# Patient Record
Sex: FEMALE | Race: WHITE | Employment: OTHER | URBAN - METROPOLITAN AREA
[De-identification: names, ages, dates, MRNs, and addresses within clinical notes are randomized per-mention and may not be internally consistent; named-entity substitution may affect disease eponyms.]

---

## 2017-03-16 RX ORDER — ENALAPRIL MALEATE 10 MG/1
TABLET ORAL
Qty: 90 TABLET | Refills: 0 | Status: SHIPPED | OUTPATIENT
Start: 2017-03-16 | End: 2017-06-13

## 2017-03-16 RX ORDER — METOPROLOL SUCCINATE 50 MG/1
TABLET, EXTENDED RELEASE ORAL
Qty: 90 TABLET | Refills: 0 | Status: SHIPPED | OUTPATIENT
Start: 2017-03-16 | End: 2017-06-13

## 2017-04-18 ENCOUNTER — OFFICE VISIT (OUTPATIENT)
Dept: INTERNAL MEDICINE CLINIC | Facility: CLINIC | Age: 67
End: 2017-04-18

## 2017-04-18 VITALS
DIASTOLIC BLOOD PRESSURE: 66 MMHG | HEART RATE: 63 BPM | TEMPERATURE: 98 F | BODY MASS INDEX: 28.32 KG/M2 | SYSTOLIC BLOOD PRESSURE: 134 MMHG | RESPIRATION RATE: 18 BRPM | WEIGHT: 170 LBS | HEIGHT: 65 IN

## 2017-04-18 DIAGNOSIS — I10 ESSENTIAL HYPERTENSION WITH GOAL BLOOD PRESSURE LESS THAN 130/80: ICD-10-CM

## 2017-04-18 DIAGNOSIS — I48.0 PAROXYSMAL ATRIAL FIBRILLATION (HCC): ICD-10-CM

## 2017-04-18 DIAGNOSIS — Z12.31 VISIT FOR SCREENING MAMMOGRAM: ICD-10-CM

## 2017-04-18 DIAGNOSIS — J02.9 PHARYNGITIS, UNSPECIFIED ETIOLOGY: Primary | ICD-10-CM

## 2017-04-18 DIAGNOSIS — Z91.89 AT HIGH RISK FOR BREAST CANCER: ICD-10-CM

## 2017-04-18 PROCEDURE — 99214 OFFICE O/P EST MOD 30 MIN: CPT | Performed by: INTERNAL MEDICINE

## 2017-04-18 PROCEDURE — G0463 HOSPITAL OUTPT CLINIC VISIT: HCPCS | Performed by: INTERNAL MEDICINE

## 2017-04-18 RX ORDER — AMOXICILLIN 875 MG/1
875 TABLET, COATED ORAL 2 TIMES DAILY
Qty: 20 TABLET | Refills: 0 | Status: SHIPPED | OUTPATIENT
Start: 2017-04-18 | End: 2018-03-13

## 2017-04-27 RX ORDER — ATORVASTATIN CALCIUM 10 MG/1
TABLET, FILM COATED ORAL
Qty: 30 TABLET | Refills: 0 | Status: SHIPPED | OUTPATIENT
Start: 2017-04-27 | End: 2017-05-28

## 2017-04-30 NOTE — PROGRESS NOTES
HPI:    Patient ID: Particia Pitcher is a 77year old female. Sore Throat   Associated symptoms include congestion. Pertinent negatives include no abdominal pain, coughing, diarrhea, ear discharge, headaches, shortness of breath or vomiting.      Patien Tab TAKE ONE TABLET BY MOUTH ONCE DAILY Disp: 90 tablet Rfl: 0   METOPROLOL SUCCINATE ER 50 MG Oral Tablet 24 Hr TAKE ONE TABLET BY MOUTH ONCE DAILY Disp: 90 tablet Rfl: 0   Levocetirizine Dihydrochloride (XYZAL) 5 MG Oral Tab Take 1 tablet (5 mg total) by Constitutional: She appears well-nourished. No distress. HENT:   Head: Normocephalic and atraumatic. Right Ear: External ear normal.   Left Ear: External ear normal.   Nose: Rhinorrhea present. No mucosal edema.  Right sinus exhibits no maxillary sinu stable. The visualized skin and subcutaneous tissues are unremarkable.     Left breast: The distribution of the parenchyma remains stable. There are  no new masses or suspicious clustered calcifications. The axillary regions  are stable.  The visualized ski

## 2017-05-28 RX ORDER — ATORVASTATIN CALCIUM 10 MG/1
TABLET, FILM COATED ORAL
Qty: 30 TABLET | Refills: 11 | Status: SHIPPED | OUTPATIENT
Start: 2017-05-28 | End: 2018-05-13

## 2017-06-13 RX ORDER — METOPROLOL SUCCINATE 50 MG/1
TABLET, EXTENDED RELEASE ORAL
Qty: 90 TABLET | Refills: 0 | Status: SHIPPED | OUTPATIENT
Start: 2017-06-13 | End: 2017-09-12

## 2017-06-13 RX ORDER — ENALAPRIL MALEATE 10 MG/1
TABLET ORAL
Qty: 90 TABLET | Refills: 0 | Status: SHIPPED | OUTPATIENT
Start: 2017-06-13 | End: 2017-07-31

## 2017-07-06 ENCOUNTER — HOSPITAL ENCOUNTER (OUTPATIENT)
Dept: MAMMOGRAPHY | Age: 67
Discharge: HOME | End: 2017-07-06
Attending: INTERNAL MEDICINE
Payer: MEDICARE

## 2017-07-06 DIAGNOSIS — Z12.31 VISIT FOR SCREENING MAMMOGRAM: ICD-10-CM

## 2017-07-06 PROCEDURE — 77067 SCR MAMMO BI INCL CAD: CPT | Performed by: INTERNAL MEDICINE

## 2017-07-18 ENCOUNTER — HOSPITAL ENCOUNTER (OUTPATIENT)
Dept: MAMMOGRAPHY | Facility: HOSPITAL | Age: 67
Discharge: HOME | End: 2017-07-18
Attending: INTERNAL MEDICINE
Payer: MEDICARE

## 2017-07-18 ENCOUNTER — HOSPITAL ENCOUNTER (OUTPATIENT)
Dept: ULTRASOUND IMAGING | Facility: HOSPITAL | Age: 67
Discharge: HOME | End: 2017-07-18
Attending: INTERNAL MEDICINE
Payer: MEDICARE

## 2017-07-18 DIAGNOSIS — R92.8 ABNORMAL MAMMOGRAM: ICD-10-CM

## 2017-07-18 PROCEDURE — 76642 ULTRASOUND BREAST LIMITED: CPT | Performed by: INTERNAL MEDICINE

## 2017-07-18 PROCEDURE — 77061 BREAST TOMOSYNTHESIS UNI: CPT | Performed by: INTERNAL MEDICINE

## 2017-07-18 PROCEDURE — 77065 DX MAMMO INCL CAD UNI: CPT | Performed by: INTERNAL MEDICINE

## 2017-07-31 PROBLEM — I27.20 PULMONARY HTN (HCC): Status: ACTIVE | Noted: 2017-07-31

## 2017-08-07 ENCOUNTER — LAB ENCOUNTER (OUTPATIENT)
Dept: LAB | Age: 67
End: 2017-08-07
Attending: INTERNAL MEDICINE
Payer: MEDICARE

## 2017-08-07 DIAGNOSIS — I08.0 MITRAL VALVE INSUFFICIENCY AND AORTIC VALVE INSUFFICIENCY: ICD-10-CM

## 2017-08-07 DIAGNOSIS — I48.0 PAROXYSMAL ATRIAL FIBRILLATION (HCC): ICD-10-CM

## 2017-08-07 DIAGNOSIS — I27.20 PULMONARY HTN (HCC): ICD-10-CM

## 2017-08-07 DIAGNOSIS — I10 HYPERTENSION, BENIGN: ICD-10-CM

## 2017-08-07 LAB
ANION GAP SERPL CALC-SCNC: 6 MMOL/L (ref 0–18)
BUN SERPL-MCNC: 16 MG/DL (ref 8–20)
BUN/CREAT SERPL: 17.8 (ref 10–20)
CALCIUM SERPL-MCNC: 9.3 MG/DL (ref 8.5–10.5)
CHLORIDE SERPL-SCNC: 110 MMOL/L (ref 95–110)
CO2 SERPL-SCNC: 25 MMOL/L (ref 22–32)
CREAT SERPL-MCNC: 0.9 MG/DL (ref 0.5–1.5)
GLUCOSE SERPL-MCNC: 91 MG/DL (ref 70–99)
OSMOLALITY UR CALC.SUM OF ELEC: 293 MOSM/KG (ref 275–295)
POTASSIUM SERPL-SCNC: 4 MMOL/L (ref 3.3–5.1)
SODIUM SERPL-SCNC: 141 MMOL/L (ref 136–144)

## 2017-08-07 PROCEDURE — 36415 COLL VENOUS BLD VENIPUNCTURE: CPT

## 2017-08-07 PROCEDURE — 80048 BASIC METABOLIC PNL TOTAL CA: CPT

## 2017-09-12 RX ORDER — METOPROLOL SUCCINATE 50 MG/1
TABLET, EXTENDED RELEASE ORAL
Qty: 90 TABLET | Refills: 3 | Status: SHIPPED | OUTPATIENT
Start: 2017-09-12 | End: 2018-07-31

## 2018-03-05 ENCOUNTER — TELEPHONE (OUTPATIENT)
Dept: INTERNAL MEDICINE CLINIC | Facility: CLINIC | Age: 68
End: 2018-03-05

## 2018-03-05 NOTE — TELEPHONE ENCOUNTER
•  ATORVASTATIN 10 MG Oral Tab, TAKE ONE TABLET BY MOUTH ONCE DAILY IN THE EVENING . APPOINTMENT  NEEDED  FOR  ADDITIONAL  REFILLS, Disp: 30 tablet, Rfl: 11

## 2018-03-13 ENCOUNTER — OFFICE VISIT (OUTPATIENT)
Dept: INTERNAL MEDICINE CLINIC | Facility: CLINIC | Age: 68
End: 2018-03-13

## 2018-03-13 ENCOUNTER — APPOINTMENT (OUTPATIENT)
Dept: LAB | Age: 68
End: 2018-03-13
Attending: INTERNAL MEDICINE
Payer: MEDICARE

## 2018-03-13 VITALS
SYSTOLIC BLOOD PRESSURE: 135 MMHG | HEART RATE: 64 BPM | DIASTOLIC BLOOD PRESSURE: 60 MMHG | TEMPERATURE: 98 F | BODY MASS INDEX: 27.82 KG/M2 | WEIGHT: 167 LBS | HEIGHT: 65 IN

## 2018-03-13 DIAGNOSIS — I27.20 PULMONARY HTN (HCC): ICD-10-CM

## 2018-03-13 DIAGNOSIS — Z12.31 VISIT FOR SCREENING MAMMOGRAM: ICD-10-CM

## 2018-03-13 DIAGNOSIS — I10 ESSENTIAL HYPERTENSION WITH GOAL BLOOD PRESSURE LESS THAN 130/80: ICD-10-CM

## 2018-03-13 DIAGNOSIS — E78.5 HYPERLIPIDEMIA, UNSPECIFIED HYPERLIPIDEMIA TYPE: ICD-10-CM

## 2018-03-13 DIAGNOSIS — Z78.0 POSTMENOPAUSAL: ICD-10-CM

## 2018-03-13 DIAGNOSIS — E55.9 VITAMIN D DEFICIENCY: ICD-10-CM

## 2018-03-13 DIAGNOSIS — Z00.00 ENCOUNTER FOR ANNUAL HEALTH EXAMINATION: ICD-10-CM

## 2018-03-13 DIAGNOSIS — Z91.89 AT HIGH RISK FOR BREAST CANCER: ICD-10-CM

## 2018-03-13 DIAGNOSIS — Z00.00 MEDICARE ANNUAL WELLNESS VISIT, SUBSEQUENT: Primary | ICD-10-CM

## 2018-03-13 DIAGNOSIS — I48.0 PAROXYSMAL ATRIAL FIBRILLATION (HCC): ICD-10-CM

## 2018-03-13 LAB
ALBUMIN SERPL BCP-MCNC: 3.9 G/DL (ref 3.5–4.8)
ALBUMIN/GLOB SERPL: 1.1 {RATIO} (ref 1–2)
ALP SERPL-CCNC: 62 U/L (ref 32–100)
ALT SERPL-CCNC: 21 U/L (ref 14–54)
ANION GAP SERPL CALC-SCNC: 7 MMOL/L (ref 0–18)
AST SERPL-CCNC: 24 U/L (ref 15–41)
BILIRUB SERPL-MCNC: 0.9 MG/DL (ref 0.3–1.2)
BUN SERPL-MCNC: 16 MG/DL (ref 8–20)
BUN/CREAT SERPL: 19.3 (ref 10–20)
CALCIUM SERPL-MCNC: 9.2 MG/DL (ref 8.5–10.5)
CHLORIDE SERPL-SCNC: 106 MMOL/L (ref 95–110)
CHOLEST SERPL-MCNC: 144 MG/DL (ref 110–200)
CO2 SERPL-SCNC: 26 MMOL/L (ref 22–32)
CREAT SERPL-MCNC: 0.83 MG/DL (ref 0.5–1.5)
ERYTHROCYTE [DISTWIDTH] IN BLOOD BY AUTOMATED COUNT: 13.3 % (ref 11–15)
GLOBULIN PLAS-MCNC: 3.4 G/DL (ref 2.5–3.7)
GLUCOSE SERPL-MCNC: 96 MG/DL (ref 70–99)
HCT VFR BLD AUTO: 38.8 % (ref 35–48)
HDLC SERPL-MCNC: 47 MG/DL
HGB BLD-MCNC: 13.1 G/DL (ref 12–16)
LDLC SERPL CALC-MCNC: 83 MG/DL (ref 0–99)
MCH RBC QN AUTO: 29.3 PG (ref 27–32)
MCHC RBC AUTO-ENTMCNC: 33.7 G/DL (ref 32–37)
MCV RBC AUTO: 86.9 FL (ref 80–100)
NONHDLC SERPL-MCNC: 97 MG/DL
OSMOLALITY UR CALC.SUM OF ELEC: 289 MOSM/KG (ref 275–295)
PATIENT FASTING: YES
PLATELET # BLD AUTO: 170 K/UL (ref 140–400)
PMV BLD AUTO: 9.7 FL (ref 7.4–10.3)
POTASSIUM SERPL-SCNC: 3.5 MMOL/L (ref 3.3–5.1)
PROT SERPL-MCNC: 7.3 G/DL (ref 5.9–8.4)
RBC # BLD AUTO: 4.47 M/UL (ref 3.7–5.4)
RBC #/AREA URNS AUTO: 1 /HPF
SODIUM SERPL-SCNC: 139 MMOL/L (ref 136–144)
T4 FREE SERPL-MCNC: 0.93 NG/DL (ref 0.58–1.64)
TRIGL SERPL-MCNC: 72 MG/DL (ref 1–149)
TSH SERPL-ACNC: 2.26 UIU/ML (ref 0.45–5.33)
WBC # BLD AUTO: 4.9 K/UL (ref 4–11)
WBC #/AREA URNS AUTO: <1 /HPF

## 2018-03-13 PROCEDURE — 84439 ASSAY OF FREE THYROXINE: CPT

## 2018-03-13 PROCEDURE — 36415 COLL VENOUS BLD VENIPUNCTURE: CPT

## 2018-03-13 PROCEDURE — 85027 COMPLETE CBC AUTOMATED: CPT

## 2018-03-13 PROCEDURE — 80061 LIPID PANEL: CPT

## 2018-03-13 PROCEDURE — G0439 PPPS, SUBSEQ VISIT: HCPCS | Performed by: INTERNAL MEDICINE

## 2018-03-13 PROCEDURE — 80053 COMPREHEN METABOLIC PANEL: CPT

## 2018-03-13 PROCEDURE — 84443 ASSAY THYROID STIM HORMONE: CPT

## 2018-03-13 PROCEDURE — 81015 MICROSCOPIC EXAM OF URINE: CPT

## 2018-03-13 NOTE — PATIENT INSTRUCTIONS
Elina Campos's SCREENING SCHEDULE   Tests on this list are recommended by your physician but may not be covered, or covered at this frequency, by your insurer. Please check with your insurance carrier before scheduling to verify coverage.    Lynell Fabry Age 76     Colonoscopy Screen   Covered every 10 years- more often if abnormal Colonoscopy,10 Years due on 02/28/2024 Update Health Maintenance if applicable    Flex Sigmoidoscopy Screen  Covered every 5 years No results found for this or any previous visi performed in visit on 04/22/16  -PNEUMOCOCCAL VACC, 13 AAKASH IM    Please get once after your 65th birthday    Pneumococcal 23 (Pneumovax)  Covered Once after 65 No orders found for this or any previous visit.  Please get once after your 65th birthday    Michela Sutton

## 2018-03-13 NOTE — PROGRESS NOTES
HPI:   Miguel Mccracken is a 79year old female who presents for a Medicare Subsequent Annual Wellness visit (Pt already had Initial Annual Wellness).       Her last annual assessment has been over 1 year: Annual Physical due on 04/22/2017         Fall/Ris fibrillation ([facility] [address]     Hypertension, benign     Hyperlipidemia     Urticaria     Pulmonary HTN ([facility] [address]    Wt Readings from Last 3 Encounters:  03/13/18 : 167 lb (75.8 kg)  07/31/17 : 168 lb (76.2 kg)  04/18/17 : 170 lb (77.1 kg)     Last Cholesterol Labs: She  has a past surgical history that includes electrocardiogram, complete (05-); smay needle localization w/ specimen 1 site left (1998); samy needle localization w/ specimen 1 site right (1998); and hysterectomy.     Her family history includes Maura hearing over the telephone:  No    I have trouble understanding things on the TV:  No I have to strain to understand conversations:  No   I have to worry about missing the telephone ring or doorbell:  No I have trouble hearing conversations in a noisy back hernia. Musculoskeletal: She exhibits no tenderness. Neurological: She is alert. Gait normal.   Skin: Skin is warm and dry. No lesion and no rash noted. She is not diaphoretic.    Psychiatric: Her behavior is normal. Thought content normal.        Vacci Low chol diet      (Z12.31) Visit for screening mammogram  Plan: [facility] SHARONA 2D+3D SCREENING BILAT         (CPT=77067/90219)    Ordered  Self breat exam advsied    (Z78.0) Postmenopausal  Plan: XR DEXA BONE DENSITOMETRY (CPT=77080)       No fractures      (E5 flowsheet data found. Fecal Occult Blood Annually No results found for: FOB No flowsheet data found. Glaucoma Screening      Ophthalmology Visit Annually: Diabetics, FHx Glaucoma, AA>50, > 65 No flowsheet data found.     Bone Density Screenin Procedure External Lab or Procedure      Annual Monitoring of Persistent     Medications (ACE/ARB, digoxin diuretics, anticonvulsants.)    Potassium  Annually Potassium (mmol/L)   Date Value   08/07/2017 4.0     POTASSIUM (P) (mmol/L)   Date Value   04/26/

## 2018-05-15 RX ORDER — ATORVASTATIN CALCIUM 10 MG/1
TABLET, FILM COATED ORAL
Qty: 30 TABLET | Refills: 2 | Status: SHIPPED | OUTPATIENT
Start: 2018-05-15 | End: 2018-08-05

## 2018-05-15 NOTE — TELEPHONE ENCOUNTER
Cholesterol Medications  Protocol Criteria:  · Appointment scheduled in the past 12 months or in the next 3 months  · ALT & LDL on file in the past 12 months  · ALT result < 80  · LDL result <130   Recent Outpatient Visits            2 months ago Medicare

## 2018-07-31 PROBLEM — R01.1 MURMUR: Status: ACTIVE | Noted: 2018-07-31

## 2018-08-06 RX ORDER — ATORVASTATIN CALCIUM 10 MG/1
TABLET, FILM COATED ORAL
Qty: 30 TABLET | Refills: 11 | Status: SHIPPED | OUTPATIENT
Start: 2018-08-06 | End: 2019-07-11

## 2018-08-16 ENCOUNTER — HOSPITAL ENCOUNTER (OUTPATIENT)
Dept: BONE DENSITY | Facility: HOSPITAL | Age: 68
Discharge: HOME | End: 2018-08-16
Attending: INTERNAL MEDICINE
Payer: MEDICARE

## 2018-08-16 ENCOUNTER — HOSPITAL ENCOUNTER (OUTPATIENT)
Dept: MAMMOGRAPHY | Facility: HOSPITAL | Age: 68
Discharge: HOME | End: 2018-08-16
Attending: INTERNAL MEDICINE
Payer: MEDICARE

## 2018-08-16 DIAGNOSIS — Z78.0 POSTMENOPAUSAL: ICD-10-CM

## 2018-08-16 DIAGNOSIS — Z12.31 VISIT FOR SCREENING MAMMOGRAM: ICD-10-CM

## 2018-08-16 PROCEDURE — 77067 SCR MAMMO BI INCL CAD: CPT | Performed by: INTERNAL MEDICINE

## 2018-08-16 PROCEDURE — 77080 DXA BONE DENSITY AXIAL: CPT | Performed by: INTERNAL MEDICINE

## 2018-08-16 PROCEDURE — 77063 BREAST TOMOSYNTHESIS BI: CPT | Performed by: INTERNAL MEDICINE

## 2018-08-25 ENCOUNTER — TELEPHONE (OUTPATIENT)
Dept: OTHER | Age: 68
End: 2018-08-25

## 2018-08-25 NOTE — TELEPHONE ENCOUNTER
Result Notes     Notes recorded by Augusto López RN on 8/24/2018 at 2:21 PM CDT  [place] transfer to triage  ------    Notes recorded by Chandan Miranda on 8/24/2018 at 7:45 AM CDT  Routed to triage.   ------    Notes recorded by Girish Wilder MD on 8

## 2018-09-06 RX ORDER — METOPROLOL SUCCINATE 50 MG/1
TABLET, EXTENDED RELEASE ORAL
Qty: 90 TABLET | Refills: 0 | Status: SHIPPED | OUTPATIENT
Start: 2018-09-06 | End: 2018-12-02

## 2018-09-06 NOTE — TELEPHONE ENCOUNTER
Hypertensive Medications  Protocol Criteria:  · Appointment scheduled in the past 6 months or in the next 3 months  · BMP or CMP in the past 12 months  · Creatinine result < 2  Recent Outpatient Visits            1 month ago Paroxysmal atrial fibrillation

## 2018-12-03 RX ORDER — METOPROLOL SUCCINATE 50 MG/1
TABLET, EXTENDED RELEASE ORAL
Qty: 90 TABLET | Refills: 3 | Status: SHIPPED | OUTPATIENT
Start: 2018-12-03 | End: 2019-11-19

## 2019-05-22 ENCOUNTER — TELEPHONE (OUTPATIENT)
Dept: INTERNAL MEDICINE CLINIC | Facility: CLINIC | Age: 69
End: 2019-05-22

## 2019-05-22 ENCOUNTER — APPOINTMENT (OUTPATIENT)
Dept: LAB | Age: 69
End: 2019-05-22
Attending: INTERNAL MEDICINE
Payer: MEDICARE

## 2019-05-22 ENCOUNTER — OFFICE VISIT (OUTPATIENT)
Dept: INTERNAL MEDICINE CLINIC | Facility: CLINIC | Age: 69
End: 2019-05-22
Payer: MEDICARE

## 2019-05-22 ENCOUNTER — HOSPITAL ENCOUNTER (OUTPATIENT)
Dept: GENERAL RADIOLOGY | Age: 69
Discharge: HOME | End: 2019-05-22
Attending: INTERNAL MEDICINE | Admitting: INTERNAL MEDICINE
Payer: MEDICARE

## 2019-05-22 VITALS
HEART RATE: 58 BPM | TEMPERATURE: 98 F | DIASTOLIC BLOOD PRESSURE: 76 MMHG | WEIGHT: 160 LBS | BODY MASS INDEX: 26.66 KG/M2 | HEIGHT: 65 IN | SYSTOLIC BLOOD PRESSURE: 162 MMHG

## 2019-05-22 DIAGNOSIS — E78.5 HYPERLIPIDEMIA, UNSPECIFIED HYPERLIPIDEMIA TYPE: ICD-10-CM

## 2019-05-22 DIAGNOSIS — Z12.31 VISIT FOR SCREENING MAMMOGRAM: ICD-10-CM

## 2019-05-22 DIAGNOSIS — I10 HYPERTENSION, BENIGN: ICD-10-CM

## 2019-05-22 DIAGNOSIS — Z00.00 ENCOUNTER FOR MEDICARE ANNUAL WELLNESS EXAM: Primary | ICD-10-CM

## 2019-05-22 DIAGNOSIS — I48.0 PAROXYSMAL ATRIAL FIBRILLATION (HCC): ICD-10-CM

## 2019-05-22 DIAGNOSIS — I27.20 PULMONARY HTN (HCC): ICD-10-CM

## 2019-05-22 PROCEDURE — 84439 ASSAY OF FREE THYROXINE: CPT

## 2019-05-22 PROCEDURE — G0009 ADMIN PNEUMOCOCCAL VACCINE: HCPCS | Performed by: INTERNAL MEDICINE

## 2019-05-22 PROCEDURE — 85027 COMPLETE CBC AUTOMATED: CPT

## 2019-05-22 PROCEDURE — 80053 COMPREHEN METABOLIC PANEL: CPT

## 2019-05-22 PROCEDURE — 84443 ASSAY THYROID STIM HORMONE: CPT

## 2019-05-22 PROCEDURE — 81015 MICROSCOPIC EXAM OF URINE: CPT

## 2019-05-22 PROCEDURE — 71046 X-RAY EXAM CHEST 2 VIEWS: CPT | Performed by: INTERNAL MEDICINE

## 2019-05-22 PROCEDURE — 80061 LIPID PANEL: CPT

## 2019-05-22 PROCEDURE — 36415 COLL VENOUS BLD VENIPUNCTURE: CPT

## 2019-05-22 PROCEDURE — G0463 HOSPITAL OUTPT CLINIC VISIT: HCPCS | Performed by: INTERNAL MEDICINE

## 2019-05-22 PROCEDURE — 99213 OFFICE O/P EST LOW 20 MIN: CPT | Performed by: INTERNAL MEDICINE

## 2019-05-22 PROCEDURE — G0439 PPPS, SUBSEQ VISIT: HCPCS | Performed by: INTERNAL MEDICINE

## 2019-05-22 PROCEDURE — 90732 PPSV23 VACC 2 YRS+ SUBQ/IM: CPT | Performed by: INTERNAL MEDICINE

## 2019-05-22 NOTE — TELEPHONE ENCOUNTER
Call received from [facility]alie [facility] to verify MMP's order for CXR for pt. Pt states she was seen today by PCP and was not told CXR was ordered. Informed Khris Brady was ordered for HTN.  Pt came for annual Medicare Wellness check up and the CXR is part of the

## 2019-05-22 NOTE — PROGRESS NOTES
HPI:   Whit Alonso is a 76year old female who presents for a Medicare Subsequent Annual Wellness visit (Pt already had Initial Annual Wellness).       Her last annual assessment has been over 1 year: Annual Physical due on 03/13/2019       Here for m performed Face to Face with patient and Family/surrogate (if present), and forms available to patient in AVS       She does NOT have a Power of  for [employer] on file in [place].    Advance care planning including the explanation and discussion of ad (20 mg total) by mouth daily with food.    ENALAPRIL MALEATE 10 MG Oral Tab TAKE ONE TABLET BY MOUTH TWICE DAILY   METOPROLOL SUCCINATE ER 50 MG Oral Tablet 24 Hr TAKE 1 TABLET BY MOUTH ONCE DAILY   ATORVASTATIN 10 MG Oral Tab TAKE 1 TABLET BY MOUTH ONCE DA shortness of breath and wheezing. Cardiovascular: Negative for chest pain, palpitations and leg swelling. Gastrointestinal: Negative for nausea, vomiting, abdominal pain, constipation and abdominal distention.    Genitourinary: Negative for dysuria, fr misunderstand what others are saying and make inappropriate responses:  No I avoid social activities because I cannot hear well and fear I will reply improperly:  No   Family members and friends have told me they think I may have hearing loss:   No SHARONA 2D+3D SCREENING BILAT         (CPT=77067/86568)        . Breast exam.  Bilateral fibrocystic  No discrete palpable masses or tenderness  No nipple discharge  And no axillary adenopathy.   Self breast exam advised      Medications and most recent test re Colonoscopy due on 02/28/2024 Update [facility] if applicable    Flex Sigmoidoscopy Screen every 10 years No results found for this or any previous visit. No flowsheet data found.      Fecal Occult Blood Annually No results found for: FOB No flowshe Part B No vaccine history found This may be covered with your pharmacy  prescription benefits      SPECIFIC DISEASE MONITORING Internal Lab or Procedure External Lab or Procedure      Annual Monitoring of Persistent     Medications (ACE/ARB, digoxin diuret

## 2019-05-24 NOTE — PROGRESS NOTES
HPI:    Patient ID: Tacho Cullen is a 76year old female.     HPI    BP (!) 162/76 (BP Location: Left arm, Patient Position: Sitting, Cuff Size: adult)   Pulse 58   Temp 97.9 °F (36.6 °C) (Oral)   Ht 5' 5\" (1.651 m)   Wt 160 lb (72.6 kg)   BMI 26.63 k Murmur 7/31/2018   • Unspecified essential hypertension    • Vertigo       Past Surgical History:   Procedure Laterality Date   • ELECTROCARDIOGRAM, COMPLETE  05-    SCANNED TO MEDIA TAB: 05-   • HYSTERECTOMY      age 28   • MAM NEEDLE Cordell Fothergill vitals reviewed.            ASSESSMENT/PLAN:   Encounter for medicare annual wellness exam  (primary encounter diagnosis)  Hypertension, benign  Paroxysmal atrial fibrillation (hcc)  Pulmonary htn (hcc)  Hyperlipidemia, unspecified hyperlipidemia type  Visi

## 2019-06-21 ENCOUNTER — HOSPITAL ENCOUNTER (OUTPATIENT)
Dept: GENERAL RADIOLOGY | Age: 69
Discharge: HOME | End: 2019-06-21
Attending: INTERNAL MEDICINE
Payer: MEDICARE

## 2019-06-21 DIAGNOSIS — J90 PLEURAL EFFUSION: ICD-10-CM

## 2019-06-21 PROCEDURE — 71046 X-RAY EXAM CHEST 2 VIEWS: CPT | Performed by: INTERNAL MEDICINE

## 2019-07-11 NOTE — TELEPHONE ENCOUNTER
Current Outpatient Medications:   •  ATORVASTATIN 10 MG Oral Tab, TAKE 1 TABLET BY MOUTH ONCE DAILY IN THE EVENING, Disp: 30 tablet, Rfl: 11

## 2019-07-12 RX ORDER — ATORVASTATIN CALCIUM 10 MG/1
TABLET, FILM COATED ORAL
Qty: 90 TABLET | Refills: 1 | Status: SHIPPED | OUTPATIENT
Start: 2019-07-12 | End: 2020-01-04

## 2019-07-12 NOTE — TELEPHONE ENCOUNTER
Refill passed per [facility] protocol.   Cholesterol Medications  Protocol Criteria:  · Appointment scheduled in the past 12 months or in the next 3 months  · ALT & LDL on file in the past 12 months  · ALT result < 80  · LDL result <130   Recent Outpat

## 2019-08-19 ENCOUNTER — HOSPITAL ENCOUNTER (OUTPATIENT)
Dept: MAMMOGRAPHY | Facility: HOSPITAL | Age: 69
Discharge: HOME | End: 2019-08-19
Attending: INTERNAL MEDICINE
Payer: MEDICARE

## 2019-08-19 DIAGNOSIS — Z12.31 VISIT FOR SCREENING MAMMOGRAM: ICD-10-CM

## 2019-08-19 PROCEDURE — 77067 SCR MAMMO BI INCL CAD: CPT | Performed by: INTERNAL MEDICINE

## 2019-08-19 PROCEDURE — 77063 BREAST TOMOSYNTHESIS BI: CPT | Performed by: INTERNAL MEDICINE

## 2019-09-30 PROBLEM — I05.1 RHEUMATIC MITRAL REGURGITATION: Status: ACTIVE | Noted: 2019-09-30

## 2019-11-19 RX ORDER — METOPROLOL SUCCINATE 50 MG/1
50 TABLET, EXTENDED RELEASE ORAL
Qty: 90 TABLET | Refills: 0 | Status: SHIPPED | OUTPATIENT
Start: 2019-11-19 | End: 2020-02-12

## 2019-11-25 ENCOUNTER — OFFICE VISIT (OUTPATIENT)
Dept: INTERNAL MEDICINE CLINIC | Facility: CLINIC | Age: 69
End: 2019-11-25
Payer: MEDICARE

## 2019-11-25 VITALS
WEIGHT: 159 LBS | DIASTOLIC BLOOD PRESSURE: 77 MMHG | HEART RATE: 61 BPM | BODY MASS INDEX: 26.49 KG/M2 | SYSTOLIC BLOOD PRESSURE: 166 MMHG | HEIGHT: 65 IN

## 2019-11-25 DIAGNOSIS — I10 HYPERTENSION, BENIGN: Primary | ICD-10-CM

## 2019-11-25 DIAGNOSIS — M25.561 ACUTE PAIN OF RIGHT KNEE: ICD-10-CM

## 2019-11-25 PROCEDURE — 99213 OFFICE O/P EST LOW 20 MIN: CPT | Performed by: INTERNAL MEDICINE

## 2019-11-25 PROCEDURE — G0463 HOSPITAL OUTPT CLINIC VISIT: HCPCS | Performed by: INTERNAL MEDICINE

## 2019-11-25 NOTE — PROGRESS NOTES
Catalina Kinney is a 76year old female.   Patient presents with:  Pain: Pt states right knee pain past month, stiffness, soreness, tenderness, aching      HPI:   She comes in urgent visit  C/C right knee pain  C/o right knee pain x1 month  No falls traum wire 1 site left (cpt=19281)  1998   • Dasha localization wire 1 site right (cpt=19281)  1998      Social History:  Social History    Tobacco Use      Smoking status: Former Smoker        Years: 15.00      Smokeless tobacco: Former User        Quit date: 7/2 Karena Ritchie etc.    The patient indicates understanding of these issues and agrees to the plan. No follow-ups on file.

## 2019-11-26 ENCOUNTER — HOSPITAL ENCOUNTER (OUTPATIENT)
Dept: GENERAL RADIOLOGY | Age: 69
Discharge: HOME | End: 2019-11-26
Attending: INTERNAL MEDICINE
Payer: MEDICARE

## 2019-11-26 DIAGNOSIS — M25.561 ACUTE PAIN OF RIGHT KNEE: ICD-10-CM

## 2019-11-26 PROCEDURE — 73564 X-RAY EXAM KNEE 4 OR MORE: CPT | Performed by: INTERNAL MEDICINE

## 2020-01-04 RX ORDER — ATORVASTATIN CALCIUM 10 MG/1
TABLET, FILM COATED ORAL
Qty: 90 TABLET | Refills: 1 | Status: SHIPPED | OUTPATIENT
Start: 2020-01-04 | End: 2020-06-26

## 2020-01-06 ENCOUNTER — OFFICE VISIT (OUTPATIENT)
Dept: PHYSICAL THERAPY | Facility: HOSPITAL | Age: 70
End: 2020-01-06
Attending: INTERNAL MEDICINE
Payer: MEDICARE

## 2020-01-06 DIAGNOSIS — M25.561 ACUTE PAIN OF RIGHT KNEE: ICD-10-CM

## 2020-01-06 PROCEDURE — 97110 THERAPEUTIC EXERCISES: CPT

## 2020-01-06 PROCEDURE — 97162 PT EVAL MOD COMPLEX 30 MIN: CPT

## 2020-01-06 NOTE — PROGRESS NOTES
KNEE EVALUATION:   Referring Physician: Dr. Pattie Boyle  Diagnosis: Acute pain of right knee (M25.561)    Date of Service: 1/6/2020     PATIENT SUMMARY   Michelle Black is a 71year old y/o female who presents to therapy today with complaints of R sided Acute pain of right knee (M25.561)    who presents with impairments in muscle length, motor control, gait, strength.  Rito Coppola demonstrates signs and symptoms consistent with likely patellofemoral pain syndrome due to her hip weakness and poor vince Motor  Control Comments    Left Right    Concentric step-up Decreased Decreased Narrow FABIANO   Eccentric step-down Decreased Decreased Narrow FABIANO     Activity Motor Control Comments    Left Right    Double Leg Squat Decreased Decreased Anterior tibial progre 45 mins to return to daily exercise for the health benefits of exercise. Mel[address] will report 2/10 pain or less with ascending/descending stairs to improve household ADL performance.     Frequency / Duration: Patient will be seen for 2 x/week o

## 2020-01-08 ENCOUNTER — OFFICE VISIT (OUTPATIENT)
Dept: PHYSICAL THERAPY | Facility: HOSPITAL | Age: 70
End: 2020-01-08
Attending: INTERNAL MEDICINE
Payer: MEDICARE

## 2020-01-08 PROCEDURE — 97110 THERAPEUTIC EXERCISES: CPT

## 2020-01-08 PROCEDURE — 97140 MANUAL THERAPY 1/> REGIONS: CPT

## 2020-01-08 NOTE — PROGRESS NOTES
Dx:  Acute pain of right knee (M25.561)           Authorized # of Visits:  2/10         Next MD visit: Dr. Christian Palomino  None scheduled  Fall Risk: Standard      Precautions: N/A           Subjective:  Pt reports compliance with HEP  Objective:   See below    D

## 2020-01-13 ENCOUNTER — OFFICE VISIT (OUTPATIENT)
Dept: PHYSICAL THERAPY | Facility: HOSPITAL | Age: 70
End: 2020-01-13
Attending: INTERNAL MEDICINE
Payer: MEDICARE

## 2020-01-13 PROCEDURE — 97140 MANUAL THERAPY 1/> REGIONS: CPT

## 2020-01-13 PROCEDURE — 97110 THERAPEUTIC EXERCISES: CPT

## 2020-01-13 NOTE — PROGRESS NOTES
Dx:  Acute pain of right knee (M25.561)             Authorized # of Visits:  10 (MEDICARE PART A&B)          Next MD visit: Dr. Teresa Hunter  None scheduled  Fall Risk: Standard        Precautions: N/A           Subjective: \"I feel some pain and ache yesterday and improving glut max/med activation/motor control    Charges: Grace 2 Man 1   Total Timed Treatment: 45 min   Total Treatment Time: 45 min

## 2020-01-15 ENCOUNTER — OFFICE VISIT (OUTPATIENT)
Dept: PHYSICAL THERAPY | Facility: HOSPITAL | Age: 70
End: 2020-01-15
Attending: INTERNAL MEDICINE
Payer: MEDICARE

## 2020-01-15 PROCEDURE — 97110 THERAPEUTIC EXERCISES: CPT

## 2020-01-15 NOTE — PROGRESS NOTES
Dx:  Acute pain of right knee (M25.561)             Authorized # of Visits:  10 (MEDICARE PART A&B)          Next MD visit: Dr. Kayleigh Gupta  None scheduled  Fall Risk: Standard        Precautions: N/A           Subjective: \"After last session I feel no pain r

## 2020-01-20 ENCOUNTER — OFFICE VISIT (OUTPATIENT)
Dept: PHYSICAL THERAPY | Facility: HOSPITAL | Age: 70
End: 2020-01-20
Attending: INTERNAL MEDICINE
Payer: MEDICARE

## 2020-01-20 PROCEDURE — 97110 THERAPEUTIC EXERCISES: CPT

## 2020-01-20 NOTE — PROGRESS NOTES
Dx:  Acute pain of right knee (M25.561)             Authorized # of Visits:  10 (MEDICARE PART A&B)          Next MD visit: Dr. Sehrif Bronson  None scheduled  Fall Risk: Standard        Precautions: N/A           Subjective: \"I feel ok today, on/off I feel sore of exercise. Melum [address] will report 2/10 pain or less with ascending/descending stairs to improve household ADL performance. Plan: Continue with POC with focus on LE strengthening and global hip strengthening to improved functional mobility.

## 2020-01-22 ENCOUNTER — OFFICE VISIT (OUTPATIENT)
Dept: PHYSICAL THERAPY | Facility: HOSPITAL | Age: 70
End: 2020-01-22
Attending: INTERNAL MEDICINE
Payer: MEDICARE

## 2020-01-22 PROCEDURE — 97112 NEUROMUSCULAR REEDUCATION: CPT

## 2020-01-22 PROCEDURE — 97110 THERAPEUTIC EXERCISES: CPT

## 2020-01-22 NOTE — PROGRESS NOTES
Dx:  Acute pain of right knee (M25.561)             Authorized # of Visits:  10 (MEDICARE PART A&B)          Next MD visit: Dr. Mahsa Barnes  None scheduled  Fall Risk: Standard        Precautions: N/A           Subjective: Pt reported she is pleased with overa requires high volume of verbal/tactile cues to improve activation. Pt educated at length on importance of focusing on quality of exercise performance vs speed/quantity of activity performance.  Pt stated understanding of importance of quality vs quantity to

## 2020-01-27 ENCOUNTER — OFFICE VISIT (OUTPATIENT)
Dept: PHYSICAL THERAPY | Facility: HOSPITAL | Age: 70
End: 2020-01-27
Attending: INTERNAL MEDICINE
Payer: MEDICARE

## 2020-01-27 PROCEDURE — 97110 THERAPEUTIC EXERCISES: CPT

## 2020-01-27 NOTE — PROGRESS NOTES
Dx:  Acute pain of right knee (M25.561)             Authorized # of Visits:  10 (MEDICARE PART A&B)          Next MD visit: Dr. Azael Dumas  None scheduled  Fall Risk: Standard        Precautions: N/A           Subjective: \"I am ok, getting better than I star sec  Re-education of gait patterning with focus on shorter step and decreased narrow stance     HEP:  Review and explain if you go to gym than used leg press, hip abd and adductor machine and walk on TM 15 min with 2.5 mph      HEP:   HEP:        Assessmen

## 2020-01-29 ENCOUNTER — OFFICE VISIT (OUTPATIENT)
Dept: PHYSICAL THERAPY | Facility: HOSPITAL | Age: 70
End: 2020-01-29
Attending: INTERNAL MEDICINE
Payer: MEDICARE

## 2020-01-29 PROCEDURE — 97110 THERAPEUTIC EXERCISES: CPT

## 2020-01-29 NOTE — PROGRESS NOTES
Dx:  Acute pain of right knee (M25.561)             Authorized # of Visits:  10 (MEDICARE PART A&B)          Next MD visit: Dr. Azael Dumas  None scheduled  Fall Risk: Standard        Precautions: N/A           Subjective: \"I am ok, getting better than I star to demonstrate return to maximum functional performance. 2. Jorden Jin will report she is able to walk for 45 mins to return to daily exercise for the health benefits of exercise.-Partially MET  3.  Jorden Jin will report 2/10 pain or less w

## 2020-02-03 ENCOUNTER — OFFICE VISIT (OUTPATIENT)
Dept: PHYSICAL THERAPY | Facility: HOSPITAL | Age: 70
End: 2020-02-03
Attending: INTERNAL MEDICINE
Payer: MEDICARE

## 2020-02-03 PROCEDURE — 97110 THERAPEUTIC EXERCISES: CPT

## 2020-02-03 PROCEDURE — 97112 NEUROMUSCULAR REEDUCATION: CPT

## 2020-02-03 NOTE — PROGRESS NOTES
Dx:  Acute pain of right knee (M25.561)             Authorized # of Visits:  10 (MEDICARE PART A&B)          Next MD visit: Dr. Mahesh Milner  None scheduled  Fall Risk: Standard        Precautions: N/A           Subjective:   Pt reports her knee pain has been g LAQ with hip ER for vastus medialis activation 2x10 x5 sec    HEP:           Assessment: Rito Coppola continues to demonstrate poor glut max activation across all activities. Requires high levels of verbal cues to improve performance.  Pt will be re-as

## 2020-02-05 ENCOUNTER — APPOINTMENT (OUTPATIENT)
Dept: PHYSICAL THERAPY | Facility: HOSPITAL | Age: 70
End: 2020-02-05
Attending: INTERNAL MEDICINE
Payer: MEDICARE

## 2020-02-10 ENCOUNTER — OFFICE VISIT (OUTPATIENT)
Dept: PHYSICAL THERAPY | Facility: HOSPITAL | Age: 70
End: 2020-02-10
Attending: INTERNAL MEDICINE
Payer: MEDICARE

## 2020-02-10 PROCEDURE — 97110 THERAPEUTIC EXERCISES: CPT

## 2020-02-10 NOTE — PROGRESS NOTES
KNEE RE-ASSESSMENT:   Referring Physician: Dr. Carmen Gomez   Diagnosis: Acute pain of right knee (M25.561)    Date of Service: 2/10/2020      PATIENT SUMMARY   Jorden Jin is a 71year old y/o female who reports 80% overall improvement in her symptom • Chronic atrial fibrillation    • High blood pressure    • High cholesterol    • Hx of headache    • Irregular heart rate    • Murmur 7/31/2018   • Murmur 7/31/2018   • Unspecified essential hypertension    • Vertigo      Past Surgical History:   Procedur Increased R ER, narrow FABIANO (decreased 50%), forward flexed posture (resolved), increased lateral sway R trunk lean (improved 75%), decreased glut max activation (decreased 50%), trandelenburg noted (decreased 50), decreased push off (B), decreased tibial p [address] will demonstrate score of 63/100 on FOTO to demonstrate return to maximum functional performance. - Met  2. Adela Cardenas will report she is able to walk for 45 mins to return to daily exercise for the health benefits of exercise.

## 2020-02-12 RX ORDER — METOPROLOL SUCCINATE 50 MG/1
TABLET, EXTENDED RELEASE ORAL
Qty: 90 TABLET | Refills: 0 | Status: SHIPPED | OUTPATIENT
Start: 2020-02-12 | End: 2020-05-06

## 2020-02-12 NOTE — TELEPHONE ENCOUNTER
Hypertensive Medications  LOV 11/25/19  Refill passed per [facility] protocol.     Protocol Criteria:  · Appointment scheduled in the past 6 months or in the next 3 months  · BMP or CMP in the past 12 months  · Creatinine result < 2  Recent Outpatient

## 2020-05-06 RX ORDER — METOPROLOL SUCCINATE 50 MG/1
TABLET, EXTENDED RELEASE ORAL
Qty: 90 TABLET | Refills: 0 | Status: SHIPPED | OUTPATIENT
Start: 2020-05-06 | End: 2020-06-29

## 2020-05-06 NOTE — TELEPHONE ENCOUNTER
PLEASE INFORM PATIENT, OFFICE VISIT REQUIRED PRIOR TO ANY ADDITIONAL REFILLS    Last OV  A year ago  sched telephone of video visit

## 2020-05-12 ENCOUNTER — VIRTUAL PHONE E/M (OUTPATIENT)
Dept: INTERNAL MEDICINE CLINIC | Facility: CLINIC | Age: 70
End: 2020-05-12
Payer: MEDICARE

## 2020-05-12 DIAGNOSIS — Z12.31 VISIT FOR SCREENING MAMMOGRAM: ICD-10-CM

## 2020-05-12 DIAGNOSIS — I27.20 PULMONARY HTN (HCC): ICD-10-CM

## 2020-05-12 DIAGNOSIS — I10 HYPERTENSION, BENIGN: Primary | ICD-10-CM

## 2020-05-12 DIAGNOSIS — I48.0 PAROXYSMAL ATRIAL FIBRILLATION (HCC): ICD-10-CM

## 2020-05-12 DIAGNOSIS — E78.5 HYPERLIPIDEMIA, UNSPECIFIED HYPERLIPIDEMIA TYPE: ICD-10-CM

## 2020-05-12 PROCEDURE — 99443 PHONE E/M BY PHYS 21-30 MIN: CPT | Performed by: INTERNAL MEDICINE

## 2020-05-12 NOTE — PROGRESS NOTES
HPI:    Patient ID: Willie Walls is a 71year old female. HPI Virtual Telephone Check-In    Willie Walls verbally consents to a Virtual/Telephone Check-In visit on 05/12/20.     Patient understands and accepts financial responsibility for any d dysuria, frequency, hematuria and urgency. Musculoskeletal: Positive for arthralgias (knees hurt  occas pain  Xray osteoarthritis and PT). Negative for back pain, gait problem and joint swelling. Skin: Positive for rash (?bug bites  or allergy  ).  Knute Pro • LEE LOCALIZATION WIRE 1 SITE RIGHT (CPT=19281)  1998      Family History   Problem Relation Age of Onset   • Pulmonary Disease Mother         COPD; Cause of death   • Cancer Father         Liver cancer   • Diabetes Maternal Grandmother    • Breast Canc provider-patient relationship, due to the ongoing public health crisis/national emergency and because of restrictions of visitation. There are limitations of this visit as no physical exam could be performed.   Every conscious effort was taken to allow for

## 2020-05-29 ENCOUNTER — APPOINTMENT (OUTPATIENT)
Dept: LAB | Age: 70
End: 2020-05-29
Attending: INTERNAL MEDICINE
Payer: MEDICARE

## 2020-05-29 DIAGNOSIS — E78.5 HYPERLIPIDEMIA, UNSPECIFIED HYPERLIPIDEMIA TYPE: ICD-10-CM

## 2020-05-29 DIAGNOSIS — I10 HYPERTENSION, BENIGN: ICD-10-CM

## 2020-05-29 PROCEDURE — 84443 ASSAY THYROID STIM HORMONE: CPT

## 2020-05-29 PROCEDURE — 36415 COLL VENOUS BLD VENIPUNCTURE: CPT

## 2020-05-29 PROCEDURE — 80061 LIPID PANEL: CPT

## 2020-05-29 PROCEDURE — 80053 COMPREHEN METABOLIC PANEL: CPT

## 2020-05-29 PROCEDURE — 81015 MICROSCOPIC EXAM OF URINE: CPT

## 2020-05-29 PROCEDURE — 84439 ASSAY OF FREE THYROXINE: CPT

## 2020-05-29 PROCEDURE — 85027 COMPLETE CBC AUTOMATED: CPT

## 2020-06-08 ENCOUNTER — VIRTUAL PHONE E/M (OUTPATIENT)
Dept: INTERNAL MEDICINE CLINIC | Facility: CLINIC | Age: 70
End: 2020-06-08
Payer: MEDICARE

## 2020-06-08 VITALS — DIASTOLIC BLOOD PRESSURE: 65 MMHG | SYSTOLIC BLOOD PRESSURE: 133 MMHG | HEART RATE: 58 BPM

## 2020-06-08 DIAGNOSIS — I10 HYPERTENSION, BENIGN: Primary | ICD-10-CM

## 2020-06-08 DIAGNOSIS — D64.9 ANEMIA, UNSPECIFIED TYPE: ICD-10-CM

## 2020-06-08 DIAGNOSIS — I48.0 PAROXYSMAL ATRIAL FIBRILLATION (HCC): ICD-10-CM

## 2020-06-08 DIAGNOSIS — I27.20 PULMONARY HTN (HCC): ICD-10-CM

## 2020-06-08 DIAGNOSIS — I05.1 RHEUMATIC MITRAL REGURGITATION: ICD-10-CM

## 2020-06-08 PROCEDURE — 99443 PHONE E/M BY PHYS 21-30 MIN: CPT | Performed by: INTERNAL MEDICINE

## 2020-06-08 NOTE — PROGRESS NOTES
Virtual Telephone Check-In    Molina Lozoya verbally consents to a Virtual/Telephone Check-In visit on 06/08/20. Patient has been referred to the [facility] website at [email or web address] to review the yearly Consent to Treat document.     Patient under dysuria, frequency, hematuria and urgency. Musculoskeletal: Negative for back pain, gait problem and joint swelling. Skin: Negative for rash. Neurological: Negative for syncope, weakness, light-headedness and headaches.    Hematological: Negative for death   • Cancer Father         Liver cancer   • Diabetes Maternal Grandmother    • Breast Cancer Sister 39      Social History: Social History    Tobacco Use      Smoking status: Former Smoker        Years: 15.00      Smokeless tobacco: Former User 15   NON HDL CHOL      <130 mg/dL  79   SQUAM EPI CELLS UR      /HPF  Few   WBC Urine      0 - 5 /HPF  1   RBC URINE      0 - 2 /HPF  <1   Bacteria Urine      None Seen /HPF  Few (A)   TSH      0.358 - 3.740 mIU/mL  2.210   T4,Free (Direct)      0.8 - 1.7

## 2020-06-23 ENCOUNTER — LAB ENCOUNTER (OUTPATIENT)
Dept: LAB | Age: 70
End: 2020-06-23
Attending: INTERNAL MEDICINE
Payer: MEDICARE

## 2020-06-23 ENCOUNTER — HOSPITAL ENCOUNTER (OUTPATIENT)
Dept: GENERAL RADIOLOGY | Age: 70
Discharge: HOME | End: 2020-06-23
Attending: INTERNAL MEDICINE
Payer: MEDICARE

## 2020-06-23 DIAGNOSIS — D64.9 ANEMIA, UNSPECIFIED TYPE: ICD-10-CM

## 2020-06-23 DIAGNOSIS — I27.20 PULMONARY HTN (HCC): ICD-10-CM

## 2020-06-23 PROCEDURE — 83540 ASSAY OF IRON: CPT

## 2020-06-23 PROCEDURE — 71046 X-RAY EXAM CHEST 2 VIEWS: CPT | Performed by: INTERNAL MEDICINE

## 2020-06-23 PROCEDURE — 82607 VITAMIN B-12: CPT

## 2020-06-23 PROCEDURE — 85025 COMPLETE CBC W/AUTO DIFF WBC: CPT

## 2020-06-23 PROCEDURE — 82746 ASSAY OF FOLIC ACID SERUM: CPT

## 2020-06-23 PROCEDURE — 36415 COLL VENOUS BLD VENIPUNCTURE: CPT

## 2020-06-23 PROCEDURE — 84466 ASSAY OF TRANSFERRIN: CPT

## 2020-06-25 ENCOUNTER — TELEPHONE (OUTPATIENT)
Dept: INTERNAL MEDICINE CLINIC | Facility: CLINIC | Age: 70
End: 2020-06-25

## 2020-06-25 NOTE — TELEPHONE ENCOUNTER
90 day refill        Current Outpatient Medications:   •  ATORVASTATIN 10 MG Oral Tab, TAKE 1 TABLET BY MOUTH ONCE DAILY IN THE EVENING, Disp: 90 tablet, Rfl: 1

## 2020-07-29 ENCOUNTER — TELEPHONE (OUTPATIENT)
Dept: GASTROENTEROLOGY | Facility: CLINIC | Age: 70
End: 2020-07-29

## 2020-07-29 ENCOUNTER — OFFICE VISIT (OUTPATIENT)
Dept: GASTROENTEROLOGY | Facility: CLINIC | Age: 70
End: 2020-07-29
Payer: MEDICARE

## 2020-07-29 VITALS
SYSTOLIC BLOOD PRESSURE: 156 MMHG | DIASTOLIC BLOOD PRESSURE: 80 MMHG | WEIGHT: 151 LBS | BODY MASS INDEX: 25.16 KG/M2 | HEIGHT: 65 IN

## 2020-07-29 DIAGNOSIS — D50.8 OTHER IRON DEFICIENCY ANEMIA: Primary | ICD-10-CM

## 2020-07-29 DIAGNOSIS — Z79.01 CHRONIC ANTICOAGULATION: ICD-10-CM

## 2020-07-29 PROCEDURE — 99204 OFFICE O/P NEW MOD 45 MIN: CPT | Performed by: INTERNAL MEDICINE

## 2020-07-29 NOTE — TELEPHONE ENCOUNTER
Dr. Ivan Nash and Cardio RN's:    Patient is scheduled for EGD/colonoscopy on 8-. GI is deferring to prescribing physician to modify orders for Xarelto. Please advise on specifics including number of days patient should hold prior to procedure.     Silvano Avila

## 2020-07-29 NOTE — TELEPHONE ENCOUNTER
Scheduled for:  Colonoscopy [address] / EGD [address]  Provider Name:  Dr. Bronwyn Bedolla  Date:  8/11/2020  Location:  [facility]  Sedation:  MAC  Time:  10:00 am, (pt is aware that [facility] will call the day before to confirm arrival time)  Prep:  Miralax/Gatorade  Meds/Allergies R

## 2020-07-29 NOTE — PATIENT INSTRUCTIONS
1. Schedule EGD/colonoscopy with MAC sedation [Diagnosis: iron deficiency anemia]    2.  bowel prep from pharmacy (Hakia prep)    3.  Continue all medications for procedure except  -Anti-platelets and anti-coagulants: Xarelto per cardiology    ** I

## 2020-07-29 NOTE — TELEPHONE ENCOUNTER
GI RN's    Patient is scheduled for colonoscopy/EGD on 8/11/2020 and is taking Xarelto. Please contact Dr. Moshe Edmond office for orders to hold. Thank you.

## 2020-07-29 NOTE — TELEPHONE ENCOUNTER
Lawrence Degroot MD  You; Ellwood Gilford, RN 9 minutes ago (3:34 PM)      Ok to stop xarelto for three days before endoscopy

## 2020-07-29 NOTE — TELEPHONE ENCOUNTER
Not a patient of [facility],   [address] clinic.  See Dr. Hanh Nassar with [facility].

## 2020-07-29 NOTE — H&P
[facility] - Gastroenterology  Clinic History and Physical     Patient presents with:  Anemia: did have colonoscopy about 6 years ago; no prior EGD      HPI:   Molina Lozoya is a 71year old year-old female patient of Dr. Kylee Servin with a history (CPT=19281)  1998   • LEE LOCALIZATION WIRE 1 SITE RIGHT (CPT=19281)  1998      Family Hx:   Family History   Problem Relation Age of Onset   • Pulmonary Disease Mother         COPD; Cause of death   • Cancer Father         Liver cancer   • Diabetes Matern heat intolerance  MUSCULOSKELETAL:  negative for joint effusion/severe erythema  BEHAVIOR/PSYCH:  negative for psychotic behavior      PHYSICAL EXAM:   /80 (BP Location: Right arm, Patient Position: Sitting, Cuff Size: adult)   Ht 5' 5\" (1.651 m) 3.80 - 5.30 x10(6)uL 4.20 4.06   Hemoglobin      12.0 - 16.0 g/dL 11.7 (L) 11.3 (L)   Hematocrit      35.0 - 48.0 % 36.1 34.9 (L)   MCV      80.0 - 100.0 fL 86.0 86.0   MCH      26.0 - 34.0 pg 27.9 27.8   MCHC      31.0 - 37.0 g/dL 32.4 32.4   RDW-SD I have discussed the risks, benefits, and alternatives to colonoscopy and EGD with the patient [who demonstrated understanding], including but not limited to the risks of bleeding, infection, pain, death, as well as the risks of anesthesia and perforation

## 2020-08-08 ENCOUNTER — LAB REQUISITION (OUTPATIENT)
Dept: LAB | Facility: HOSPITAL | Age: 70
End: 2020-08-08
Payer: MEDICARE

## 2020-08-08 DIAGNOSIS — Z20.828 CONTACT WITH AND (SUSPECTED) EXPOSURE TO OTHER VIRAL COMMUNICABLE DISEASES: ICD-10-CM

## 2020-08-09 LAB — SARS-COV-2 RNA RESP QL NAA+PROBE: NOT DETECTED

## 2020-08-11 ENCOUNTER — LAB REQUISITION (OUTPATIENT)
Dept: LAB | Facility: HOSPITAL | Age: 70
End: 2020-08-11
Payer: MEDICARE

## 2020-08-11 ENCOUNTER — SURGERY CENTER DOCUMENTATION (OUTPATIENT)
Dept: SURGERY | Age: 70
End: 2020-08-11

## 2020-08-11 DIAGNOSIS — K25.9 GASTRIC ULCER, UNSPECIFIED AS ACUTE OR CHRONIC, WITHOUT HEMORRHAGE OR PERFORATION: ICD-10-CM

## 2020-08-11 PROCEDURE — 88312 SPECIAL STAINS GROUP 1: CPT | Performed by: INTERNAL MEDICINE

## 2020-08-11 PROCEDURE — 45378 DIAGNOSTIC COLONOSCOPY: CPT | Performed by: INTERNAL MEDICINE

## 2020-08-11 PROCEDURE — 43239 EGD BIOPSY SINGLE/MULTIPLE: CPT | Performed by: INTERNAL MEDICINE

## 2020-08-11 PROCEDURE — 88305 TISSUE EXAM BY PATHOLOGIST: CPT | Performed by: INTERNAL MEDICINE

## 2020-08-11 NOTE — PROCEDURES
ESOPHAGOGASTRODUODENOSCOPY (EGD) & COLONOSCOPY REPORT    Adam Omer    VICTORM ANUEL 12/15/1950 Age 71year old   PCP Wai Marcial MD Endoscopist Colleen Jaffe MD     Date of procedure: 20    Procedure: EGD w/biopsies & Colonoscopy    Pre-operative di well.     Complications: None    EGD findings:      1. Esophagus: The squamocolumnar junction was noted at 39 cm and appeared irregular with small erosions. The GE junction was noted at 39 cm from the incisors. Small hiatal hernia 1-2 cm, sliding.  The esop

## 2020-08-19 ENCOUNTER — TELEPHONE (OUTPATIENT)
Dept: GASTROENTEROLOGY | Facility: CLINIC | Age: 70
End: 2020-08-19

## 2020-08-19 RX ORDER — OMEPRAZOLE 20 MG/1
20 CAPSULE, DELAYED RELEASE ORAL
Qty: 28 CAPSULE | Refills: 0 | Status: SHIPPED | OUTPATIENT
Start: 2020-08-19 | End: 2020-09-02

## 2020-08-19 RX ORDER — BISMUTH SUBSALICYLATE 262 MG/1
2 TABLET, CHEWABLE ORAL 4 TIMES DAILY
Qty: 112 TABLET | Refills: 0 | Status: SHIPPED | OUTPATIENT
Start: 2020-08-19 | End: 2020-09-02

## 2020-08-19 RX ORDER — TETRACYCLINE HYDROCHLORIDE 500 MG/1
500 CAPSULE ORAL 4 TIMES DAILY
Qty: 56 CAPSULE | Refills: 0 | Status: SHIPPED | OUTPATIENT
Start: 2020-08-19 | End: 2020-09-02

## 2020-08-19 RX ORDER — METRONIDAZOLE 250 MG/1
250 TABLET ORAL 4 TIMES DAILY
Qty: 56 TABLET | Refills: 0 | Status: SHIPPED | OUTPATIENT
Start: 2020-08-19 | End: 2020-09-02

## 2020-08-19 NOTE — TELEPHONE ENCOUNTER
Received message from APN from cardiology patient wanted to discuss pathology  Called patient at 1:44pm 8/19/2020  Repeat appointment in 2 months with me or Vanessa Allen to discuss capsule and review labs

## 2020-08-20 ENCOUNTER — TELEPHONE (OUTPATIENT)
Dept: GASTROENTEROLOGY | Facility: CLINIC | Age: 70
End: 2020-08-20

## 2020-08-20 ENCOUNTER — HOSPITAL ENCOUNTER (OUTPATIENT)
Dept: MAMMOGRAPHY | Facility: HOSPITAL | Age: 70
Discharge: HOME | End: 2020-08-20
Attending: INTERNAL MEDICINE
Payer: MEDICARE

## 2020-08-20 DIAGNOSIS — Z12.31 VISIT FOR SCREENING MAMMOGRAM: ICD-10-CM

## 2020-08-20 PROCEDURE — 77063 BREAST TOMOSYNTHESIS BI: CPT | Performed by: INTERNAL MEDICINE

## 2020-08-20 PROCEDURE — 77067 SCR MAMMO BI INCL CAD: CPT | Performed by: INTERNAL MEDICINE

## 2020-08-20 NOTE — TELEPHONE ENCOUNTER
Entered into Epic:     Recall for _CLN___per __Lod[facility]___in _10 years_____  Last FVUM:3-  Next GDE:3-  [facility]

## 2020-08-20 NOTE — TELEPHONE ENCOUNTER
Pt was contacted and discussed MD message below as well as the following w/ patient regarding h. Pylori treatment:    The Helicobacter pylori (H.pylori) bacteria is a very common chronic infection in the stomach.  There is nothing necessarily that you did o have any questions or concerns, please call the office at [phone]. Reminders for GI RN:  1. If MD RX'd -Flagyl (metronidazole)- May cause unpleasant/metallic taste, it is not an indication to stop treatment.  DO NOT DRINK ALCOHOL WHILE ON TREATMENT

## 2020-08-20 NOTE — TELEPHONE ENCOUNTER
----- Message from Chloé Figueroa MD sent at 8/19/2020 12:47 PM CDT -----  GI staff: please place recall in for colonoscopy in 10 years   No etiology for anemia  H pylori-- treatment ordered  Recheck in 8 weeks for clearance

## 2020-08-20 NOTE — TELEPHONE ENCOUNTER
----- Message from Rosetta Antonio MD sent at 8/19/2020 12:47 PM CDT -----  GI staff: please place recall in for colonoscopy in 10 years   No etiology for anemia  H pylori-- treatment ordered  Recheck in 8 weeks for clearance

## 2020-08-20 NOTE — TELEPHONE ENCOUNTER
Entered into Epic:     Recall for _H pylori retest___per _Lodhi____in ___8 weeks___    Next due: 8-      H pylori treatment plan sent through [facility]

## 2020-09-11 ENCOUNTER — TELEPHONE (OUTPATIENT)
Dept: INTERNAL MEDICINE CLINIC | Facility: CLINIC | Age: 70
End: 2020-09-11

## 2020-09-11 ENCOUNTER — LAB ENCOUNTER (OUTPATIENT)
Dept: LAB | Age: 70
End: 2020-09-11
Attending: INTERNAL MEDICINE
Payer: MEDICARE

## 2020-09-11 DIAGNOSIS — I10 HYPERTENSION, BENIGN: ICD-10-CM

## 2020-09-11 LAB
ANION GAP SERPL CALC-SCNC: 3 MMOL/L (ref 0–18)
BUN BLD-MCNC: 17 MG/DL (ref 7–18)
BUN/CREAT SERPL: 18.1 (ref 10–20)
CALCIUM BLD-MCNC: 9.5 MG/DL (ref 8.5–10.1)
CHLORIDE SERPL-SCNC: 108 MMOL/L (ref 98–112)
CO2 SERPL-SCNC: 30 MMOL/L (ref 21–32)
CREAT BLD-MCNC: 0.94 MG/DL (ref 0.55–1.02)
GLUCOSE BLD-MCNC: 91 MG/DL (ref 70–99)
OSMOLALITY SERPL CALC.SUM OF ELEC: 293 MOSM/KG (ref 275–295)
PATIENT FASTING Y/N/NP: YES
POTASSIUM SERPL-SCNC: 5.1 MMOL/L (ref 3.5–5.1)
SODIUM SERPL-SCNC: 141 MMOL/L (ref 136–145)

## 2020-09-11 PROCEDURE — 80048 BASIC METABOLIC PNL TOTAL CA: CPT

## 2020-09-11 PROCEDURE — 36415 COLL VENOUS BLD VENIPUNCTURE: CPT

## 2020-10-09 ENCOUNTER — TELEPHONE (OUTPATIENT)
Dept: GASTROENTEROLOGY | Facility: CLINIC | Age: 70
End: 2020-10-09

## 2020-10-09 DIAGNOSIS — A04.8 H. PYLORI INFECTION: Primary | ICD-10-CM

## 2020-10-09 NOTE — TELEPHONE ENCOUNTER
Patient outreach message received. Patient is due for repeat H Pylori testing.       \"----- Message from Austin Jacinto MD sent at 8/19/2020 12:47 PM CDT -----  GI staff: please place recall in for colonoscopy in 10 years   No etiology for anemia  H pylori--

## 2020-10-09 NOTE — TELEPHONE ENCOUNTER
Patient contacted and message given that patient is due for recall H.Pylori test.  Number for Central Scheduling given. Patient voiced understanding.

## 2020-12-30 ENCOUNTER — TELEPHONE (OUTPATIENT)
Dept: GASTROENTEROLOGY | Facility: CLINIC | Age: 70
End: 2020-12-30

## 2020-12-30 PROBLEM — Z79.01 LONG TERM (CURRENT) USE OF ANTICOAGULANTS: Status: ACTIVE | Noted: 2020-12-30

## 2020-12-30 NOTE — TELEPHONE ENCOUNTER
1st reminder letter mailed out to patient regarding her overdue lab order;     [address], EIA

## 2021-01-07 ENCOUNTER — LAB ENCOUNTER (OUTPATIENT)
Dept: LAB | Age: 71
End: 2021-01-07
Attending: INTERNAL MEDICINE
Payer: MEDICARE

## 2021-01-09 ENCOUNTER — LAB ENCOUNTER (OUTPATIENT)
Dept: LAB | Facility: HOSPITAL | Age: 71
End: 2021-01-09
Attending: INTERNAL MEDICINE
Payer: MEDICARE

## 2021-01-09 DIAGNOSIS — A04.8 H. PYLORI INFECTION: ICD-10-CM

## 2021-01-09 PROCEDURE — 87338 HPYLORI STOOL AG IA: CPT

## 2021-01-10 LAB — H PYLORI AG STL QL IA: NEGATIVE

## 2021-04-15 ENCOUNTER — OFFICE VISIT (OUTPATIENT)
Dept: INTERNAL MEDICINE CLINIC | Facility: CLINIC | Age: 71
End: 2021-04-15
Payer: MEDICARE

## 2021-04-15 ENCOUNTER — LAB ENCOUNTER (OUTPATIENT)
Dept: LAB | Age: 71
End: 2021-04-15
Attending: INTERNAL MEDICINE
Payer: MEDICARE

## 2021-04-15 VITALS
HEIGHT: 65 IN | WEIGHT: 150 LBS | HEART RATE: 50 BPM | DIASTOLIC BLOOD PRESSURE: 62 MMHG | BODY MASS INDEX: 24.99 KG/M2 | SYSTOLIC BLOOD PRESSURE: 163 MMHG

## 2021-04-15 DIAGNOSIS — I35.1 NONRHEUMATIC AORTIC VALVE INSUFFICIENCY: ICD-10-CM

## 2021-04-15 DIAGNOSIS — Z78.0 POSTMENOPAUSAL: ICD-10-CM

## 2021-04-15 DIAGNOSIS — I48.0 PAROXYSMAL ATRIAL FIBRILLATION (HCC): ICD-10-CM

## 2021-04-15 DIAGNOSIS — I10 HYPERTENSION, BENIGN: ICD-10-CM

## 2021-04-15 DIAGNOSIS — I34.0 NONRHEUMATIC MITRAL VALVE REGURGITATION: ICD-10-CM

## 2021-04-15 DIAGNOSIS — Z79.01 LONG TERM (CURRENT) USE OF ANTICOAGULANTS: ICD-10-CM

## 2021-04-15 DIAGNOSIS — E78.5 HYPERLIPIDEMIA, UNSPECIFIED HYPERLIPIDEMIA TYPE: ICD-10-CM

## 2021-04-15 DIAGNOSIS — I27.20 PULMONARY HTN (HCC): ICD-10-CM

## 2021-04-15 DIAGNOSIS — Z00.00 ENCOUNTER FOR MEDICARE ANNUAL WELLNESS EXAM: Primary | ICD-10-CM

## 2021-04-15 DIAGNOSIS — Z12.31 VISIT FOR SCREENING MAMMOGRAM: ICD-10-CM

## 2021-04-15 DIAGNOSIS — D64.9 ANEMIA, UNSPECIFIED TYPE: ICD-10-CM

## 2021-04-15 PROBLEM — I05.1 RHEUMATIC MITRAL REGURGITATION: Status: RESOLVED | Noted: 2019-09-30 | Resolved: 2021-04-15

## 2021-04-15 PROBLEM — R01.1 MURMUR: Status: RESOLVED | Noted: 2018-07-31 | Resolved: 2021-04-15

## 2021-04-15 PROCEDURE — 81015 MICROSCOPIC EXAM OF URINE: CPT

## 2021-04-15 PROCEDURE — 85027 COMPLETE CBC AUTOMATED: CPT

## 2021-04-15 PROCEDURE — 80053 COMPREHEN METABOLIC PANEL: CPT

## 2021-04-15 PROCEDURE — 36415 COLL VENOUS BLD VENIPUNCTURE: CPT

## 2021-04-15 PROCEDURE — 84439 ASSAY OF FREE THYROXINE: CPT

## 2021-04-15 PROCEDURE — 84443 ASSAY THYROID STIM HORMONE: CPT

## 2021-04-15 PROCEDURE — 80061 LIPID PANEL: CPT

## 2021-04-15 PROCEDURE — G0439 PPPS, SUBSEQ VISIT: HCPCS | Performed by: INTERNAL MEDICINE

## 2021-04-15 NOTE — PATIENT INSTRUCTIONS
Sherley Campos's SCREENING SCHEDULE   Tests on this list are recommended by your physician but may not be covered, or covered at this frequency, by your insurer. Please check with your insurance carrier before scheduling to verify coverage.    Kaushik Patton abnormal Colonoscopy due on 08/11/2030 Update [facility] if applicable    Flex Sigmoidoscopy Screen  Covered every 5 years No results found for this or any previous visit. No flowsheet data found.      Fecal Occult Blood   Covered Annually No result 65th birthday    Pneumococcal 23 (Pneumovax)  Covered Once after 72 Orders placed or performed in visit on 05/22/19   • PNEUMOCOCCAL IMM (PNEUMOVAX)    Please get once after your 65th birthday    Hepatitis B for Moderate/High Risk       No orders found for

## 2021-04-15 NOTE — PROGRESS NOTES
HPI:   Shaan Rojas is a 79year old female who presents for a Medicare Subsequent Annual Wellness visit (Pt already had Initial Annual Wellness).       Her last annual assessment has been over 1 year: Annual Physical due on 05/22/2020         Fall/Ris Active Problem List:     Hypertension, benign     Hyperlipidemia     Pulmonary HTN ([facility] [address]     Long term (current) use of anticoagulants    Wt Readings from Last 3 Encounters:  04/15/21 : 150 lb (68 kg)  03/17/21 : 147 lb (66.7 kg)  10/14/20 : 147 lb (66.7 k hypertension, and Vertigo.     She  has a past surgical history that includes electrocardiogram, complete (05-); samy localization wire 1 site left (cpt=19281) (1998); samy localization wire 1 site right (cpt=19281) (1998); hysterectomy; and colonoscop encounter: 150 lb (68 kg).     Medicare Hearing Assessment  (Required for AWV/SWV)    Hearing Screening    Time taken: 4/15/2021 10:35 AM  Entry User: Lynn Kemp MA  Screening Method: Questionnaire  I have a problem hearing over the telephone: No I hav Eyes:      General: No scleral icterus. Right eye: No discharge. Left eye: No discharge. Conjunctiva/sclera: Conjunctivae normal.      Pupils: Pupils are equal, round, and reactive to light. Neck:      Thyroid: No thyromegaly.    Car dexa scan  And routine eye exam advised.       (I10) Hypertension, benign  Plan: BP (!) 163/62 (BP Location: Right arm, Patient Position: Sitting, Cuff Size: adult)   Pulse 50   Ht 5' 5\" (1.651 m)   Wt 150 lb (68 kg)   BMI 24.96 kg/m²   Uncontrolled monito state?: Fair  How do you maintain positive mental well-being?: Visiting Family      This section provided for quick review of chart, separate sheet to patient  [address] Internal Lab or Procedure External Lab or Proced once after your 65th birthday    Pneumococcal 23 (Pneumovax)  Covered Once after 65 05/22/2019 Please get once after your 65th birthday    Hepatitis B for Moderate/High Risk No vaccine history found Medium/high risk factors:   End-stage renal disease   Hem

## 2021-06-18 PROBLEM — R00.2 PALPITATIONS: Status: ACTIVE | Noted: 2021-06-18

## 2021-06-18 PROBLEM — I34.0 NONRHEUMATIC MITRAL VALVE REGURGITATION: Status: ACTIVE | Noted: 2021-06-18

## 2021-07-21 ENCOUNTER — NURSE ONLY (OUTPATIENT)
Dept: LAB | Facility: HOSPITAL | Age: 71
End: 2021-07-21
Attending: INTERNAL MEDICINE
Payer: MEDICARE

## 2021-07-21 DIAGNOSIS — Z01.818 PREOP TESTING: ICD-10-CM

## 2021-07-21 LAB
ANION GAP SERPL CALC-SCNC: 7 MMOL/L (ref 0–18)
BASOPHILS # BLD AUTO: 0.03 X10(3) UL (ref 0–0.2)
BASOPHILS NFR BLD AUTO: 0.6 %
BUN BLD-MCNC: 20 MG/DL (ref 7–18)
BUN/CREAT SERPL: 21.5 (ref 10–20)
CALCIUM BLD-MCNC: 9.1 MG/DL (ref 8.5–10.1)
CHLORIDE SERPL-SCNC: 109 MMOL/L (ref 98–112)
CO2 SERPL-SCNC: 26 MMOL/L (ref 21–32)
CREAT BLD-MCNC: 0.93 MG/DL
DEPRECATED RDW RBC AUTO: 42.9 FL (ref 35.1–46.3)
EOSINOPHIL # BLD AUTO: 0.09 X10(3) UL (ref 0–0.7)
EOSINOPHIL NFR BLD AUTO: 1.8 %
ERYTHROCYTE [DISTWIDTH] IN BLOOD BY AUTOMATED COUNT: 13.2 % (ref 11–15)
GLUCOSE BLD-MCNC: 86 MG/DL (ref 70–99)
HCT VFR BLD AUTO: 38.3 %
HGB BLD-MCNC: 12.1 G/DL
IMM GRANULOCYTES # BLD AUTO: 0.02 X10(3) UL (ref 0–1)
IMM GRANULOCYTES NFR BLD: 0.4 %
LYMPHOCYTES # BLD AUTO: 0.86 X10(3) UL (ref 1–4)
LYMPHOCYTES NFR BLD AUTO: 17.2 %
MCH RBC QN AUTO: 27.9 PG (ref 26–34)
MCHC RBC AUTO-ENTMCNC: 31.6 G/DL (ref 31–37)
MCV RBC AUTO: 88.5 FL
MONOCYTES # BLD AUTO: 0.41 X10(3) UL (ref 0.1–1)
MONOCYTES NFR BLD AUTO: 8.2 %
NEUTROPHILS # BLD AUTO: 3.6 X10 (3) UL (ref 1.5–7.7)
NEUTROPHILS # BLD AUTO: 3.6 X10(3) UL (ref 1.5–7.7)
NEUTROPHILS NFR BLD AUTO: 71.8 %
OSMOLALITY SERPL CALC.SUM OF ELEC: 296 MOSM/KG (ref 275–295)
PATIENT FASTING Y/N/NP: NO
PLATELET # BLD AUTO: 180 10(3)UL (ref 150–450)
POTASSIUM SERPL-SCNC: 4.3 MMOL/L (ref 3.5–5.1)
RBC # BLD AUTO: 4.33 X10(6)UL
SODIUM SERPL-SCNC: 142 MMOL/L (ref 136–145)
WBC # BLD AUTO: 5 X10(3) UL (ref 4–11)

## 2021-07-21 PROCEDURE — 93010 ELECTROCARDIOGRAM REPORT: CPT | Performed by: INTERNAL MEDICINE

## 2021-07-21 PROCEDURE — 93005 ELECTROCARDIOGRAM TRACING: CPT

## 2021-07-21 PROCEDURE — 85025 COMPLETE CBC W/AUTO DIFF WBC: CPT

## 2021-07-21 PROCEDURE — 36415 COLL VENOUS BLD VENIPUNCTURE: CPT

## 2021-07-21 PROCEDURE — 80048 BASIC METABOLIC PNL TOTAL CA: CPT

## 2021-07-22 RX ORDER — SODIUM CHLORIDE 9 MG/ML
INJECTION, SOLUTION INTRAVENOUS
Status: CANCELLED | OUTPATIENT
Start: 2021-07-23 | End: 2021-07-23

## 2021-07-26 ENCOUNTER — HOSPITAL ENCOUNTER (OUTPATIENT)
Dept: CV DIAGNOSTICS | Facility: HOSPITAL | Age: 71
Discharge: HOME | End: 2021-07-26
Attending: INTERNAL MEDICINE
Payer: MEDICARE

## 2021-07-26 ENCOUNTER — HOSPITAL ENCOUNTER (OUTPATIENT)
Dept: INTERVENTIONAL RADIOLOGY/VASCULAR | Facility: HOSPITAL | Age: 71
Discharge: HOME | End: 2021-07-26
Attending: INTERNAL MEDICINE | Admitting: INTERNAL MEDICINE
Payer: MEDICARE

## 2021-07-26 ENCOUNTER — HOSPITAL ENCOUNTER (OUTPATIENT)
Dept: INTERVENTIONAL RADIOLOGY/VASCULAR | Facility: HOSPITAL | Age: 71
Discharge: HOME | End: 2021-07-26
Attending: INTERNAL MEDICINE
Payer: MEDICARE

## 2021-07-26 VITALS
SYSTOLIC BLOOD PRESSURE: 147 MMHG | OXYGEN SATURATION: 93 % | TEMPERATURE: 98 F | RESPIRATION RATE: 18 BRPM | DIASTOLIC BLOOD PRESSURE: 55 MMHG | HEART RATE: 73 BPM

## 2021-07-26 VITALS
SYSTOLIC BLOOD PRESSURE: 154 MMHG | DIASTOLIC BLOOD PRESSURE: 64 MMHG | HEIGHT: 65 IN | RESPIRATION RATE: 18 BRPM | BODY MASS INDEX: 24.83 KG/M2 | OXYGEN SATURATION: 93 % | HEART RATE: 51 BPM | WEIGHT: 149 LBS

## 2021-07-26 DIAGNOSIS — R93.1 ABNORMAL ECHOCARDIOGRAM: ICD-10-CM

## 2021-07-26 DIAGNOSIS — I34.0 NONRHEUMATIC MITRAL VALVE REGURGITATION: ICD-10-CM

## 2021-07-26 DIAGNOSIS — Z01.818 PREOP TESTING: Primary | ICD-10-CM

## 2021-07-26 LAB
INR BLD: 1.44 (ref 0.9–1.2)
PROTHROMBIN TIME: 17.3 SECONDS (ref 11.8–14.5)

## 2021-07-26 PROCEDURE — 93325 DOPPLER ECHO COLOR FLOW MAPG: CPT | Performed by: INTERNAL MEDICINE

## 2021-07-26 PROCEDURE — 36415 COLL VENOUS BLD VENIPUNCTURE: CPT

## 2021-07-26 PROCEDURE — B2151ZZ FLUOROSCOPY OF LEFT HEART USING LOW OSMOLAR CONTRAST: ICD-10-PCS | Performed by: INTERNAL MEDICINE

## 2021-07-26 PROCEDURE — 93320 DOPPLER ECHO COMPLETE: CPT | Performed by: INTERNAL MEDICINE

## 2021-07-26 PROCEDURE — 93312 ECHO TRANSESOPHAGEAL: CPT

## 2021-07-26 PROCEDURE — B2111ZZ FLUOROSCOPY OF MULTIPLE CORONARY ARTERIES USING LOW OSMOLAR CONTRAST: ICD-10-PCS | Performed by: INTERNAL MEDICINE

## 2021-07-26 PROCEDURE — 85610 PROTHROMBIN TIME: CPT | Performed by: INTERNAL MEDICINE

## 2021-07-26 PROCEDURE — 93460 R&L HRT ART/VENTRICLE ANGIO: CPT

## 2021-07-26 PROCEDURE — B24BZZ4 ULTRASONOGRAPHY OF HEART WITH AORTA, TRANSESOPHAGEAL: ICD-10-PCS | Performed by: INTERNAL MEDICINE

## 2021-07-26 PROCEDURE — 99152 MOD SED SAME PHYS/QHP 5/>YRS: CPT

## 2021-07-26 PROCEDURE — 4A023N8 MEASUREMENT OF CARDIAC SAMPLING AND PRESSURE, BILATERAL, PERCUTANEOUS APPROACH: ICD-10-PCS | Performed by: INTERNAL MEDICINE

## 2021-07-26 RX ORDER — LIDOCAINE HYDROCHLORIDE 20 MG/ML
INJECTION, SOLUTION EPIDURAL; INFILTRATION; INTRACAUDAL; PERINEURAL
Status: COMPLETED
Start: 2021-07-26 | End: 2021-07-26

## 2021-07-26 RX ORDER — SODIUM CHLORIDE 9 MG/ML
INJECTION, SOLUTION INTRAVENOUS
Status: COMPLETED | OUTPATIENT
Start: 2021-07-26 | End: 2021-07-26

## 2021-07-26 RX ORDER — SODIUM CHLORIDE 0.9 % (FLUSH) 0.9 %
10 SYRINGE (ML) INJECTION AS NEEDED
Status: DISCONTINUED | OUTPATIENT
Start: 2021-07-26 | End: 2021-07-26

## 2021-07-26 RX ORDER — MIDAZOLAM HYDROCHLORIDE 1 MG/ML
INJECTION INTRAMUSCULAR; INTRAVENOUS
Status: DISCONTINUED
Start: 2021-07-26 | End: 2021-07-26 | Stop reason: WASHOUT

## 2021-07-26 RX ORDER — SODIUM CHLORIDE 9 MG/ML
INJECTION, SOLUTION INTRAVENOUS
Status: DISCONTINUED | OUTPATIENT
Start: 2021-07-26 | End: 2021-07-26

## 2021-07-26 RX ORDER — MIDAZOLAM HYDROCHLORIDE 1 MG/ML
INJECTION INTRAMUSCULAR; INTRAVENOUS
Status: DISCONTINUED
Start: 2021-07-26 | End: 2021-07-26

## 2021-07-26 RX ADMIN — SODIUM CHLORIDE: 9 INJECTION, SOLUTION INTRAVENOUS at 08:45:00

## 2021-07-26 NOTE — PROGRESS NOTES
Outpatient Surgery Brief Discharge Summary         Patient ID:  Whit Alonso  [id number]  79year old  12/15/1950    Discharge Diagnoses: Mitral regurgitation, pulmonary hypertension, atrial fibrillation    Procedures: DUONG; right and left heart cath w

## 2021-07-26 NOTE — IVS NOTE
Patient awake and alert x 4, VSS, BARROW, No p/n/v. Right groin dry, no hematoma, dressing dry intact, instructions given to patient and spouse, they stated understanding. Patient able to urinate, drink and ambulate before discharge.

## 2021-07-26 NOTE — PROCEDURES
[facility] Cardiac [facility] Procedure Note    Debbra Duverney Patient Status:  Outpatient in a Bed    12/15/1950 MRN [id number]   Location [facility] Attending Ashlee Morales MD   Hosp Day # 0 PCP Joana Arias MD maintained by RN and moderate conscious sedation of 25 mg versed and no fentanyl was given. Patient was assessed and monitoring of oxygen, heart rate and blood pressure by nurse and myself during the exam 22 minutes.       Jenn Saleh MD  07/26/21

## 2021-07-30 ENCOUNTER — APPOINTMENT (OUTPATIENT)
Dept: GENERAL RADIOLOGY | Facility: HOSPITAL | Age: 71
DRG: 292 | End: 2021-07-30
Payer: MEDICARE

## 2021-07-30 ENCOUNTER — HOSPITAL ENCOUNTER (OUTPATIENT)
Age: 71
Discharge: ANOTHER HOSPITAL | DRG: 292 | End: 2021-07-30
Payer: MEDICARE

## 2021-07-30 ENCOUNTER — HOSPITAL ENCOUNTER (INPATIENT)
Facility: HOSPITAL | Age: 71
LOS: 4 days | Discharge: HOME | DRG: 292 | End: 2021-08-03
Attending: EMERGENCY MEDICINE | Admitting: HOSPITALIST
Payer: MEDICARE

## 2021-07-30 VITALS
HEART RATE: 88 BPM | TEMPERATURE: 99 F | SYSTOLIC BLOOD PRESSURE: 147 MMHG | RESPIRATION RATE: 24 BRPM | OXYGEN SATURATION: 92 % | DIASTOLIC BLOOD PRESSURE: 65 MMHG

## 2021-07-30 DIAGNOSIS — R05.9 COUGH: ICD-10-CM

## 2021-07-30 DIAGNOSIS — I50.9 ACUTE CONGESTIVE HEART FAILURE, UNSPECIFIED HEART FAILURE TYPE (HCC): Primary | ICD-10-CM

## 2021-07-30 DIAGNOSIS — I10 HYPERTENSION, BENIGN: ICD-10-CM

## 2021-07-30 DIAGNOSIS — R06.00 DYSPNEA, UNSPECIFIED TYPE: ICD-10-CM

## 2021-07-30 DIAGNOSIS — R06.82 TACHYPNEA: Primary | ICD-10-CM

## 2021-07-30 DIAGNOSIS — I27.20 PULMONARY HTN (HCC): ICD-10-CM

## 2021-07-30 DIAGNOSIS — I48.20 ATRIAL FIBRILLATION, CHRONIC (HCC): ICD-10-CM

## 2021-07-30 DIAGNOSIS — I48.0 PAROXYSMAL ATRIAL FIBRILLATION (HCC): ICD-10-CM

## 2021-07-30 DIAGNOSIS — I08.0 MITRAL VALVE INSUFFICIENCY AND AORTIC VALVE INSUFFICIENCY: ICD-10-CM

## 2021-07-30 DIAGNOSIS — R09.02 HYPOXIA: ICD-10-CM

## 2021-07-30 LAB
ANION GAP SERPL CALC-SCNC: 9 MMOL/L (ref 0–18)
BASOPHILS # BLD AUTO: 0.01 X10(3) UL (ref 0–0.2)
BASOPHILS NFR BLD AUTO: 0.2 %
BUN BLD-MCNC: 17 MG/DL (ref 7–18)
BUN/CREAT SERPL: 16 (ref 10–20)
CALCIUM BLD-MCNC: 8.8 MG/DL (ref 8.5–10.1)
CHLORIDE SERPL-SCNC: 107 MMOL/L (ref 98–112)
CO2 SERPL-SCNC: 21 MMOL/L (ref 21–32)
CREAT BLD-MCNC: 1.06 MG/DL
DEPRECATED RDW RBC AUTO: 43.4 FL (ref 35.1–46.3)
EOSINOPHIL # BLD AUTO: 0 X10(3) UL (ref 0–0.7)
EOSINOPHIL NFR BLD AUTO: 0 %
ERYTHROCYTE [DISTWIDTH] IN BLOOD BY AUTOMATED COUNT: 13.7 % (ref 11–15)
GLUCOSE BLD-MCNC: 133 MG/DL (ref 70–99)
HCT VFR BLD AUTO: 35.7 %
HGB BLD-MCNC: 11.7 G/DL
IMM GRANULOCYTES # BLD AUTO: 0.02 X10(3) UL (ref 0–1)
IMM GRANULOCYTES NFR BLD: 0.4 %
LYMPHOCYTES # BLD AUTO: 0.35 X10(3) UL (ref 1–4)
LYMPHOCYTES NFR BLD AUTO: 6.2 %
MCH RBC QN AUTO: 28.5 PG (ref 26–34)
MCHC RBC AUTO-ENTMCNC: 32.8 G/DL (ref 31–37)
MCV RBC AUTO: 86.9 FL
MONOCYTES # BLD AUTO: 0.42 X10(3) UL (ref 0.1–1)
MONOCYTES NFR BLD AUTO: 7.4 %
NEUTROPHILS # BLD AUTO: 4.87 X10 (3) UL (ref 1.5–7.7)
NEUTROPHILS # BLD AUTO: 4.87 X10(3) UL (ref 1.5–7.7)
NEUTROPHILS NFR BLD AUTO: 85.8 %
NT-PROBNP SERPL-MCNC: ABNORMAL PG/ML (ref ?–125)
OSMOLALITY SERPL CALC.SUM OF ELEC: 287 MOSM/KG (ref 275–295)
PLATELET # BLD AUTO: 143 10(3)UL (ref 150–450)
POTASSIUM SERPL-SCNC: 3.9 MMOL/L (ref 3.5–5.1)
PROCALCITONIN SERPL-MCNC: 0.09 NG/ML (ref ?–0.16)
RBC # BLD AUTO: 4.11 X10(6)UL
SARS-COV-2 RNA RESP QL NAA+PROBE: NOT DETECTED
SODIUM SERPL-SCNC: 137 MMOL/L (ref 136–145)
TROPONIN I SERPL-MCNC: <0.045 NG/ML (ref ?–0.04)
WBC # BLD AUTO: 5.7 X10(3) UL (ref 4–11)

## 2021-07-30 PROCEDURE — 99222 1ST HOSP IP/OBS MODERATE 55: CPT | Performed by: HOSPITALIST

## 2021-07-30 PROCEDURE — 99215 OFFICE O/P EST HI 40 MIN: CPT | Performed by: NURSE PRACTITIONER

## 2021-07-30 PROCEDURE — 71045 X-RAY EXAM CHEST 1 VIEW: CPT

## 2021-07-30 RX ORDER — FUROSEMIDE 10 MG/ML
40 INJECTION INTRAMUSCULAR; INTRAVENOUS ONCE
Status: COMPLETED | OUTPATIENT
Start: 2021-07-30 | End: 2021-07-30

## 2021-07-30 RX ORDER — FUROSEMIDE 10 MG/ML
20 INJECTION INTRAMUSCULAR; INTRAVENOUS ONCE
Status: COMPLETED | OUTPATIENT
Start: 2021-07-30 | End: 2021-07-30

## 2021-07-31 LAB
ANION GAP SERPL CALC-SCNC: 8 MMOL/L (ref 0–18)
BASOPHILS # BLD AUTO: 0.02 X10(3) UL (ref 0–0.2)
BASOPHILS NFR BLD AUTO: 0.3 %
BUN BLD-MCNC: 17 MG/DL (ref 7–18)
BUN/CREAT SERPL: 15.5 (ref 10–20)
C DIFF TOX B STL QL: NEGATIVE
CALCIUM BLD-MCNC: 9.2 MG/DL (ref 8.5–10.1)
CHLORIDE SERPL-SCNC: 104 MMOL/L (ref 98–112)
CO2 SERPL-SCNC: 26 MMOL/L (ref 21–32)
CREAT BLD-MCNC: 1.1 MG/DL
DEPRECATED RDW RBC AUTO: 43.6 FL (ref 35.1–46.3)
EOSINOPHIL # BLD AUTO: 0.02 X10(3) UL (ref 0–0.7)
EOSINOPHIL NFR BLD AUTO: 0.3 %
ERYTHROCYTE [DISTWIDTH] IN BLOOD BY AUTOMATED COUNT: 13.5 % (ref 11–15)
GLUCOSE BLD-MCNC: 96 MG/DL (ref 70–99)
HCT VFR BLD AUTO: 39.6 %
HGB BLD-MCNC: 12.8 G/DL
IMM GRANULOCYTES # BLD AUTO: 0.03 X10(3) UL (ref 0–1)
IMM GRANULOCYTES NFR BLD: 0.5 %
INR BLD: 1.89 (ref 0.9–1.2)
LYMPHOCYTES # BLD AUTO: 0.58 X10(3) UL (ref 1–4)
LYMPHOCYTES NFR BLD AUTO: 9.9 %
MCH RBC QN AUTO: 28.2 PG (ref 26–34)
MCHC RBC AUTO-ENTMCNC: 32.3 G/DL (ref 31–37)
MCV RBC AUTO: 87.2 FL
MONOCYTES # BLD AUTO: 0.55 X10(3) UL (ref 0.1–1)
MONOCYTES NFR BLD AUTO: 9.4 %
NEUTROPHILS # BLD AUTO: 4.66 X10 (3) UL (ref 1.5–7.7)
NEUTROPHILS # BLD AUTO: 4.66 X10(3) UL (ref 1.5–7.7)
NEUTROPHILS NFR BLD AUTO: 79.6 %
OSMOLALITY SERPL CALC.SUM OF ELEC: 287 MOSM/KG (ref 275–295)
PLATELET # BLD AUTO: 157 10(3)UL (ref 150–450)
POTASSIUM SERPL-SCNC: 3.9 MMOL/L (ref 3.5–5.1)
PROCALCITONIN SERPL-MCNC: 0.13 NG/ML (ref ?–0.16)
PROTHROMBIN TIME: 21.4 SECONDS (ref 11.8–14.5)
RBC # BLD AUTO: 4.54 X10(6)UL
SODIUM SERPL-SCNC: 138 MMOL/L (ref 136–145)
WBC # BLD AUTO: 5.9 X10(3) UL (ref 4–11)

## 2021-07-31 PROCEDURE — 99233 SBSQ HOSP IP/OBS HIGH 50: CPT | Performed by: INTERNAL MEDICINE

## 2021-07-31 RX ORDER — PANTOPRAZOLE SODIUM 40 MG/1
40 TABLET, DELAYED RELEASE ORAL
Status: DISCONTINUED | OUTPATIENT
Start: 2021-07-31 | End: 2021-08-03

## 2021-07-31 RX ORDER — ONDANSETRON 2 MG/ML
4 INJECTION INTRAMUSCULAR; INTRAVENOUS EVERY 6 HOURS PRN
Status: DISCONTINUED | OUTPATIENT
Start: 2021-07-31 | End: 2021-08-03

## 2021-07-31 RX ORDER — WARFARIN SODIUM 5 MG/1
5 TABLET ORAL
Status: DISCONTINUED | OUTPATIENT
Start: 2021-07-31 | End: 2021-08-03

## 2021-07-31 RX ORDER — METOPROLOL SUCCINATE 50 MG/1
50 TABLET, EXTENDED RELEASE ORAL EVERY EVENING
Status: DISCONTINUED | OUTPATIENT
Start: 2021-07-31 | End: 2021-07-31

## 2021-07-31 RX ORDER — ENALAPRIL MALEATE 10 MG/1
10 TABLET ORAL 2 TIMES DAILY
Status: DISCONTINUED | OUTPATIENT
Start: 2021-07-31 | End: 2021-08-01

## 2021-07-31 RX ORDER — LABETALOL HYDROCHLORIDE 5 MG/ML
10 INJECTION, SOLUTION INTRAVENOUS EVERY 4 HOURS PRN
Status: DISCONTINUED | OUTPATIENT
Start: 2021-07-31 | End: 2021-08-03

## 2021-07-31 RX ORDER — ACETAMINOPHEN 325 MG/1
650 TABLET ORAL EVERY 6 HOURS PRN
Status: DISCONTINUED | OUTPATIENT
Start: 2021-07-31 | End: 2021-08-03

## 2021-07-31 RX ORDER — WARFARIN SODIUM 2.5 MG/1
2.5 TABLET ORAL
Status: DISCONTINUED | OUTPATIENT
Start: 2021-08-02 | End: 2021-08-03

## 2021-07-31 RX ORDER — HYDROCODONE BITARTRATE AND ACETAMINOPHEN 5; 325 MG/1; MG/1
1 TABLET ORAL EVERY 6 HOURS PRN
Status: DISCONTINUED | OUTPATIENT
Start: 2021-07-31 | End: 2021-08-03

## 2021-07-31 RX ORDER — FUROSEMIDE 10 MG/ML
40 INJECTION INTRAMUSCULAR; INTRAVENOUS
Status: DISCONTINUED | OUTPATIENT
Start: 2021-07-31 | End: 2021-08-01

## 2021-07-31 RX ORDER — METOPROLOL SUCCINATE 50 MG/1
50 TABLET, EXTENDED RELEASE ORAL
Status: DISCONTINUED | OUTPATIENT
Start: 2021-07-31 | End: 2021-08-01

## 2021-07-31 RX ORDER — ATORVASTATIN CALCIUM 10 MG/1
10 TABLET, FILM COATED ORAL EVERY EVENING
Status: DISCONTINUED | OUTPATIENT
Start: 2021-07-31 | End: 2021-08-03

## 2021-07-31 RX ORDER — ZOLPIDEM TARTRATE 5 MG/1
5 TABLET ORAL NIGHTLY PRN
Status: DISCONTINUED | OUTPATIENT
Start: 2021-07-31 | End: 2021-08-03

## 2021-07-31 RX ORDER — FUROSEMIDE 10 MG/ML
40 INJECTION INTRAMUSCULAR; INTRAVENOUS DAILY
Status: DISCONTINUED | OUTPATIENT
Start: 2021-07-31 | End: 2021-07-31

## 2021-07-31 RX ORDER — HYDRALAZINE HYDROCHLORIDE 20 MG/ML
10 INJECTION INTRAMUSCULAR; INTRAVENOUS EVERY 4 HOURS PRN
Status: DISCONTINUED | OUTPATIENT
Start: 2021-07-31 | End: 2021-08-03

## 2021-07-31 NOTE — ED QUICK NOTES
Orders for admission, patient is aware of plan and ready to go upstairs.  Any questions, please call ED RN nara  at extension [phone]   Type of COVID test sent:  COVID Suspicion level: Low     Titratable drug(s) infusing:none  Rate:    LOC at time of transport

## 2021-07-31 NOTE — ED INITIAL ASSESSMENT (HPI)
C/o SOB fever coughing for 3 days  Denies chest pain   Pt had angiogram Monday  Tylenol given @ 530 pm today

## 2021-07-31 NOTE — CONSULTS
[facility] Cardiology Consultation    Laurita Martínez Patient Status:  Inpatient    12/15/1950 MRN [id number]   Location [facility] 3W/SW Attending Edda Holbrook MD   Hosp Day # 1 PCP Kiana Hallman MD     Reason for Consultation:  CHF      His Once per day on Mon Wed Fri   • pantoprazole  40 mg Oral QAM AC   • metoprolol succinate  50 mg Oral Daily Beta Blocker   • furosemide  40 mg Intravenous BID (Diuretic)       Continuous Infusions:      Social History:   reports that she quit smoking about fever  2. Acute diastolic CHF  3. 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 4. moderate MR  5. moderate pulmonary HTN   6. Afib on coumadin with subtherapeutic INR  7. HTN           Recommend:  1. Cont iv diuresis  2.  Evy Harder

## 2021-07-31 NOTE — ED PROVIDER NOTES
Patient Seen in: [facility]      History   Patient presents with:  Fever  Difficulty Breathing    Stated Complaint: Cough, fever    HPI/Subjective:   Claudia Stanley is a 66-year-old female who presents to the immediate care planing of [facility] Yes      Comment: rare    Drug use: No             Review of Systems   Constitutional: Positive for fever. HENT: Negative. Eyes: Negative. Respiratory: Positive for cough and shortness of breath. Cardiovascular: Negative.   Negative for chest annie rales present. Comments: Lungs with Rales/rhonchi to left lung field with expiratory wheezing. Right lung field with mildly diminished breath sounds to bases otherwise clear.    Abdominal:      General: Bowel sounds are normal.   Musculoskeletal:

## 2021-07-31 NOTE — ED PROVIDER NOTES
Patient Seen in: [facility] Emergency Department      History   Patient presents with:  Difficulty Breathing  Fever    Stated Complaint: sob, fever    HPI/Subjective:   HPI    The patient is a 72-year-old female with a history of hypertension atri and nursing note reviewed. Constitutional:       General: She is not in acute distress. Appearance: She is well-developed. She is ill-appearing. HENT:      Head: Normocephalic and atraumatic.       Mouth/Throat:      Mouth: Mucous membranes are mois following components:    Pro-Beta Natriuretic Peptide 12,389 (*)     All other components within normal limits   CBC W/ DIFFERENTIAL - Abnormal; Notable for the following components:    HGB 11.7 (*)     .0 (*)     Lymphocyte Absolute 0.35 (*)     Al disposition: 7/30/2021  9:59 PM                                  Disposition and Plan     Clinical Impression:  Acute congestive heart failure, unspecified heart failure type ([facility] [address]  (primary encounter diagnosis)  [facility], [facility]     Disp

## 2021-07-31 NOTE — PROGRESS NOTES
[facility]    Progress Note    Avila Mccloud Patient Status:  Inpatient    12/15/1950 MRN [id number]   Location [facility] 3W/SW Attending Oh Aguilar MD   [facility] Day # 1 PCP Kristal Sanchez MD     CC: Cough, shortness o (Cholecalciferol)  1,000 Units Oral Daily   • metoprolol succinate  50 mg Oral QPM   • enalapril  10 mg Oral BID   • warfarin  5 mg Oral Once per day on Sun Tue Thu Sat   • [START ON 8/2/2021] warfarin  2.5 mg Oral Once per day on Mon Wed Fri   • furosemid EF 65-70% 7/26/21  - Diuresis with Lasix 40mg IV BID  -Recent echo noted   - statin, ACEi, and BB.   - Strict I&Os, Daily weights, Fluid restriction  -Follows with Dr. Jona Burnett, Cardiology on consult, appreciate recs.          [facility], [facility] ([facility]

## 2021-07-31 NOTE — ED INITIAL ASSESSMENT (HPI)
The patient arrived via EMS reporting shortness of breath and fever of 102 at home today. The patient reports that she had a angiogram on Monday.

## 2021-07-31 NOTE — PLAN OF CARE
Paulette Denis is from home with her . She is alert and orientated x4. She is on 2.5L nasal cannula and does not wear oxygen at home. She is independent with activity. She is continent. Admission completed and  at bedside during admission.  Orientated Progressing     Problem: SAFETY ADULT - FALL  Goal: Free from fall injury  Description: INTERVENTIONS:  - Assess pt frequently for physical needs  - Identify cognitive and physical deficits and behaviors that affect risk of falls.   - [place] fall precaut

## 2021-07-31 NOTE — PLAN OF CARE
Problem: Patient Centered Care  Goal: Patient preferences are identified and integrated in the patient's plan of care  Description: Interventions:  - What would you like us to know as we care for you?  I live at home with my   - Provide timely, com - ADULT  Goal: Absence of fever/infection during anticipated neutropenic period  Description: INTERVENTIONS  - Monitor WBC  - Administer growth factors as ordered  - Implement neutropenic guidelines  Outcome: Progressing     Problem: SAFETY ADULT - FALL  G behavior  - Position to facilitate oxygenation and minimize respiratory effort  - Oxygen supplementation based on oxygen saturation or ABGs  - Provide Smoking Cessation handout, if applicable  - Encourage broncho-pulmonary hygiene including cough, deep aravind

## 2021-07-31 NOTE — H&P
[address] Patient Status:  Inpatient    12/15/1950 MRN [id number]   Location [facility] 3W/SW Attending Ginny Chavez MD   Hosp Day # 1 PCP Ulices Luke MD     Date:  2021  Da death   • Cancer Father         Liver cancer   • Diabetes Maternal Grandmother    • Breast Cancer Sister 39      reports that she quit smoking about 40 years ago. She has a 15.00 pack-year smoking history. She quit smokeless tobacco use about 20 years ago. Negative. Immunologic:  Negative. Musculoskeletal:  Negative. Integumentary:  Negative. Neurologic:  Negative. Psychiatric:  Negative.   ROS reviewed as documented in chart    Physical Exam:  Temp:  [98.3 °F (36.8 °C)-99 °F (37.2 °C)] 98.3 °F (36.8 °C) superimposed pneumonia.     Dictated by (CST): Jesus Brandon MD on 7/30/2021 at 9:17 PM     Finalized by (CST): Esther Horta MD on 7/30/2021 at 9:19 PM             Assessment and Plan:    Acute on chronic diastolic heart failure  Patien

## 2021-07-31 NOTE — ED INITIAL ASSESSMENT (HPI)
EMS states that the Pt had a cardiac cath Monday. Now C/O shortness of breath, weak and fever.  No N/V or chest pain

## 2021-08-01 LAB
ANION GAP SERPL CALC-SCNC: 7 MMOL/L (ref 0–18)
BASOPHILS # BLD AUTO: 0.01 X10(3) UL (ref 0–0.2)
BASOPHILS NFR BLD AUTO: 0.2 %
BUN BLD-MCNC: 40 MG/DL (ref 7–18)
BUN/CREAT SERPL: 23.5 (ref 10–20)
CALCIUM BLD-MCNC: 8.8 MG/DL (ref 8.5–10.1)
CHLORIDE SERPL-SCNC: 103 MMOL/L (ref 98–112)
CO2 SERPL-SCNC: 27 MMOL/L (ref 21–32)
CREAT BLD-MCNC: 1.7 MG/DL
DEPRECATED RDW RBC AUTO: 43.4 FL (ref 35.1–46.3)
EOSINOPHIL # BLD AUTO: 0.09 X10(3) UL (ref 0–0.7)
EOSINOPHIL NFR BLD AUTO: 1.6 %
ERYTHROCYTE [DISTWIDTH] IN BLOOD BY AUTOMATED COUNT: 13.5 % (ref 11–15)
GLUCOSE BLD-MCNC: 111 MG/DL (ref 70–99)
HCT VFR BLD AUTO: 36.8 %
HGB BLD-MCNC: 11.9 G/DL
IMM GRANULOCYTES # BLD AUTO: 0.02 X10(3) UL (ref 0–1)
IMM GRANULOCYTES NFR BLD: 0.4 %
INR BLD: 1.96 (ref 0.9–1.2)
LYMPHOCYTES # BLD AUTO: 0.83 X10(3) UL (ref 1–4)
LYMPHOCYTES NFR BLD AUTO: 15.1 %
MCH RBC QN AUTO: 28.1 PG (ref 26–34)
MCHC RBC AUTO-ENTMCNC: 32.3 G/DL (ref 31–37)
MCV RBC AUTO: 87 FL
MONOCYTES # BLD AUTO: 0.68 X10(3) UL (ref 0.1–1)
MONOCYTES NFR BLD AUTO: 12.4 %
NEUTROPHILS # BLD AUTO: 3.87 X10 (3) UL (ref 1.5–7.7)
NEUTROPHILS # BLD AUTO: 3.87 X10(3) UL (ref 1.5–7.7)
NEUTROPHILS NFR BLD AUTO: 70.3 %
OSMOLALITY SERPL CALC.SUM OF ELEC: 294 MOSM/KG (ref 275–295)
PLATELET # BLD AUTO: 156 10(3)UL (ref 150–450)
POTASSIUM SERPL-SCNC: 3.5 MMOL/L (ref 3.5–5.1)
POTASSIUM SERPL-SCNC: 3.9 MMOL/L (ref 3.5–5.1)
PROTHROMBIN TIME: 22 SECONDS (ref 11.8–14.5)
RBC # BLD AUTO: 4.23 X10(6)UL
SODIUM SERPL-SCNC: 137 MMOL/L (ref 136–145)
WBC # BLD AUTO: 5.5 X10(3) UL (ref 4–11)

## 2021-08-01 PROCEDURE — 99233 SBSQ HOSP IP/OBS HIGH 50: CPT | Performed by: INTERNAL MEDICINE

## 2021-08-01 RX ORDER — POTASSIUM CHLORIDE 20 MEQ/1
40 TABLET, EXTENDED RELEASE ORAL EVERY 4 HOURS
Status: COMPLETED | OUTPATIENT
Start: 2021-08-01 | End: 2021-08-01

## 2021-08-01 RX ORDER — METOPROLOL SUCCINATE 25 MG/1
25 TABLET, EXTENDED RELEASE ORAL
Status: DISCONTINUED | OUTPATIENT
Start: 2021-08-02 | End: 2021-08-01

## 2021-08-01 RX ORDER — METOPROLOL SUCCINATE 50 MG/1
50 TABLET, EXTENDED RELEASE ORAL
Status: DISCONTINUED | OUTPATIENT
Start: 2021-08-02 | End: 2021-08-03

## 2021-08-01 RX ORDER — FUROSEMIDE 10 MG/ML
40 INJECTION INTRAMUSCULAR; INTRAVENOUS DAILY
Status: DISCONTINUED | OUTPATIENT
Start: 2021-08-02 | End: 2021-08-01

## 2021-08-01 NOTE — PROGRESS NOTES
[facility]    Progress Note    Laurita Martínez Patient Status:  Inpatient    12/15/1950 MRN [id number]   Location [facility] 3W/SW Attending Edda Holbrook MD   Hosp Day # 2 PCP Kiana Hallman MD     CC: Cough, shortness o Thu Sat   • [START ON 8/2/2021] warfarin  2.5 mg Oral Once per day on Mon Wed Fri   • pantoprazole  40 mg Oral QAM AC   • metoprolol succinate  50 mg Oral Daily Beta Blocker   • furosemide  40 mg Intravenous BID (Diuretic)       Current PRN Inpatient Meds: When compared with ECG of 07/21/2021 10:48:01 Frequent PACS now present Electronically signed on 07/31/2021 at 13:52 by Troy Cates MD      Lab Results   Component Value Date    DDIMER 2.38 (H) 05/06/2014    PCT 0.13 07/31/2021    PCT 0.09 07/30/2021

## 2021-08-01 NOTE — PLAN OF CARE
Patient alert and oriented x 4, on room air, ambulating, voiding freely, on coumadin, fall precautions in place, call light within reach.     Problem: Patient Centered Care  Goal: Patient preferences are identified and integrated in the patient's plan of ca reports new pain  - Anticipate increased pain with activity and pre-medicate as appropriate  Outcome: Progressing     Problem: RISK FOR INFECTION - ADULT  Goal: Absence of fever/infection during anticipated neutropenic period  Description: INTERVENTIONS  - ventilation and oxygenation  Description: INTERVENTIONS:  - Assess for changes in respiratory status  - Assess for changes in mentation and behavior  - Position to facilitate oxygenation and minimize respiratory effort  - Oxygen supplementation based on ox

## 2021-08-01 NOTE — PROGRESS NOTES
[facility] Cardiology Progress Note        Yaron Brittanyviraj Patient Status:  Inpatient    12/15/1950 MRN [id number]   Location [facility] 3W/SW Attending Chip Hyman, [address] [facility] [address] Day # 2 PCP MD Jesus Angel 138 137   K 3.9 3.9 3.5    104 103   CO2 21.0 26.0 27.0   BUN 17 17 40*   CREATSERUM 1.06* 1.10* 1.70*   CA 8.8 9.2 8.8   * 96 111*       No results for input(s): ALT, AST, ALB, AMYLASE, LIPASE, LDH in the last 168 hours.     Invalid input(s):

## 2021-08-01 NOTE — PLAN OF CARE
Lasix IV push continues, monitoring BP before doses. On 1L nasal cannula, no home oxygen, slight wheezing continues. Up by self. Monitoring.        Problem: Patient/Family Goals  Goal: Patient/Family Long Term Goal  Description: Patient's Long Term Goal: to devices as appropriate  - Consider OT/PT consult to assist with strengthening/mobility  - Encourage toileting schedule  Outcome: Progressing     Problem: RESPIRATORY - ADULT  Goal: Achieves optimal ventilation and oxygenation  Description: INTERVENTIONS:

## 2021-08-02 LAB
ANION GAP SERPL CALC-SCNC: 5 MMOL/L (ref 0–18)
BASOPHILS # BLD AUTO: 0.02 X10(3) UL (ref 0–0.2)
BASOPHILS NFR BLD AUTO: 0.4 %
BUN BLD-MCNC: 41 MG/DL (ref 7–18)
BUN/CREAT SERPL: 32.3 (ref 10–20)
CALCIUM BLD-MCNC: 9.3 MG/DL (ref 8.5–10.1)
CHLORIDE SERPL-SCNC: 107 MMOL/L (ref 98–112)
CO2 SERPL-SCNC: 26 MMOL/L (ref 21–32)
CREAT BLD-MCNC: 1.27 MG/DL
DEPRECATED RDW RBC AUTO: 42.2 FL (ref 35.1–46.3)
EOSINOPHIL # BLD AUTO: 0.19 X10(3) UL (ref 0–0.7)
EOSINOPHIL NFR BLD AUTO: 4.1 %
ERYTHROCYTE [DISTWIDTH] IN BLOOD BY AUTOMATED COUNT: 13.4 % (ref 11–15)
GLUCOSE BLD-MCNC: 103 MG/DL (ref 70–99)
HCT VFR BLD AUTO: 35.7 %
HGB BLD-MCNC: 11.6 G/DL
IMM GRANULOCYTES # BLD AUTO: 0.03 X10(3) UL (ref 0–1)
IMM GRANULOCYTES NFR BLD: 0.7 %
INR BLD: 2.24 (ref 0.9–1.2)
LYMPHOCYTES # BLD AUTO: 0.93 X10(3) UL (ref 1–4)
LYMPHOCYTES NFR BLD AUTO: 20.2 %
MCH RBC QN AUTO: 28.1 PG (ref 26–34)
MCHC RBC AUTO-ENTMCNC: 32.5 G/DL (ref 31–37)
MCV RBC AUTO: 86.4 FL
MONOCYTES # BLD AUTO: 0.55 X10(3) UL (ref 0.1–1)
MONOCYTES NFR BLD AUTO: 11.9 %
NEUTROPHILS # BLD AUTO: 2.89 X10 (3) UL (ref 1.5–7.7)
NEUTROPHILS # BLD AUTO: 2.89 X10(3) UL (ref 1.5–7.7)
NEUTROPHILS NFR BLD AUTO: 62.7 %
OSMOLALITY SERPL CALC.SUM OF ELEC: 296 MOSM/KG (ref 275–295)
PLATELET # BLD AUTO: 165 10(3)UL (ref 150–450)
POTASSIUM SERPL-SCNC: 5.2 MMOL/L (ref 3.5–5.1)
PROTHROMBIN TIME: 24.4 SECONDS (ref 11.8–14.5)
RBC # BLD AUTO: 4.13 X10(6)UL
SODIUM SERPL-SCNC: 138 MMOL/L (ref 136–145)
WBC # BLD AUTO: 4.6 X10(3) UL (ref 4–11)

## 2021-08-02 PROCEDURE — 99233 SBSQ HOSP IP/OBS HIGH 50: CPT | Performed by: INTERNAL MEDICINE

## 2021-08-02 RX ORDER — FUROSEMIDE 20 MG/1
20 TABLET ORAL DAILY
Status: DISCONTINUED | OUTPATIENT
Start: 2021-08-02 | End: 2021-08-03

## 2021-08-02 NOTE — PLAN OF CARE
Patient remains in NSR, not afib overnight tonight. All diuretics DC'd yesterday with raise in BUN/creatinine, will reassess today per cardiology. PT/OT consult ordered as patient told MD she felt her legs were \"weaker\".  No complaints overnight, up watch PLANNING  Goal: Discharge to home or other facility with appropriate resources  Description: INTERVENTIONS:  - Identify barriers to discharge w/pt and caregiver  - Include patient/family/discharge partner in discharge planning  - Arrange for needed dischar stability  - Monitor arterial and/or venous puncture sites for bleeding and/or hematoma  - Assess quality of pulses, skin color and temperature  - Assess for signs of decreased coronary artery perfusion - ex.  Angina  - Evaluate fluid balance, assess for ed

## 2021-08-02 NOTE — PHYSICAL THERAPY NOTE
PHYSICAL THERAPY EVALUATION - INPATIENT     Room Number: 345/345-A  Evaluation Date: 8/2/2021  Type of Evaluation: Initial   Physician Order: PT Eval and Treat    Presenting Problem: CHF  Reason for Therapy: Mobility Dysfunction and Discharge Planning session.     DISCHARGE RECOMMENDATIONS  PT Discharge Recommendations: 24 hour care/supervision;Home    PHYSICAL THERAPY MEDICAL/SOCIAL HISTORY   Problem List  Principal Problem:    Acute congestive heart failure, unspecified heart failure type ([address]  Active within functional limits BLE WNL    BALANCE  Static Sitting: Good  Dynamic Sitting: Good  Static Standing: Good  Dynamic Standing: Fair +    AM-PAC '6-Clicks' INPATIENT SHORT FORM - BASIC MOBILITY  How much difficulty does the patient currently have. ..  -

## 2021-08-02 NOTE — PROGRESS NOTES
ASSESSMENT/PLAN:     1. SOB/cough/low grade fever; better today with excellent diuresis  2. Acute diastolic CHF  3. moderate septal hypertrophy with dynamic subaortic outflow obstruction - HCM? 4. moderate MR  5. moderate pulmonary HTN   6.  Afib on coum 1.00        Years: 15.00        Pack years: 13        Quit date: 1981        Years since quittin.5      Smokeless tobacco: Former User        Quit date: 2001    Vaping Use      Vaping Use: Never used    Alcohol use: Yes      Comment: rare

## 2021-08-02 NOTE — PROGRESS NOTES
[facility]    Progress Note    Whit Alonso Patient Status:  Inpatient    12/15/1950 MRN [id number]   Location [facility] 3W/SW Attending Vladimir aBhena MD   Hosp Day # 3 PCP Ely Mix MD     CC: Cough, shortness o Units Oral Daily   • warfarin  5 mg Oral Once per day on Sun Tue Thu Sat   • warfarin  2.5 mg Oral Once per day on Mon Wed Fri   • pantoprazole  40 mg Oral [facility]       Current PRN Inpatient Meds:      ondansetron, acetaminophen, hydrALAzine HCl, HYDROcodo failure with preserved ejection fraction   -BNP greater than 12,000  - Last known EF 65-70% 7/26/21  -Creatinine downtrending  -Will restart low dose PO diuresis   - Restart ACEi when able  -Continue statin and BB.   - Strict I&Os, Daily weights, Fluid res

## 2021-08-02 NOTE — PLAN OF CARE
Joan Matthews, RA, up independently, no complaints of pain, started on PO lasix today, pt/ot plan to see today.        Problem: Patient Centered Care  Goal: Patient preferences are identified and integrated in the patient's plan of care  Description: Makenna Mathew increased pain with activity and pre-medicate as appropriate  Outcome: Progressing     Problem: RISK FOR INFECTION - ADULT  Goal: Absence of fever/infection during anticipated neutropenic period  Description: INTERVENTIONS  - Monitor WBC  - Administer grow oxygenation  Description: INTERVENTIONS:  - Assess for changes in respiratory status  - Assess for changes in mentation and behavior  - Position to facilitate oxygenation and minimize respiratory effort  - Oxygen supplementation based on oxygen saturation

## 2021-08-02 NOTE — OCCUPATIONAL THERAPY NOTE
OCCUPATIONAL THERAPY EVALUATION - INPATIENT     Room Number: 345/345-A  Evaluation Date: 8/2/2021  Type of Evaluation: Initial and Quick Eval  Presenting Problem: fever and cough    Physician Order: IP Consult to Occupational Therapy  Reason for Therapy: with no skilled Occupational Therapy needs  at this time. Patient will be discharged from Occupational Therapy services. Please re-order if a new functional limitation presents during this admission.     OCCUPATIONAL THERAPY MEDICAL/SOCIAL HISTORY     Prob Rest: 96    COGNITION  Overall Cognitive Status:  WFL - within functional limits  Arousal/Alertness:  appropriate responses to stimuli  Following Commands:  follows all commands and directions without difficulty      RANGE OF MOTION   Upper extremity ROM i

## 2021-08-03 VITALS
HEIGHT: 64.5 IN | WEIGHT: 143.19 LBS | DIASTOLIC BLOOD PRESSURE: 51 MMHG | BODY MASS INDEX: 24.15 KG/M2 | HEART RATE: 70 BPM | SYSTOLIC BLOOD PRESSURE: 115 MMHG | OXYGEN SATURATION: 93 % | RESPIRATION RATE: 16 BRPM | TEMPERATURE: 98 F

## 2021-08-03 LAB
ANION GAP SERPL CALC-SCNC: 11 MMOL/L (ref 0–18)
BASOPHILS # BLD AUTO: 0.03 X10(3) UL (ref 0–0.2)
BASOPHILS NFR BLD AUTO: 0.6 %
BUN BLD-MCNC: 32 MG/DL (ref 7–18)
BUN/CREAT SERPL: 31.1 (ref 10–20)
CALCIUM BLD-MCNC: 9 MG/DL (ref 8.5–10.1)
CHLORIDE SERPL-SCNC: 105 MMOL/L (ref 98–112)
CO2 SERPL-SCNC: 25 MMOL/L (ref 21–32)
CREAT BLD-MCNC: 1.03 MG/DL
DEPRECATED RDW RBC AUTO: 42.4 FL (ref 35.1–46.3)
EOSINOPHIL # BLD AUTO: 0.25 X10(3) UL (ref 0–0.7)
EOSINOPHIL NFR BLD AUTO: 5.3 %
ERYTHROCYTE [DISTWIDTH] IN BLOOD BY AUTOMATED COUNT: 13.4 % (ref 11–15)
GLUCOSE BLD-MCNC: 98 MG/DL (ref 70–99)
HCT VFR BLD AUTO: 34.7 %
HGB BLD-MCNC: 11.3 G/DL
IMM GRANULOCYTES # BLD AUTO: 0.04 X10(3) UL (ref 0–1)
IMM GRANULOCYTES NFR BLD: 0.9 %
INR BLD: 2.48 (ref 0.9–1.2)
LYMPHOCYTES # BLD AUTO: 0.99 X10(3) UL (ref 1–4)
LYMPHOCYTES NFR BLD AUTO: 21.1 %
MCH RBC QN AUTO: 28 PG (ref 26–34)
MCHC RBC AUTO-ENTMCNC: 32.6 G/DL (ref 31–37)
MCV RBC AUTO: 86.1 FL
MONOCYTES # BLD AUTO: 0.49 X10(3) UL (ref 0.1–1)
MONOCYTES NFR BLD AUTO: 10.4 %
NEUTROPHILS # BLD AUTO: 2.89 X10 (3) UL (ref 1.5–7.7)
NEUTROPHILS # BLD AUTO: 2.89 X10(3) UL (ref 1.5–7.7)
NEUTROPHILS NFR BLD AUTO: 61.7 %
OSMOLALITY SERPL CALC.SUM OF ELEC: 299 MOSM/KG (ref 275–295)
PLATELET # BLD AUTO: 167 10(3)UL (ref 150–450)
POTASSIUM SERPL-SCNC: 4.3 MMOL/L (ref 3.5–5.1)
PROTHROMBIN TIME: 26.4 SECONDS (ref 11.8–14.5)
RBC # BLD AUTO: 4.03 X10(6)UL
SODIUM SERPL-SCNC: 141 MMOL/L (ref 136–145)
WBC # BLD AUTO: 4.7 X10(3) UL (ref 4–11)

## 2021-08-03 PROCEDURE — 99239 HOSP IP/OBS DSCHRG MGMT >30: CPT | Performed by: INTERNAL MEDICINE

## 2021-08-03 RX ORDER — PANTOPRAZOLE SODIUM 40 MG/1
40 TABLET, DELAYED RELEASE ORAL
Qty: 14 TABLET | Refills: 0 | Status: SHIPPED | OUTPATIENT
Start: 2021-08-04 | End: 2021-08-18

## 2021-08-03 RX ORDER — ENALAPRIL MALEATE 10 MG/1
10 TABLET ORAL 2 TIMES DAILY
Qty: 60 TABLET | Refills: 0 | Status: SHIPPED | OUTPATIENT
Start: 2021-08-03 | End: 2021-08-17

## 2021-08-03 RX ORDER — FUROSEMIDE 20 MG/1
20 TABLET ORAL DAILY
Qty: 30 TABLET | Refills: 0 | Status: SHIPPED | OUTPATIENT
Start: 2021-08-04 | End: 2021-08-06

## 2021-08-03 NOTE — PROGRESS NOTES
ASSESSMENT/PLAN:     1. SOB/cough/low grade fever; better today with excellent diuresis  2. Acute diastolic CHF  3. moderate septal hypertrophy with dynamic subaortic outflow obstruction - HCM? 4. moderate MR  5. moderate pulmonary HTN   6.  Afib on coum Smoking status: Former Smoker        Packs/day: 1.00        Years: 15.00        Pack years: 13        Quit date: 1981        Years since quittin.5      Smokeless tobacco: Former User        Quit date: 2001    Vaping Use      Vaping Use: Storm Lujan

## 2021-08-03 NOTE — PLAN OF CARE
Toña is alert and oriented. Denies chest pain/SOB. Continue to diurese patient with PO Lasix. Plan for home upon discharge.      Problem: Patient Centered Care  Goal: Patient preferences are identified and integrated in the patient's plan of care  Desc pain  - Anticipate increased pain with activity and pre-medicate as appropriate  Outcome: Progressing     Problem: SAFETY ADULT - FALL  Goal: Free from fall injury  Description: INTERVENTIONS:  - Assess pt frequently for physical needs  - Identify cognitiv

## 2021-08-03 NOTE — DISCHARGE PLANNING
Patient was provided with discharge instructions, education, and follow up information; patient's  Sheryl Fleischer was also present for instructions with patient's consent. Prescriptions were already sent electronically to patient's pharmacy.  Patient verbalizes

## 2021-08-03 NOTE — PLAN OF CARE
Patient is a/ox4. RA. Self care. Fluid restriction. PO lasix. No complaints of pain. Plan is to go home today. Will continue to monitor.     Problem: Patient Centered Care  Goal: Patient preferences are identified and integrated in the patient's plan of car interventions unsuccessful or patient reports new pain  - Anticipate increased pain with activity and pre-medicate as appropriate  Outcome: Adequate for Discharge     Problem: RISK FOR INFECTION - ADULT  Goal: Absence of fever/infection during anticipated system  Outcome: Adequate for Discharge     Problem: RESPIRATORY - ADULT  Goal: Achieves optimal ventilation and oxygenation  Description: INTERVENTIONS:  - Assess for changes in respiratory status  - Assess for changes in mentation and behavior  - Positio

## 2021-08-03 NOTE — DISCHARGE SUMMARY
[facility]    Discharge Summary    Avila Mccloud Patient Status:  Inpatient    12/15/1950 MRN [id number]   Location [facility] 3W/SW Attending Oh Aguilar, [address] [facility] [address] Day # 4 PCP Kristal Sanchez MD     Date of Admission: chills.     Review of Systems:   Other 10 point ROS was asked and was negative    Physical Exam:     08/02/21 2015 08/03/21  0412 08/03/21  0614 08/03/21  0812   BP: 128/63  116/60 115/51   BP Location: Right arm  Right arm Right arm   Pulse: 61  62 70   R 1.03 (H) 08/03/2021    BUN 32 (H) 08/03/2021     08/03/2021    K 4.3 08/03/2021     08/03/2021    CO2 25.0 08/03/2021    GLU 98 08/03/2021    CA 9.0 08/03/2021    ALB 3.8 04/15/2021    ALKPHO 86 04/15/2021    BILT 0.5 04/15/2021    TP 8.0 04/15 0        CHANGE how you take these medications      Instructions Prescription details   metoprolol succinate 50 MG Tb24  Commonly known as:  Toprol XL  What changed:   · how much to take  · how to take this  · when to take this      TAKE 1 TABLET BY MOUTH E Consulting Physician  HOSPITALIST            Other Discharge Instructions:       ----------------------------------------------------  Via Maribel Araujo MD    8/3/2021  12:07 PM

## 2021-08-03 NOTE — CM/SW NOTE
BPCI-Advanced Medicare Program Note:  Plan of care reviewed for care coordination and discharge planning. Noted patient falls under  BPCI/Medicare program, with  for CHF. LATRELL tool was used to help determine next care setting.  Thus, [address] recommend

## 2021-08-03 NOTE — CARDIAC REHAB
CARDIAC REHAB HEART FAILURE EDUCATION    Handouts provided and reviewed: CHF Booklet. Activity: Chair for all meals: yes       Ambulation: yes       Tolerated Activity: well         Disease Process: Disease process reviewed with patient and .

## 2021-08-04 ENCOUNTER — PATIENT OUTREACH (OUTPATIENT)
Dept: CASE MANAGEMENT | Age: 71
End: 2021-08-04

## 2021-08-04 ENCOUNTER — TELEPHONE (OUTPATIENT)
Dept: INTERNAL MEDICINE UNIT | Facility: HOSPITAL | Age: 71
End: 2021-08-04

## 2021-08-04 DIAGNOSIS — Z02.9 ENCOUNTERS FOR ADMINISTRATIVE PURPOSE: ICD-10-CM

## 2021-08-04 PROCEDURE — 1111F DSCHRG MED/CURRENT MED MERGE: CPT

## 2021-08-04 NOTE — TELEPHONE ENCOUNTER
Patient called per Dr Keith Orta to advise she needs to have Coumadin Clinic follow up in 1-2 weeks.  Pt states she follows [facility] in [place] for outpatient anticoagulation management and will contact the office for a sooner follow up

## 2021-08-04 NOTE — PROGRESS NOTES
Patient called requesting assistance       Tiburcio Glover  [address] [place] [address]  [phone]  Apt scheduled for Friday August 6th @2:40      Maite Morocho MD  [address] [id number]

## 2021-08-04 NOTE — PROGRESS NOTES
Initial Post Discharge Follow Up   Discharge Date: 8/3/21  Contact Date: 8/4/2021    Consent Verification:  Assessment Completed With: Patient  HIPAA Verified?   Yes    Discharge Dx:   Acute congestive heart failure, unspecified heart failure type      G mouth every evening. 90 tablet 3   • Calcium Carbonate (CALCIUM 500 OR) Take 1 tablet by mouth daily as needed. • Cholecalciferol (VITAMIN D3) 1000 UNITS Oral Cap Take 1 tablet by mouth daily.      • Vitamin B-12 (VITAMIN B12) 1000 MCG Oral Tab Take 1 is monitoring PT/INR?  ([facility], HH, PCP, cardiology, etc) [facility]  NCM Reviewed next PT/INR appointment with pt:  Yes  Interventions:          Needs post D/C:   Now that you are home, are there any needs or concerns you need address talcum powder, lotions, or creams on your breasts or underarms.  They leave a coating that may be picked up by the x-rays, thereby distorting the mammogram.    Wear a two piece outfit the day of the exam. This allows you to be more comfortable during the ex [address] [place] [address]  [phone]          PCP TCM/HFU appointment: scheduled at D/C within 7-14 days  no     NCM Reviewed/scheduled/rescheduled PCP TCM/HFU appointment with pt:  Yes      Have you made all of your follow up appointments?  yes

## 2021-08-06 ENCOUNTER — OFFICE VISIT (OUTPATIENT)
Dept: INTERNAL MEDICINE CLINIC | Facility: CLINIC | Age: 71
End: 2021-08-06
Payer: MEDICARE

## 2021-08-06 VITALS
BODY MASS INDEX: 23.95 KG/M2 | OXYGEN SATURATION: 97 % | DIASTOLIC BLOOD PRESSURE: 65 MMHG | WEIGHT: 142 LBS | HEIGHT: 64.5 IN | TEMPERATURE: 98 F | HEART RATE: 70 BPM | SYSTOLIC BLOOD PRESSURE: 114 MMHG

## 2021-08-06 DIAGNOSIS — I10 HYPERTENSION, BENIGN: Primary | ICD-10-CM

## 2021-08-06 DIAGNOSIS — Z79.01 LONG TERM (CURRENT) USE OF ANTICOAGULANTS: ICD-10-CM

## 2021-08-06 DIAGNOSIS — I27.20 PULMONARY HTN (HCC): ICD-10-CM

## 2021-08-06 PROCEDURE — 99496 TRANSJ CARE MGMT HIGH F2F 7D: CPT | Performed by: INTERNAL MEDICINE

## 2021-08-06 RX ORDER — FUROSEMIDE 20 MG/1
20 TABLET ORAL DAILY
Qty: 30 TABLET | Refills: 0 | Status: SHIPPED | OUTPATIENT
Start: 2021-08-06 | End: 2021-08-24

## 2021-08-18 PROBLEM — R00.2 PALPITATIONS: Status: RESOLVED | Noted: 2021-06-18 | Resolved: 2021-08-18

## 2021-08-18 PROBLEM — I50.9 ACUTE CONGESTIVE HEART FAILURE (HCC): Status: RESOLVED | Noted: 2021-07-30 | Resolved: 2021-08-18

## 2021-08-18 NOTE — PROGRESS NOTES
HPI:    Esther Palencia is a 79year old female here today for hospital follow up.    She was discharged from Inpatient hospital, [facility]  to Home   Admission Date: 7/30/21   Discharge Date: 8/3/21  DISCHARGE DX: Principal Problem:    Acute murray for follow-up on treatments  ? specialists already aware of assumption/resumption of care  ? Education given to patient on self-management, independent living, and activities of daily living. ?  Referrals as listed below in orders Assisted in scheduling re (7/31/2018), Murmur (7/31/2018), Unspecified essential hypertension, and Vertigo.     She  has a past surgical history that includes electrocardiogram, complete (05-); [facility] localization wire 1 site left (cpt=19281) (1998); [facility] localization wire 1 site effort is normal.      Breath sounds: Normal breath sounds. Abdominal:      General: Bowel sounds are normal.      Palpations: Abdomen is soft. Skin:     General: Skin is warm and dry. Neurological:      Mental Status: She is alert.        ASSESSMENT/ severe    Overall Risk:   severe    Patient seen within 7 days from date of discharge.      Jennifer Guerra MD, 8/18/2021

## 2021-08-19 ENCOUNTER — LAB ENCOUNTER (OUTPATIENT)
Dept: LAB | Age: 71
End: 2021-08-19
Attending: INTERNAL MEDICINE
Payer: MEDICARE

## 2021-08-19 DIAGNOSIS — I10 HYPERTENSION, BENIGN: ICD-10-CM

## 2021-08-19 LAB
ANION GAP SERPL CALC-SCNC: 7 MMOL/L (ref 0–18)
BUN BLD-MCNC: 37 MG/DL (ref 7–18)
BUN/CREAT SERPL: 27.8 (ref 10–20)
CALCIUM BLD-MCNC: 9.3 MG/DL (ref 8.5–10.1)
CHLORIDE SERPL-SCNC: 109 MMOL/L (ref 98–112)
CO2 SERPL-SCNC: 25 MMOL/L (ref 21–32)
CREAT BLD-MCNC: 1.33 MG/DL
GLUCOSE BLD-MCNC: 96 MG/DL (ref 70–99)
OSMOLALITY SERPL CALC.SUM OF ELEC: 301 MOSM/KG (ref 275–295)
PATIENT FASTING Y/N/NP: YES
POTASSIUM SERPL-SCNC: 5.2 MMOL/L (ref 3.5–5.1)
SODIUM SERPL-SCNC: 141 MMOL/L (ref 136–145)

## 2021-08-19 PROCEDURE — 80048 BASIC METABOLIC PNL TOTAL CA: CPT

## 2021-08-19 PROCEDURE — 36415 COLL VENOUS BLD VENIPUNCTURE: CPT

## 2021-08-23 ENCOUNTER — HOSPITAL ENCOUNTER (OUTPATIENT)
Dept: MAMMOGRAPHY | Facility: HOSPITAL | Age: 71
Discharge: HOME | End: 2021-08-23
Attending: INTERNAL MEDICINE
Payer: MEDICARE

## 2021-08-23 ENCOUNTER — HOSPITAL ENCOUNTER (OUTPATIENT)
Dept: BONE DENSITY | Facility: HOSPITAL | Age: 71
Discharge: HOME | End: 2021-08-23
Attending: INTERNAL MEDICINE
Payer: MEDICARE

## 2021-08-23 DIAGNOSIS — Z12.31 VISIT FOR SCREENING MAMMOGRAM: ICD-10-CM

## 2021-08-23 DIAGNOSIS — Z78.0 POSTMENOPAUSAL: ICD-10-CM

## 2021-08-23 PROCEDURE — 77080 DXA BONE DENSITY AXIAL: CPT | Performed by: INTERNAL MEDICINE

## 2021-08-23 PROCEDURE — 77067 SCR MAMMO BI INCL CAD: CPT | Performed by: INTERNAL MEDICINE

## 2021-08-23 PROCEDURE — 77063 BREAST TOMOSYNTHESIS BI: CPT | Performed by: INTERNAL MEDICINE

## 2021-08-24 PROBLEM — I08.0 MITRAL VALVE INSUFFICIENCY AND AORTIC VALVE INSUFFICIENCY: Status: ACTIVE | Noted: 2021-08-24

## 2021-08-24 PROBLEM — I48.0 PAROXYSMAL ATRIAL FIBRILLATION (HCC): Status: ACTIVE | Noted: 2021-08-24

## 2021-08-24 PROBLEM — I50.33 ACUTE ON CHRONIC HEART FAILURE WITH PRESERVED EJECTION FRACTION (HCC): Status: ACTIVE | Noted: 2021-08-24

## 2021-09-03 ENCOUNTER — LAB ENCOUNTER (OUTPATIENT)
Dept: LAB | Age: 71
End: 2021-09-03
Attending: INTERNAL MEDICINE
Payer: MEDICARE

## 2021-09-03 DIAGNOSIS — I10 HYPERTENSION, BENIGN: Primary | ICD-10-CM

## 2021-09-03 DIAGNOSIS — I10 HYPERTENSION, BENIGN: ICD-10-CM

## 2021-09-03 LAB
ANION GAP SERPL CALC-SCNC: 5 MMOL/L (ref 0–18)
BUN BLD-MCNC: 29 MG/DL (ref 7–18)
BUN/CREAT SERPL: 23 (ref 10–20)
CALCIUM BLD-MCNC: 9.8 MG/DL (ref 8.5–10.1)
CHLORIDE SERPL-SCNC: 109 MMOL/L (ref 98–112)
CO2 SERPL-SCNC: 26 MMOL/L (ref 21–32)
CREAT BLD-MCNC: 1.26 MG/DL
GLUCOSE BLD-MCNC: 90 MG/DL (ref 70–99)
OSMOLALITY SERPL CALC.SUM OF ELEC: 295 MOSM/KG (ref 275–295)
PATIENT FASTING Y/N/NP: YES
POTASSIUM SERPL-SCNC: 5 MMOL/L (ref 3.5–5.1)
SODIUM SERPL-SCNC: 140 MMOL/L (ref 136–145)

## 2021-09-03 PROCEDURE — 80048 BASIC METABOLIC PNL TOTAL CA: CPT

## 2021-09-03 PROCEDURE — 36415 COLL VENOUS BLD VENIPUNCTURE: CPT

## 2021-10-14 ENCOUNTER — LAB ENCOUNTER (OUTPATIENT)
Dept: LAB | Age: 71
End: 2021-10-14
Attending: NURSE PRACTITIONER
Payer: MEDICARE

## 2021-10-14 DIAGNOSIS — I27.20 PULMONARY HTN (HCC): ICD-10-CM

## 2021-10-14 DIAGNOSIS — I50.33 ACUTE ON CHRONIC HEART FAILURE WITH PRESERVED EJECTION FRACTION (HCC): ICD-10-CM

## 2021-10-14 PROCEDURE — 36415 COLL VENOUS BLD VENIPUNCTURE: CPT

## 2021-10-14 PROCEDURE — 83880 ASSAY OF NATRIURETIC PEPTIDE: CPT

## 2021-10-14 PROCEDURE — 80048 BASIC METABOLIC PNL TOTAL CA: CPT

## 2021-10-14 NOTE — PROGRESS NOTES
Future Appointments  10/19/2021 11:30 AM   DOMINIK Conn         [facility]            [facility]

## 2022-04-19 ENCOUNTER — HOSPITAL ENCOUNTER (OUTPATIENT)
Dept: GENERAL RADIOLOGY | Age: 72
Discharge: HOME | End: 2022-04-19
Attending: INTERNAL MEDICINE
Payer: MEDICARE

## 2022-04-19 ENCOUNTER — OFFICE VISIT (OUTPATIENT)
Dept: INTERNAL MEDICINE CLINIC | Facility: CLINIC | Age: 72
End: 2022-04-19
Payer: MEDICARE

## 2022-04-19 ENCOUNTER — LAB ENCOUNTER (OUTPATIENT)
Dept: LAB | Age: 72
End: 2022-04-19
Attending: INTERNAL MEDICINE
Payer: MEDICARE

## 2022-04-19 VITALS
HEIGHT: 64 IN | SYSTOLIC BLOOD PRESSURE: 126 MMHG | RESPIRATION RATE: 16 BRPM | BODY MASS INDEX: 24.59 KG/M2 | WEIGHT: 144 LBS | HEART RATE: 66 BPM | DIASTOLIC BLOOD PRESSURE: 72 MMHG

## 2022-04-19 DIAGNOSIS — I10 HYPERTENSION, BENIGN: ICD-10-CM

## 2022-04-19 DIAGNOSIS — M25.552 HIP PAIN, LEFT: ICD-10-CM

## 2022-04-19 DIAGNOSIS — I27.20 PULMONARY HTN (HCC): ICD-10-CM

## 2022-04-19 DIAGNOSIS — Z00.00 ENCOUNTER FOR ANNUAL HEALTH EXAMINATION: ICD-10-CM

## 2022-04-19 DIAGNOSIS — I50.9 CHRONIC CONGESTIVE HEART FAILURE, UNSPECIFIED HEART FAILURE TYPE (HCC): ICD-10-CM

## 2022-04-19 DIAGNOSIS — I34.0 NONRHEUMATIC MITRAL VALVE REGURGITATION: ICD-10-CM

## 2022-04-19 DIAGNOSIS — I48.20 ATRIAL FIBRILLATION, CHRONIC (HCC): ICD-10-CM

## 2022-04-19 DIAGNOSIS — R09.89 BILATERAL CAROTID BRUITS: ICD-10-CM

## 2022-04-19 DIAGNOSIS — E78.5 HYPERLIPIDEMIA, UNSPECIFIED HYPERLIPIDEMIA TYPE: ICD-10-CM

## 2022-04-19 DIAGNOSIS — I35.1 NONRHEUMATIC AORTIC VALVE INSUFFICIENCY: ICD-10-CM

## 2022-04-19 DIAGNOSIS — Z12.31 VISIT FOR SCREENING MAMMOGRAM: ICD-10-CM

## 2022-04-19 DIAGNOSIS — Z00.00 MEDICARE ANNUAL WELLNESS VISIT, SUBSEQUENT: Primary | ICD-10-CM

## 2022-04-19 DIAGNOSIS — Z79.01 LONG TERM (CURRENT) USE OF ANTICOAGULANTS: ICD-10-CM

## 2022-04-19 PROBLEM — I08.0 MITRAL VALVE INSUFFICIENCY AND AORTIC VALVE INSUFFICIENCY: Status: RESOLVED | Noted: 2021-08-24 | Resolved: 2022-04-19

## 2022-04-19 PROBLEM — I48.0 PAROXYSMAL ATRIAL FIBRILLATION (HCC): Status: RESOLVED | Noted: 2021-08-24 | Resolved: 2022-04-19

## 2022-04-19 LAB
ALBUMIN SERPL-MCNC: 3.9 G/DL (ref 3.4–5)
ALBUMIN/GLOB SERPL: 1 {RATIO} (ref 1–2)
ALP LIVER SERPL-CCNC: 94 U/L
ALT SERPL-CCNC: 22 U/L
ANION GAP SERPL CALC-SCNC: 9 MMOL/L (ref 0–18)
AST SERPL-CCNC: 19 U/L (ref 15–37)
BILIRUB SERPL-MCNC: 0.7 MG/DL (ref 0.1–2)
BILIRUB UR QL: NEGATIVE
BUN BLD-MCNC: 28 MG/DL (ref 7–18)
BUN/CREAT SERPL: 23.9 (ref 10–20)
CALCIUM BLD-MCNC: 9.5 MG/DL (ref 8.5–10.1)
CHLORIDE SERPL-SCNC: 108 MMOL/L (ref 98–112)
CHOLEST SERPL-MCNC: 173 MG/DL (ref ?–200)
CLARITY UR: CLEAR
CO2 SERPL-SCNC: 26 MMOL/L (ref 21–32)
COLOR UR: YELLOW
CREAT BLD-MCNC: 1.17 MG/DL
DEPRECATED RDW RBC AUTO: 43.5 FL (ref 35.1–46.3)
ERYTHROCYTE [DISTWIDTH] IN BLOOD BY AUTOMATED COUNT: 13 % (ref 11–15)
FASTING PATIENT LIPID ANSWER: YES
FASTING STATUS PATIENT QL REPORTED: YES
GLOBULIN PLAS-MCNC: 3.9 G/DL (ref 2.8–4.4)
GLUCOSE BLD-MCNC: 99 MG/DL (ref 70–99)
GLUCOSE UR-MCNC: NEGATIVE MG/DL
HCT VFR BLD AUTO: 41.1 %
HDLC SERPL-MCNC: 52 MG/DL (ref 40–59)
HGB BLD-MCNC: 12.9 G/DL
HGB UR QL STRIP.AUTO: NEGATIVE
KETONES UR-MCNC: NEGATIVE MG/DL
LDLC SERPL CALC-MCNC: 102 MG/DL (ref ?–100)
LEUKOCYTE ESTERASE UR QL STRIP.AUTO: NEGATIVE
MCH RBC QN AUTO: 28.5 PG (ref 26–34)
MCHC RBC AUTO-ENTMCNC: 31.4 G/DL (ref 31–37)
MCV RBC AUTO: 90.9 FL
NITRITE UR QL STRIP.AUTO: NEGATIVE
NONHDLC SERPL-MCNC: 121 MG/DL (ref ?–130)
OSMOLALITY SERPL CALC.SUM OF ELEC: 302 MOSM/KG (ref 275–295)
PH UR: 5 [PH] (ref 5–8)
PLATELET # BLD AUTO: 176 10(3)UL (ref 150–450)
POTASSIUM SERPL-SCNC: 4.4 MMOL/L (ref 3.5–5.1)
PROT SERPL-MCNC: 7.8 G/DL (ref 6.4–8.2)
PROT UR-MCNC: NEGATIVE MG/DL
RBC # BLD AUTO: 4.52 X10(6)UL
SODIUM SERPL-SCNC: 143 MMOL/L (ref 136–145)
SP GR UR STRIP: 1.02 (ref 1–1.03)
T4 FREE SERPL-MCNC: 1.1 NG/DL (ref 0.8–1.7)
TRIGL SERPL-MCNC: 105 MG/DL (ref 30–149)
TSI SER-ACNC: 2.03 MIU/ML (ref 0.36–3.74)
UROBILINOGEN UR STRIP-ACNC: <2
VIT C UR-MCNC: NEGATIVE MG/DL
VLDLC SERPL CALC-MCNC: 18 MG/DL (ref 0–30)
WBC # BLD AUTO: 5.5 X10(3) UL (ref 4–11)

## 2022-04-19 PROCEDURE — 99213 OFFICE O/P EST LOW 20 MIN: CPT | Performed by: INTERNAL MEDICINE

## 2022-04-19 PROCEDURE — 84439 ASSAY OF FREE THYROXINE: CPT

## 2022-04-19 PROCEDURE — 85027 COMPLETE CBC AUTOMATED: CPT

## 2022-04-19 PROCEDURE — 80061 LIPID PANEL: CPT

## 2022-04-19 PROCEDURE — 84443 ASSAY THYROID STIM HORMONE: CPT

## 2022-04-19 PROCEDURE — G0439 PPPS, SUBSEQ VISIT: HCPCS | Performed by: INTERNAL MEDICINE

## 2022-04-19 PROCEDURE — 81003 URINALYSIS AUTO W/O SCOPE: CPT

## 2022-04-19 PROCEDURE — 73502 X-RAY EXAM HIP UNI 2-3 VIEWS: CPT | Performed by: INTERNAL MEDICINE

## 2022-04-19 PROCEDURE — 71046 X-RAY EXAM CHEST 2 VIEWS: CPT | Performed by: INTERNAL MEDICINE

## 2022-04-19 PROCEDURE — 36415 COLL VENOUS BLD VENIPUNCTURE: CPT

## 2022-04-19 PROCEDURE — 80053 COMPREHEN METABOLIC PANEL: CPT

## 2022-05-17 ENCOUNTER — HOSPITAL ENCOUNTER (OUTPATIENT)
Dept: ULTRASOUND IMAGING | Age: 72
Discharge: HOME | End: 2022-05-17
Attending: INTERNAL MEDICINE
Payer: MEDICARE

## 2022-05-17 DIAGNOSIS — R09.89 BILATERAL CAROTID BRUITS: ICD-10-CM

## 2022-05-17 DIAGNOSIS — I48.20 ATRIAL FIBRILLATION, CHRONIC (HCC): ICD-10-CM

## 2022-05-17 PROCEDURE — 93880 EXTRACRANIAL BILAT STUDY: CPT | Performed by: INTERNAL MEDICINE

## 2022-08-26 ENCOUNTER — HOSPITAL ENCOUNTER (OUTPATIENT)
Dept: MAMMOGRAPHY | Facility: HOSPITAL | Age: 72
Discharge: HOME | End: 2022-08-26
Attending: INTERNAL MEDICINE
Payer: MEDICARE

## 2022-08-26 DIAGNOSIS — Z12.31 VISIT FOR SCREENING MAMMOGRAM: ICD-10-CM

## 2022-08-26 PROCEDURE — 77063 BREAST TOMOSYNTHESIS BI: CPT | Performed by: INTERNAL MEDICINE

## 2022-08-26 PROCEDURE — 77067 SCR MAMMO BI INCL CAD: CPT | Performed by: INTERNAL MEDICINE

## 2023-02-23 LAB — AMB EXT COVID-19 RESULT: DETECTED

## 2023-02-24 ENCOUNTER — NURSE TRIAGE (OUTPATIENT)
Dept: INTERNAL MEDICINE CLINIC | Facility: CLINIC | Age: 73
End: 2023-02-24

## 2023-04-21 ENCOUNTER — OFFICE VISIT (OUTPATIENT)
Dept: INTERNAL MEDICINE CLINIC | Facility: CLINIC | Age: 73
End: 2023-04-21

## 2023-04-21 ENCOUNTER — LAB ENCOUNTER (OUTPATIENT)
Dept: LAB | Age: 73
End: 2023-04-21
Attending: INTERNAL MEDICINE
Payer: MEDICARE

## 2023-04-21 VITALS
HEART RATE: 57 BPM | DIASTOLIC BLOOD PRESSURE: 66 MMHG | HEIGHT: 64 IN | OXYGEN SATURATION: 99 % | WEIGHT: 138 LBS | SYSTOLIC BLOOD PRESSURE: 132 MMHG | BODY MASS INDEX: 23.56 KG/M2

## 2023-04-21 DIAGNOSIS — Z00.00 MEDICARE ANNUAL WELLNESS VISIT, SUBSEQUENT: Primary | ICD-10-CM

## 2023-04-21 DIAGNOSIS — I10 HYPERTENSION, BENIGN: ICD-10-CM

## 2023-04-21 DIAGNOSIS — Z12.31 VISIT FOR SCREENING MAMMOGRAM: ICD-10-CM

## 2023-04-21 DIAGNOSIS — I35.1 NONRHEUMATIC AORTIC VALVE INSUFFICIENCY: ICD-10-CM

## 2023-04-21 DIAGNOSIS — Z00.00 ENCOUNTER FOR ANNUAL HEALTH EXAMINATION: ICD-10-CM

## 2023-04-21 DIAGNOSIS — Z78.0 POSTMENOPAUSAL: ICD-10-CM

## 2023-04-21 DIAGNOSIS — E78.5 HYPERLIPIDEMIA, UNSPECIFIED HYPERLIPIDEMIA TYPE: ICD-10-CM

## 2023-04-21 DIAGNOSIS — I50.33 ACUTE ON CHRONIC HEART FAILURE WITH PRESERVED EJECTION FRACTION (HCC): ICD-10-CM

## 2023-04-21 DIAGNOSIS — I27.20 PULMONARY HTN (HCC): ICD-10-CM

## 2023-04-21 DIAGNOSIS — I48.0 PAROXYSMAL ATRIAL FIBRILLATION (HCC): ICD-10-CM

## 2023-04-21 DIAGNOSIS — I34.0 NONRHEUMATIC MITRAL VALVE REGURGITATION: ICD-10-CM

## 2023-04-21 PROBLEM — Z79.01 LONG TERM (CURRENT) USE OF ANTICOAGULANTS: Status: RESOLVED | Noted: 2020-12-30 | Resolved: 2023-04-21

## 2023-04-21 LAB
ALBUMIN SERPL-MCNC: 4.2 G/DL (ref 3.4–5)
ALBUMIN/GLOB SERPL: 1.1 {RATIO} (ref 1–2)
ALP LIVER SERPL-CCNC: 71 U/L
ALT SERPL-CCNC: 25 U/L
ANION GAP SERPL CALC-SCNC: 13 MMOL/L (ref 0–18)
AST SERPL-CCNC: 20 U/L (ref 15–37)
BASOPHILS # BLD AUTO: 0.03 X10(3) UL (ref 0–0.2)
BASOPHILS NFR BLD AUTO: 0.6 %
BILIRUB SERPL-MCNC: 0.9 MG/DL (ref 0.1–2)
BILIRUB UR QL: NEGATIVE
BUN BLD-MCNC: 34 MG/DL (ref 7–18)
BUN/CREAT SERPL: 25 (ref 10–20)
CALCIUM BLD-MCNC: 9.7 MG/DL (ref 8.5–10.1)
CHLORIDE SERPL-SCNC: 107 MMOL/L (ref 98–112)
CLARITY UR: CLEAR
CO2 SERPL-SCNC: 24 MMOL/L (ref 21–32)
COLOR UR: COLORLESS
CREAT BLD-MCNC: 1.36 MG/DL
DEPRECATED RDW RBC AUTO: 45 FL (ref 35.1–46.3)
EOSINOPHIL # BLD AUTO: 0.13 X10(3) UL (ref 0–0.7)
EOSINOPHIL NFR BLD AUTO: 2.6 %
ERYTHROCYTE [DISTWIDTH] IN BLOOD BY AUTOMATED COUNT: 13.8 % (ref 11–15)
FASTING STATUS PATIENT QL REPORTED: YES
GFR SERPLBLD BASED ON 1.73 SQ M-ARVRAT: 41 ML/MIN/1.73M2 (ref 60–?)
GLOBULIN PLAS-MCNC: 3.7 G/DL (ref 2.8–4.4)
GLUCOSE BLD-MCNC: 97 MG/DL (ref 70–99)
GLUCOSE UR-MCNC: NORMAL MG/DL
HCT VFR BLD AUTO: 38.4 %
HGB BLD-MCNC: 12.2 G/DL
HGB UR QL STRIP.AUTO: NEGATIVE
IMM GRANULOCYTES # BLD AUTO: 0.01 X10(3) UL (ref 0–1)
IMM GRANULOCYTES NFR BLD: 0.2 %
KETONES UR-MCNC: NEGATIVE MG/DL
LEUKOCYTE ESTERASE UR QL STRIP.AUTO: NEGATIVE
LYMPHOCYTES # BLD AUTO: 0.8 X10(3) UL (ref 1–4)
LYMPHOCYTES NFR BLD AUTO: 15.9 %
MCH RBC QN AUTO: 28.6 PG (ref 26–34)
MCHC RBC AUTO-ENTMCNC: 31.8 G/DL (ref 31–37)
MCV RBC AUTO: 90.1 FL
MONOCYTES # BLD AUTO: 0.46 X10(3) UL (ref 0.1–1)
MONOCYTES NFR BLD AUTO: 9.1 %
NEUTROPHILS # BLD AUTO: 3.6 X10 (3) UL (ref 1.5–7.7)
NEUTROPHILS # BLD AUTO: 3.6 X10(3) UL (ref 1.5–7.7)
NEUTROPHILS NFR BLD AUTO: 71.6 %
NITRITE UR QL STRIP.AUTO: NEGATIVE
OSMOLALITY SERPL CALC.SUM OF ELEC: 306 MOSM/KG (ref 275–295)
PH UR: 5 [PH] (ref 5–8)
PLATELET # BLD AUTO: 161 10(3)UL (ref 150–450)
POTASSIUM SERPL-SCNC: 4.5 MMOL/L (ref 3.5–5.1)
PROT SERPL-MCNC: 7.9 G/DL (ref 6.4–8.2)
PROT UR-MCNC: NEGATIVE MG/DL
RBC # BLD AUTO: 4.26 X10(6)UL
SODIUM SERPL-SCNC: 144 MMOL/L (ref 136–145)
SP GR UR STRIP: 1.01 (ref 1–1.03)
T4 FREE SERPL-MCNC: 1.1 NG/DL (ref 0.8–1.7)
TSI SER-ACNC: 2.78 MIU/ML (ref 0.36–3.74)
UROBILINOGEN UR STRIP-ACNC: NORMAL
WBC # BLD AUTO: 5 X10(3) UL (ref 4–11)

## 2023-04-21 PROCEDURE — 36415 COLL VENOUS BLD VENIPUNCTURE: CPT

## 2023-04-21 PROCEDURE — 99213 OFFICE O/P EST LOW 20 MIN: CPT | Performed by: INTERNAL MEDICINE

## 2023-04-21 PROCEDURE — 85025 COMPLETE CBC W/AUTO DIFF WBC: CPT

## 2023-04-21 PROCEDURE — G0439 PPPS, SUBSEQ VISIT: HCPCS | Performed by: INTERNAL MEDICINE

## 2023-04-21 PROCEDURE — 84443 ASSAY THYROID STIM HORMONE: CPT

## 2023-04-21 PROCEDURE — 80053 COMPREHEN METABOLIC PANEL: CPT

## 2023-04-21 PROCEDURE — 84439 ASSAY OF FREE THYROXINE: CPT

## 2023-05-01 PROBLEM — I50.33 ACUTE ON CHRONIC HEART FAILURE WITH PRESERVED EJECTION FRACTION (HCC): Status: RESOLVED | Noted: 2021-08-24 | Resolved: 2023-05-01

## 2023-05-01 PROBLEM — I50.9 ACUTE CONGESTIVE HEART FAILURE, UNSPECIFIED HEART FAILURE TYPE (HCC): Status: RESOLVED | Noted: 2021-07-30 | Resolved: 2023-05-01

## 2023-05-09 ENCOUNTER — LAB ENCOUNTER (OUTPATIENT)
Dept: LAB | Age: 73
End: 2023-05-09
Attending: NURSE PRACTITIONER
Payer: MEDICARE

## 2023-05-09 DIAGNOSIS — N18.30 STAGE 3 CHRONIC KIDNEY DISEASE, UNSPECIFIED WHETHER STAGE 3A OR 3B CKD (HCC): ICD-10-CM

## 2023-05-09 LAB
ANION GAP SERPL CALC-SCNC: 6 MMOL/L (ref 0–18)
BUN BLD-MCNC: 42 MG/DL (ref 7–18)
BUN/CREAT SERPL: 34.1 (ref 10–20)
CALCIUM BLD-MCNC: 8.9 MG/DL (ref 8.5–10.1)
CHLORIDE SERPL-SCNC: 110 MMOL/L (ref 98–112)
CO2 SERPL-SCNC: 25 MMOL/L (ref 21–32)
CREAT BLD-MCNC: 1.23 MG/DL
FASTING STATUS PATIENT QL REPORTED: YES
GFR SERPLBLD BASED ON 1.73 SQ M-ARVRAT: 47 ML/MIN/1.73M2 (ref 60–?)
GLUCOSE BLD-MCNC: 89 MG/DL (ref 70–99)
OSMOLALITY SERPL CALC.SUM OF ELEC: 302 MOSM/KG (ref 275–295)
POTASSIUM SERPL-SCNC: 4.1 MMOL/L (ref 3.5–5.1)
SODIUM SERPL-SCNC: 141 MMOL/L (ref 136–145)

## 2023-05-09 PROCEDURE — 36415 COLL VENOUS BLD VENIPUNCTURE: CPT

## 2023-05-09 PROCEDURE — 80048 BASIC METABOLIC PNL TOTAL CA: CPT

## 2023-08-23 ENCOUNTER — TELEPHONE (OUTPATIENT)
Dept: CARDIOLOGY | Age: 73
End: 2023-08-23

## 2023-08-28 ENCOUNTER — HOSPITAL ENCOUNTER (OUTPATIENT)
Dept: MAMMOGRAPHY | Facility: HOSPITAL | Age: 73
Discharge: HOME | End: 2023-08-28
Attending: INTERNAL MEDICINE
Payer: MEDICARE

## 2023-08-28 ENCOUNTER — HOSPITAL ENCOUNTER (OUTPATIENT)
Dept: BONE DENSITY | Facility: HOSPITAL | Age: 73
Discharge: HOME | End: 2023-08-28
Attending: INTERNAL MEDICINE
Payer: MEDICARE

## 2023-08-28 DIAGNOSIS — Z12.31 VISIT FOR SCREENING MAMMOGRAM: ICD-10-CM

## 2023-08-28 DIAGNOSIS — Z78.0 POSTMENOPAUSAL: ICD-10-CM

## 2023-08-28 PROCEDURE — 77063 BREAST TOMOSYNTHESIS BI: CPT | Performed by: INTERNAL MEDICINE

## 2023-08-28 PROCEDURE — 77067 SCR MAMMO BI INCL CAD: CPT | Performed by: INTERNAL MEDICINE

## 2023-08-28 PROCEDURE — 77080 DXA BONE DENSITY AXIAL: CPT | Performed by: INTERNAL MEDICINE

## 2023-09-21 PROBLEM — E78.5 DYSLIPIDEMIA: Status: ACTIVE | Noted: 2023-09-21

## 2023-09-21 PROBLEM — I50.30 (HFPEF) HEART FAILURE WITH PRESERVED EJECTION FRACTION  (CMD): Status: ACTIVE | Noted: 2023-09-21

## 2023-09-21 PROBLEM — I10 HTN (HYPERTENSION): Status: ACTIVE | Noted: 2023-09-21

## 2023-09-21 PROBLEM — I35.0 NONRHEUMATIC AORTIC (VALVE) STENOSIS: Status: ACTIVE | Noted: 2023-09-21

## 2023-09-21 PROBLEM — I48.91 A-FIB  (CMD): Status: ACTIVE | Noted: 2023-09-21

## 2023-09-21 PROBLEM — I34.0 NONRHEUMATIC MITRAL VALVE REGURGITATION: Status: ACTIVE | Noted: 2023-09-21

## 2023-09-21 RX ORDER — LANOLIN ALCOHOL/MO/W.PET/CERES
1000 CREAM (GRAM) TOPICAL DAILY
COMMUNITY
End: 2023-11-27

## 2023-09-21 RX ORDER — WARFARIN SODIUM 5 MG/1
5 TABLET ORAL DAILY
COMMUNITY
Start: 2023-08-03

## 2023-09-21 RX ORDER — ENALAPRIL MALEATE 20 MG/1
1 TABLET ORAL 2 TIMES DAILY
COMMUNITY
Start: 2023-06-22

## 2023-09-21 RX ORDER — ATORVASTATIN CALCIUM 10 MG/1
10 TABLET, FILM COATED ORAL DAILY
COMMUNITY
Start: 2023-08-23

## 2023-09-21 RX ORDER — METOPROLOL SUCCINATE 50 MG/1
50 TABLET, EXTENDED RELEASE ORAL DAILY
COMMUNITY
Start: 2023-06-29

## 2023-09-21 RX ORDER — FUROSEMIDE 20 MG/1
20 TABLET ORAL DAILY
COMMUNITY
Start: 2023-06-29

## 2023-09-25 ENCOUNTER — OFFICE VISIT (OUTPATIENT)
Dept: CARDIOLOGY | Age: 73
End: 2023-09-25

## 2023-09-25 ENCOUNTER — EXTERNAL LAB (OUTPATIENT)
Dept: CARDIOLOGY | Age: 73
End: 2023-09-25

## 2023-09-25 VITALS
RESPIRATION RATE: 18 BRPM | SYSTOLIC BLOOD PRESSURE: 147 MMHG | HEART RATE: 69 BPM | DIASTOLIC BLOOD PRESSURE: 72 MMHG | WEIGHT: 137.68 LBS | HEIGHT: 63 IN | BODY MASS INDEX: 24.39 KG/M2

## 2023-09-25 DIAGNOSIS — I42.1 HOCM (HYPERTROPHIC OBSTRUCTIVE CARDIOMYOPATHY) (CMD): ICD-10-CM

## 2023-09-25 DIAGNOSIS — I50.32 CHRONIC HEART FAILURE WITH PRESERVED EJECTION FRACTION (CMD): Primary | ICD-10-CM

## 2023-09-25 LAB — DIAGNOSTIC TESTING SUMMARY: NEGATIVE

## 2023-09-25 PROCEDURE — 99214 OFFICE O/P EST MOD 30 MIN: CPT | Performed by: INTERNAL MEDICINE

## 2023-09-25 SDOH — HEALTH STABILITY: PHYSICAL HEALTH: ON AVERAGE, HOW MANY DAYS PER WEEK DO YOU ENGAGE IN MODERATE TO STRENUOUS EXERCISE (LIKE A BRISK WALK)?: 0 DAYS

## 2023-09-25 SDOH — HEALTH STABILITY: PHYSICAL HEALTH: ON AVERAGE, HOW MANY MINUTES DO YOU ENGAGE IN EXERCISE AT THIS LEVEL?: 0 MIN

## 2023-09-25 ASSESSMENT — ENCOUNTER SYMPTOMS
WEIGHT LOSS: 0
LIGHT-HEADEDNESS: 0
BLOATING: 0
HEADACHES: 0
SHORTNESS OF BREATH: 0
ANOREXIA: 0
DEPRESSION: 0
LOSS OF BALANCE: 1
DIZZINESS: 0
ABDOMINAL PAIN: 0
WEIGHT GAIN: 0
INSOMNIA: 0

## 2023-09-25 ASSESSMENT — PATIENT HEALTH QUESTIONNAIRE - PHQ9
SUM OF ALL RESPONSES TO PHQ9 QUESTIONS 1 AND 2: 0
CLINICAL INTERPRETATION OF PHQ2 SCORE: NO FURTHER SCREENING NEEDED
1. LITTLE INTEREST OR PLEASURE IN DOING THINGS: NOT AT ALL
SUM OF ALL RESPONSES TO PHQ9 QUESTIONS 1 AND 2: 0
2. FEELING DOWN, DEPRESSED OR HOPELESS: NOT AT ALL

## 2023-09-26 ASSESSMENT — NEW YORK HEART ASSOCIATION (NYHA) CLASSIFICATION: NYHA FUNCTIONAL CLASS: III

## 2023-09-27 ENCOUNTER — MED REC SCAN ONLY (OUTPATIENT)
Dept: INTERNAL MEDICINE CLINIC | Facility: CLINIC | Age: 73
End: 2023-09-27

## 2023-09-29 ENCOUNTER — ANCILLARY PROCEDURE (OUTPATIENT)
Dept: CARDIOLOGY | Age: 73
End: 2023-09-29
Attending: INTERNAL MEDICINE

## 2023-09-29 DIAGNOSIS — I42.1 HOCM (HYPERTROPHIC OBSTRUCTIVE CARDIOMYOPATHY) (CMD): ICD-10-CM

## 2023-09-29 DIAGNOSIS — I50.32 CHRONIC HEART FAILURE WITH PRESERVED EJECTION FRACTION (CMD): ICD-10-CM

## 2023-09-29 LAB
AORTIC VALVE AREA (AVA): 2.09
ASCENDING AORTA (AAD): 3
AV MEAN GRADIENT (AVMG): 15
AV MEAN VELOCITY (AVMV): 1.77
AV PEAK GRADIENT (AVPG): 26
AV PEAK VELOCITY (AVPV): 2.54
AV STENOSIS SEVERITY TEXT: NORMAL
AVI LVOT PEAK GRADIENT (LVOTMG): 1.2
E WAVE DECELARATION TIME (MDT): 73.33
INTERVENTRICULAR SEPTUM IN END DIASTOLE (IVSD): 2.4
LEFT INTERNAL DIMENSION IN SYSTOLE (LVSD): 1.8
LEFT VENTRICULAR INTERNAL DIMENSION IN DIASTOLE (LVDD): 2.7
LEFT VENTRICULAR POSTERIOR WALL IN END DIASTOLE (LVPW): 4.2
LV EF: NORMAL %
MV E TISSUE VEL MED (MESV): 3.15
MV E WAVE VEL/E TISSUE VEL MED(MSR): 2.85
MV PEAK A VELOCITY (MVPAV): 414
MV PEAK E VELOCITY (MVPEV): 1.25
RV END SYSTOLIC LONGITUDINAL STRAIN FREE WALL (RVGS): 1.8
TRICUSPID VALVE ANNULAR PEAK VELOCITY (TVAPV): 30
TRICUSPID VALVE PEAK REGURGITATION VELOCITY (TRPV): 2.7
TV ESTIMATED RIGHT ARTERIAL PRESSURE (RAP): 8.49

## 2023-09-29 PROCEDURE — 76376 3D RENDER W/INTRP POSTPROCES: CPT | Performed by: INTERNAL MEDICINE

## 2023-09-29 PROCEDURE — 93306 TTE W/DOPPLER COMPLETE: CPT | Performed by: INTERNAL MEDICINE

## 2023-09-29 PROCEDURE — 93356 MYOCRD STRAIN IMG SPCKL TRCK: CPT | Performed by: INTERNAL MEDICINE

## 2023-10-02 ENCOUNTER — TELEPHONE (OUTPATIENT)
Dept: CARDIOLOGY | Age: 73
End: 2023-10-02

## 2023-10-06 ENCOUNTER — TELEPHONE (OUTPATIENT)
Dept: CARDIOLOGY | Age: 73
End: 2023-10-06

## 2023-10-11 ENCOUNTER — TELEPHONE (OUTPATIENT)
Dept: CARDIOLOGY | Age: 73
End: 2023-10-11

## 2023-10-16 ENCOUNTER — TELEPHONE (OUTPATIENT)
Dept: CARDIOLOGY | Age: 73
End: 2023-10-16

## 2023-10-16 ENCOUNTER — OFF PREMISE (OUTPATIENT)
Dept: CARDIOLOGY | Age: 73
End: 2023-10-16

## 2023-10-16 PROBLEM — I42.2 HYPERTROPHIC CARDIOMYOPATHY  (CMD): Status: ACTIVE | Noted: 2023-10-16

## 2023-10-30 ENCOUNTER — TELEPHONE (OUTPATIENT)
Dept: CARDIOLOGY | Age: 73
End: 2023-10-30

## 2023-10-30 DIAGNOSIS — I42.2 HYPERTROPHIC CARDIOMYOPATHY (CMD): Primary | ICD-10-CM

## 2023-11-14 ENCOUNTER — HOSPITAL ENCOUNTER (OUTPATIENT)
Dept: MRI IMAGING | Age: 73
Discharge: HOME | End: 2023-11-14
Attending: INTERNAL MEDICINE

## 2023-11-14 ENCOUNTER — LAB SERVICES (OUTPATIENT)
Dept: LAB | Age: 73
End: 2023-11-14

## 2023-11-14 DIAGNOSIS — I42.1 HOCM (HYPERTROPHIC OBSTRUCTIVE CARDIOMYOPATHY) (CMD): ICD-10-CM

## 2023-11-14 DIAGNOSIS — I50.32 CHRONIC HEART FAILURE WITH PRESERVED EJECTION FRACTION (CMD): ICD-10-CM

## 2023-11-14 LAB — HCT VFR BLD CALC: 39 % (ref 36–46.5)

## 2023-11-14 PROCEDURE — 85014 HEMATOCRIT: CPT | Performed by: INTERNAL MEDICINE

## 2023-11-14 PROCEDURE — 36415 COLL VENOUS BLD VENIPUNCTURE: CPT | Performed by: INTERNAL MEDICINE

## 2023-11-14 PROCEDURE — G1004 CDSM NDSC: HCPCS | Performed by: INTERNAL MEDICINE

## 2023-11-14 PROCEDURE — 75561 CARDIAC MRI FOR MORPH W/DYE: CPT | Performed by: INTERNAL MEDICINE

## 2023-11-14 PROCEDURE — G1004 CDSM NDSC: HCPCS

## 2023-11-14 PROCEDURE — 75561 CARDIAC MRI FOR MORPH W/DYE: CPT

## 2023-11-14 PROCEDURE — 10002805 HB CONTRAST AGENT: Performed by: INTERNAL MEDICINE

## 2023-11-14 PROCEDURE — A9585 GADOBUTROL INJECTION: HCPCS | Performed by: INTERNAL MEDICINE

## 2023-11-14 RX ORDER — GADOBUTROL 604.72 MG/ML
13 INJECTION INTRAVENOUS ONCE
Status: COMPLETED | OUTPATIENT
Start: 2023-11-14 | End: 2023-11-14

## 2023-11-14 RX ADMIN — GADOBUTROL 13 ML: 604.72 INJECTION INTRAVENOUS at 12:10

## 2023-11-22 ENCOUNTER — APPOINTMENT (OUTPATIENT)
Dept: CARDIOLOGY | Age: 73
End: 2023-11-22
Attending: INTERNAL MEDICINE

## 2023-11-22 DIAGNOSIS — I42.2 HYPERTROPHIC CARDIOMYOPATHY (CMD): ICD-10-CM

## 2023-11-22 LAB
AV STENOSIS SEVERITY TEXT: NORMAL
AVI LVOT PEAK GRADIENT (LVOTMG): 1.2
LEFT INTERNAL DIMENSION IN SYSTOLE (LVSD): 1.4
LEFT VENTRICULAR INTERNAL DIMENSION IN DIASTOLE (LVDD): 2.5
LEFT VENTRICULAR POSTERIOR WALL IN END DIASTOLE (LVPW): 3.7
LV EF: NORMAL %
RV END SYSTOLIC LONGITUDINAL STRAIN FREE WALL (RVGS): 2

## 2023-11-22 PROCEDURE — 93325 DOPPLER ECHO COLOR FLOW MAPG: CPT | Performed by: INTERNAL MEDICINE

## 2023-11-22 PROCEDURE — 93308 TTE F-UP OR LMTD: CPT | Performed by: INTERNAL MEDICINE

## 2023-11-22 PROCEDURE — 93321 DOPPLER ECHO F-UP/LMTD STD: CPT | Performed by: INTERNAL MEDICINE

## 2023-11-24 ENCOUNTER — TELEPHONE (OUTPATIENT)
Dept: CARDIOLOGY | Age: 73
End: 2023-11-24

## 2023-11-26 ENCOUNTER — HOSPITAL ENCOUNTER (OUTPATIENT)
Age: 73
Discharge: HOME | End: 2023-11-26
Payer: MEDICARE

## 2023-11-26 ENCOUNTER — APPOINTMENT (OUTPATIENT)
Dept: GENERAL RADIOLOGY | Age: 73
End: 2023-11-26
Attending: NURSE PRACTITIONER
Payer: MEDICARE

## 2023-11-26 VITALS
OXYGEN SATURATION: 93 % | HEART RATE: 74 BPM | BODY MASS INDEX: 23.05 KG/M2 | TEMPERATURE: 98 F | DIASTOLIC BLOOD PRESSURE: 63 MMHG | SYSTOLIC BLOOD PRESSURE: 142 MMHG | WEIGHT: 135 LBS | RESPIRATION RATE: 18 BRPM | HEIGHT: 64 IN

## 2023-11-26 DIAGNOSIS — B97.89 VIRAL RESPIRATORY ILLNESS: Primary | ICD-10-CM

## 2023-11-26 DIAGNOSIS — Z20.822 ENCOUNTER FOR LABORATORY TESTING FOR COVID-19 VIRUS: ICD-10-CM

## 2023-11-26 DIAGNOSIS — J98.8 VIRAL RESPIRATORY ILLNESS: Primary | ICD-10-CM

## 2023-11-26 LAB — SARS-COV-2 RNA RESP QL NAA+PROBE: NOT DETECTED

## 2023-11-26 PROCEDURE — 71046 X-RAY EXAM CHEST 2 VIEWS: CPT | Performed by: NURSE PRACTITIONER

## 2023-11-26 PROCEDURE — 99213 OFFICE O/P EST LOW 20 MIN: CPT | Performed by: NURSE PRACTITIONER

## 2023-11-26 PROCEDURE — U0002 COVID-19 LAB TEST NON-CDC: HCPCS | Performed by: NURSE PRACTITIONER

## 2023-11-26 RX ORDER — BENZONATATE 100 MG/1
100 CAPSULE ORAL 3 TIMES DAILY PRN
Qty: 20 CAPSULE | Refills: 0 | Status: SHIPPED | OUTPATIENT
Start: 2023-11-26 | End: 2023-12-03

## 2023-11-26 NOTE — DISCHARGE INSTRUCTIONS
Make sure to stay well hydrated with clear fluids. You can use Tylenol every 6 hours to control fever or discomfort. Warm salt water gargles, throat lozenges, and warmed beverages can be additionally helpful for a sore throat. Using a humidifier in the bedroom at night, and sleeping propped up on pillows/somewhat of an incline can also be helpful. Cover your cough and wash your hands frequently to prevent the spread of infection. Follow up with your primary care provider in the next 2-3 days. Seek additional care in the ER for fever that is not controlled with Tylenol and Motrin, difficulty breathing or shortness of breath, chest pain, or any new/worsening symptoms.

## 2023-11-27 ENCOUNTER — TELEPHONE (OUTPATIENT)
Dept: CARDIOLOGY | Age: 73
End: 2023-11-27

## 2023-11-27 DIAGNOSIS — I42.2 HYPERTROPHIC CARDIOMYOPATHY (CMD): Primary | ICD-10-CM

## 2023-11-27 RX ORDER — MAVACAMTEN 5 MG/1
5 CAPSULE, GELATIN COATED ORAL DAILY
Qty: 30 CAPSULE | Refills: 11 | Status: SHIPPED | OUTPATIENT
Start: 2023-11-27

## 2023-12-01 ENCOUNTER — APPOINTMENT (OUTPATIENT)
Dept: CARDIOLOGY | Age: 73
End: 2023-12-01

## 2023-12-01 VITALS
BODY MASS INDEX: 23.91 KG/M2 | SYSTOLIC BLOOD PRESSURE: 154 MMHG | DIASTOLIC BLOOD PRESSURE: 67 MMHG | HEART RATE: 66 BPM | HEIGHT: 63 IN | RESPIRATION RATE: 18 BRPM | WEIGHT: 134.92 LBS

## 2023-12-01 DIAGNOSIS — I42.2 HYPERTROPHIC CARDIOMYOPATHY (CMD): Primary | ICD-10-CM

## 2023-12-01 PROCEDURE — 99215 OFFICE O/P EST HI 40 MIN: CPT | Performed by: INTERNAL MEDICINE

## 2023-12-01 SDOH — HEALTH STABILITY: PHYSICAL HEALTH: ON AVERAGE, HOW MANY MINUTES DO YOU ENGAGE IN EXERCISE AT THIS LEVEL?: 0 MIN

## 2023-12-01 SDOH — HEALTH STABILITY: PHYSICAL HEALTH: ON AVERAGE, HOW MANY DAYS PER WEEK DO YOU ENGAGE IN MODERATE TO STRENUOUS EXERCISE (LIKE A BRISK WALK)?: 0 DAYS

## 2023-12-01 ASSESSMENT — ENCOUNTER SYMPTOMS
HEADACHES: 0
LOSS OF BALANCE: 1
WEIGHT LOSS: 1
INSOMNIA: 0
WEIGHT GAIN: 0
LIGHT-HEADEDNESS: 0
DIZZINESS: 0
ANOREXIA: 0
BLOATING: 0
ABDOMINAL PAIN: 0
DEPRESSION: 0
SHORTNESS OF BREATH: 0

## 2023-12-01 ASSESSMENT — PATIENT HEALTH QUESTIONNAIRE - PHQ9
CLINICAL INTERPRETATION OF PHQ2 SCORE: NO FURTHER SCREENING NEEDED
SUM OF ALL RESPONSES TO PHQ9 QUESTIONS 1 AND 2: 0
2. FEELING DOWN, DEPRESSED OR HOPELESS: NOT AT ALL
1. LITTLE INTEREST OR PLEASURE IN DOING THINGS: NOT AT ALL
SUM OF ALL RESPONSES TO PHQ9 QUESTIONS 1 AND 2: 0

## 2023-12-12 ENCOUNTER — MED REC SCAN ONLY (OUTPATIENT)
Dept: INTERNAL MEDICINE CLINIC | Facility: CLINIC | Age: 73
End: 2023-12-12

## 2023-12-20 ENCOUNTER — APPOINTMENT (OUTPATIENT)
Dept: CARDIOLOGY | Age: 73
End: 2023-12-20
Attending: INTERNAL MEDICINE

## 2023-12-20 ENCOUNTER — TELEPHONE (OUTPATIENT)
Dept: CARDIOLOGY | Age: 73
End: 2023-12-20

## 2023-12-20 DIAGNOSIS — I42.2 HYPERTROPHIC CARDIOMYOPATHY (CMD): ICD-10-CM

## 2023-12-20 LAB
AV STENOSIS SEVERITY TEXT: NORMAL
AVI LVOT PEAK GRADIENT (LVOTMG): 1.3
LEFT INTERNAL DIMENSION IN SYSTOLE (LVSD): 1.3
LEFT VENTRICULAR INTERNAL DIMENSION IN DIASTOLE (LVDD): 2.7
LEFT VENTRICULAR POSTERIOR WALL IN END DIASTOLE (LVPW): 3.9
LV EF: NORMAL %
RV END SYSTOLIC LONGITUDINAL STRAIN FREE WALL (RVGS): 2

## 2023-12-20 PROCEDURE — 93306 TTE W/DOPPLER COMPLETE: CPT | Performed by: INTERNAL MEDICINE

## 2023-12-20 RX ORDER — MAVACAMTEN 5 MG/1
5 CAPSULE, GELATIN COATED ORAL DAILY
Qty: 30 CAPSULE | Refills: 11 | Status: SHIPPED | OUTPATIENT
Start: 2023-12-20 | End: 2023-12-20 | Stop reason: SDUPTHER

## 2023-12-20 RX ORDER — MAVACAMTEN 5 MG/1
5 CAPSULE, GELATIN COATED ORAL DAILY
Qty: 30 CAPSULE | Refills: 11 | Status: SHIPPED | OUTPATIENT
Start: 2023-12-20

## 2024-01-17 ENCOUNTER — APPOINTMENT (OUTPATIENT)
Dept: CARDIOLOGY | Age: 74
End: 2024-01-17
Attending: INTERNAL MEDICINE

## 2024-01-17 DIAGNOSIS — I42.2 HYPERTROPHIC CARDIOMYOPATHY (CMD): ICD-10-CM

## 2024-01-17 LAB
AV STENOSIS SEVERITY TEXT: NORMAL
AVI LVOT PEAK GRADIENT (LVOTMG): 1.3
LEFT INTERNAL DIMENSION IN SYSTOLE (LVSD): 1.3
LEFT VENTRICULAR INTERNAL DIMENSION IN DIASTOLE (LVDD): 2.7
LEFT VENTRICULAR POSTERIOR WALL IN END DIASTOLE (LVPW): 4
LV EF: NORMAL %
RV END SYSTOLIC LONGITUDINAL STRAIN FREE WALL (RVGS): 2

## 2024-01-17 PROCEDURE — 93308 TTE F-UP OR LMTD: CPT | Performed by: INTERNAL MEDICINE

## 2024-01-17 PROCEDURE — 93325 DOPPLER ECHO COLOR FLOW MAPG: CPT | Performed by: INTERNAL MEDICINE

## 2024-01-17 PROCEDURE — 93321 DOPPLER ECHO F-UP/LMTD STD: CPT | Performed by: INTERNAL MEDICINE

## 2024-01-19 ENCOUNTER — TELEPHONE (OUTPATIENT)
Dept: CARDIOLOGY | Age: 74
End: 2024-01-19

## 2024-01-19 DIAGNOSIS — I42.2 HYPERTROPHIC CARDIOMYOPATHY (CMD): Primary | ICD-10-CM

## 2024-01-19 RX ORDER — MAVACAMTEN 5 MG/1
5 CAPSULE, GELATIN COATED ORAL DAILY
Qty: 30 CAPSULE | Refills: 11 | Status: SHIPPED | OUTPATIENT
Start: 2024-01-19

## 2024-02-01 ENCOUNTER — HOSPITAL ENCOUNTER (EMERGENCY)
Facility: HOSPITAL | Age: 74
Discharge: HOME | End: 2024-02-01
Attending: EMERGENCY MEDICINE
Payer: MEDICARE

## 2024-02-01 ENCOUNTER — APPOINTMENT (OUTPATIENT)
Dept: GENERAL RADIOLOGY | Facility: HOSPITAL | Age: 74
End: 2024-02-01
Payer: MEDICARE

## 2024-02-01 VITALS
HEIGHT: 64 IN | TEMPERATURE: 99 F | OXYGEN SATURATION: 96 % | BODY MASS INDEX: 23.05 KG/M2 | WEIGHT: 135 LBS | SYSTOLIC BLOOD PRESSURE: 169 MMHG | HEART RATE: 60 BPM | RESPIRATION RATE: 16 BRPM | DIASTOLIC BLOOD PRESSURE: 63 MMHG

## 2024-02-01 DIAGNOSIS — S82.832A OTHER CLOSED FRACTURE OF DISTAL END OF LEFT FIBULA, INITIAL ENCOUNTER: Primary | ICD-10-CM

## 2024-02-01 PROCEDURE — 73610 X-RAY EXAM OF ANKLE: CPT

## 2024-02-01 PROCEDURE — 99283 EMERGENCY DEPT VISIT LOW MDM: CPT

## 2024-02-01 PROCEDURE — 99284 EMERGENCY DEPT VISIT MOD MDM: CPT

## 2024-02-01 RX ORDER — TRAMADOL HYDROCHLORIDE 50 MG/1
50 TABLET ORAL EVERY 6 HOURS PRN
Qty: 10 TABLET | Refills: 0 | Status: SHIPPED | OUTPATIENT
Start: 2024-02-01 | End: 2024-02-08

## 2024-02-01 RX ORDER — ACETAMINOPHEN 500 MG
500 TABLET ORAL ONCE
Status: COMPLETED | OUTPATIENT
Start: 2024-02-01 | End: 2024-02-01

## 2024-02-01 NOTE — ED PROVIDER NOTES
Patient Seen in: [facility] Emergency Department    History     Chief Complaint   Patient presents with    Fall       HPI    History is provided by patient/independent historian: Patient  73-year-old female with history of atrial fibrillation, hypertension, hyperlipidemia, here after a trip and fall on her deck earlier this morning.  Patient states that she slipped and landed on her left ankle.  No head injury or loss consciousness or neck pain.  No other injuries.  She is complaining of left ankle pain and swelling.    History reviewed.   Past Medical History:   Diagnosis Date    Acute congestive heart failure, unspecified heart failure type (HCC) 2021    Acute on chronic heart failure with preserved ejection fraction (HCC) 2021    Arrhythmia     Atrial fibrillation (HCC)     Chronic atrial fibrillation (HCC)     High blood pressure     High cholesterol     Hx of headache     Irregular heart rate     Mitral valve regurgitation     Murmur 2018    Murmur 2018    Unspecified essential hypertension     Vertigo          History reviewed.   Past Surgical History:   Procedure Laterality Date    COLONOSCOPY      CYST REMOVAL      bilateral breasts    ELECTROCARDIOGRAM, COMPLETE  2014    SCANNED TO MEDIA TAB: 2014    HYSTERECTOMY      age 35    LEE LOCALIZATION WIRE 1 SITE LEFT (CPT=19281)      LEE LOCALIZATION WIRE 1 SITE RIGHT (CPT=19281)           Home Medications reviewed :  (Not in a hospital admission)        History reviewed.   Social History     Socioeconomic History    Marital status:    Tobacco Use    Smoking status: Former     Packs/day: 1.00     Years: 15.00     Additional pack years: 0.00     Total pack years: 15.00     Types: Cigarettes     Quit date: 1981     Years since quittin.0    Smokeless tobacco: Former     Quit date: 2001   Vaping Use    Vaping Use: Never used   Substance and Sexual Activity    Alcohol use: Not Currently      Comment: rare    Drug use: No   Other Topics Concern    Caffeine Concern No         ROS  Review of Systems   Respiratory:  Negative for shortness of breath.    Cardiovascular:  Negative for chest pain.   Musculoskeletal:  Positive for arthralgias and joint swelling.   All other systems reviewed and are negative.     All other pertinent organ systems are reviewed and are negative.      Physical Exam     ED Triage Vitals [02/01/24 0823]   BP (!) 178/81   Pulse 74   Resp 16   Temp 98.6 °F (37 °C)   Temp src Temporal   SpO2 97 %   O2 Device None (Room air)     Vital signs reviewed.      Physical Exam  Vitals and nursing note reviewed.   Cardiovascular:      Pulses: Normal pulses.   Pulmonary:      Effort: No respiratory distress.   Abdominal:      General: There is no distension.   Musculoskeletal:      Comments: L lateral malleolus swelling, tenderness, bruising. No midfoot tenderness, no fibular head tenderness   Neurological:      Mental Status: She is alert.         ED Course       Labs:   Labs Reviewed - No data to display      My EKG Interpretation:   As reviewed and Interpreted by me      Imaging Results Available and Reviewed while in ED:   XR ANKLE (MIN 3 VIEWS), LEFT (CPT=73610)    Result Date: 2/1/2024  CONCLUSION:  1. Mildly displaced oblique fracture of the distal fibula.  Intact tibiotalar joint.  Moderate lateral and anterior ankle soft tissue swelling.  Correlate clinically.    Dictated by (CST): Noe Velasquez MD on 2/01/2024 at 8:52 AM     Finalized by (CST): Noe Velasquez MD on 2/01/2024 at 8:53 AM         My review and independent interpretation of XR images: distal fibula fracture. Radiology report corroborates this in addition to other details as reported by them.      Decision rules/scores evaluated: none      Diagnostic labs/tests considered but not ordered: CBC, BMP, type and screen    ED Medications Administered:   Medications   acetaminophen (Tylenol Extra Strength) tab 500 mg (500 mg Oral  Given 2/1/24 0827)                [facility]       Medical Decision Making      Differential Diagnosis: After obtaining the patient's history, performing the physical exam and reviewing the diagnostics, multiple initial diagnoses were considered based on the presenting problem including fracture, sprain, contusion, dislocation    External document review: I personally reviewed available external medical records for any recent pertinent discharge summaries, testing, and procedures - the findings are as follows: 1/25/24 visit with Dr. Greco for hypertrophic cardiomyopathy    Complicating Factors: The patient already  has a past medical history of Acute congestive heart failure, unspecified heart failure type (HCC) (7/30/2021), Acute on chronic heart failure with preserved ejection fraction (HCC) (8/24/2021), Arrhythmia, Atrial fibrillation (HCC), Chronic atrial fibrillation (HCC), High blood pressure, High cholesterol, headache, Irregular heart rate, Mitral valve regurgitation, Murmur (7/31/2018), Murmur (7/31/2018), Unspecified essential hypertension, and Vertigo. to contribute to the complexity of this ED evaluation.    Procedures performed:   Pain Control  The patient was offered pain medication in the Emergency Department.  Analgesia was given.    PROCEDURE:  Splint application  A short leg splint was applied to the patient by a tech and adjusted by me.  The patient was neurovascularly intact as checked by me after application.      Discussed management with physician/appropriate source: none    Considered admission/deescalation of care for: none    Social determinants of health affecting patient care: none    Prescription medications considered: tramadol, discussed continuing current medication regimen    The patient requires continuous monitoring for: ankle pain/swelling    Shared decision making: discussed possible admission    The patient was informed of their elevated blood pressure reading in the Emergency  Department.  They were informed of the dangers of undiagnosed and untreated hypertension.  Education regarding lifestyle modifications and the need for appropriate follow-up with their PCP to have their blood pressure re-checked within 1 week was provided.         Disposition and Plan     Clinical Impression:  1. Other closed fracture of distal end of left fibula, initial encounter        Disposition:  Discharge    Follow-up:  John Pinon MD  [address] [address]  [phone]    Follow up        Medications Prescribed:  Current Discharge Medication List        START taking these medications    Details   traMADol 50 MG Oral Tab Take 1 tablet (50 mg total) by mouth every 6 (six) hours as needed.  Qty: 10 tablet, Refills: 0

## 2024-02-01 NOTE — ED QUICK NOTES
Pt A&OX4, pt denies dizziness/lightheadedness, cp, sob, n/v. Discharge paperwork, prescription, and follow-up discussed with pt, pt verbally understands them. Pt discharged via wheelchair in no acute distress. CMS Intact distal to splint application.

## 2024-02-01 NOTE — ED INITIAL ASSESSMENT (HPI)
Pt to ED A&O x 4 s/p mechanical fall this AM.  Pt c/o L ankle pain.  (+) swelling to L lateral ankle.  CMS intact.  Pt did not hit head, no LOC.  Pt does take Coumadin.

## 2024-02-07 ENCOUNTER — OFFICE VISIT (OUTPATIENT)
Dept: ORTHOPEDICS CLINIC | Facility: CLINIC | Age: 74
End: 2024-02-07
Payer: MEDICARE

## 2024-02-07 DIAGNOSIS — S82.832A CLOSED FRACTURE OF DISTAL END OF LEFT FIBULA, UNSPECIFIED FRACTURE MORPHOLOGY, INITIAL ENCOUNTER: Primary | ICD-10-CM

## 2024-02-07 PROCEDURE — 99204 OFFICE O/P NEW MOD 45 MIN: CPT | Performed by: ORTHOPAEDIC SURGERY

## 2024-02-07 PROCEDURE — 27786 TREATMENT OF ANKLE FRACTURE: CPT | Performed by: ORTHOPAEDIC SURGERY

## 2024-02-07 NOTE — PROGRESS NOTES
NURSING INTAKE COMMENTS:   Chief Complaint   Patient presents with    Ankle Injury     ER Follow up. Left ankle injury. Patient fell on her deck at home on 2/1. Patient rates her pain 5/10 at this time. X-Rays were done on 2/1       HPI: This 73 year old female presents today with complaints of left ankle pain.  She injured her left ankle 6 days ago.  She slid on her deck at home and forcefully inverted the ankle.  She was seen in the urgent care and diagnosed with a fracture.  She has been in a splint.  She has remained nonweightbearing.  No prior problems with the ankle.    Past Medical History:   Diagnosis Date    Acute congestive heart failure, unspecified heart failure type (HCC) 7/30/2021    Acute on chronic heart failure with preserved ejection fraction (HCC) 8/24/2021    Arrhythmia     Atrial fibrillation (HCC)     Chronic atrial fibrillation (HCC)     High blood pressure     High cholesterol     Hx of headache     Irregular heart rate     Mitral valve regurgitation     Murmur 7/31/2018    Murmur 7/31/2018    Unspecified essential hypertension     Vertigo      Past Surgical History:   Procedure Laterality Date    COLONOSCOPY      CYST REMOVAL      bilateral breasts    ELECTROCARDIOGRAM, COMPLETE  05-    SCANNED TO MEDIA TAB: 05-    HYSTERECTOMY      age 35    LEE LOCALIZATION WIRE 1 SITE LEFT (CPT=19281)  1998    LEE LOCALIZATION WIRE 1 SITE RIGHT (CPT=19281)  1998     Current Outpatient Medications   Medication Sig Dispense Refill    furosemide 20 MG Oral Tab Take 1 tablet (20 mg total) by mouth daily. Take additional 20mg as needed for leg swelling/wt gain 110 tablet 3    enalapril 20 MG Oral Tab Take 1 tablet (20 mg total) by mouth 2 (two) times daily. 180 tablet 1    warfarin 5 MG Oral Tab TAKE ONE TABLET 4-DAYS WEEKLY AND TAKE ONE-HALF TABLET 3-DAYS WEEKLY OR PER ANTICOAGULATION CLINIC DIRECTION 270 tablet 1    ATORVASTATIN 10 MG Oral Tab TAKE 1 TABLET BY MOUTH EVERY DAY IN THE EVENING 90  tablet 2    METOPROLOL SUCCINATE 50 MG Oral Tablet 24 Hr TAKE 1 TABLET BY MOUTH EVERY DAY (Patient taking differently: every evening.) 90 tablet 3    Calcium Carbonate (CALCIUM 500 OR) Take 1 tablet by mouth daily as needed.        Multiple Vitamin (MULTI-DAY) Oral Tab Take by mouth nightly. take 1 tablet by ORAL route  every day with food       traMADol 50 MG Oral Tab Take 1 tablet (50 mg total) by mouth every 6 (six) hours as needed. 10 tablet 0     No Known Allergies  Family History   Problem Relation Age of Onset    Pulmonary Disease Mother         COPD; Cause of death    Cancer Father         Liver cancer    Diabetes Maternal Grandmother     Breast Cancer Sister 36       Social History     Occupational History    Not on file   Tobacco Use    Smoking status: Former     Packs/day: 1.00     Years: 15.00     Additional pack years: 0.00     Total pack years: 15.00     Types: Cigarettes     Quit date: 1981     Years since quittin.0    Smokeless tobacco: Former     Quit date: 2001   Vaping Use    Vaping Use: Never used   Substance and Sexual Activity    Alcohol use: Not Currently     Comment: rare    Drug use: No    Sexual activity: Not on file        Review of Systems:  GENERAL: denies fevers, chills, night sweats, fatigue, unintentional weight loss/gain  SKIN: denies skin lesions, open sores, rash  HEENT:denies recent vision change, new nasal congestion,hearing loss, tinnitus, sore throat, headaches  RESPIRATORY: denies new shortness of breath, cough, asthma, wheezing  CARDIOVASCULAR: denies chest pain, leg cramps with exertion, palpitations, leg swelling  GI: denies abdominal pain, nausea, vomiting, diarrhea, constipation, hematochezia, worsening heartburn or stomach ulcers  : denies dysuria, hematuria, incontinence, increased frequency, urgency, difficulty urinating  MUSCULOSKELETAL: denies musculoskeletal complaints other than in HPI  NEURO: denies numbness, tingling, weakness, balance issues,  dizziness, memory loss  PSYCHIATRIC: denies Hx of depression, anxiety, other psychiatric disorders  HEMATOLOGIC: denies blood clots, anemia, blood clotting disorders, blood transfusion  ENDOCRINE: denies autoimmune disease, thyroid issues, or diabetes  ALLERGY: denies asthma, seasonal allergies    Physical Examination:    There were no vitals taken for this visit.  Constitutional: appears well hydrated, alert and responsive, no acute distress noted  Extremities: Left ankle moderate circumferential swelling.  Ecchymosis over the medial and lateral ankle.  Minimal tenderness over the deltoid ligament.  Tender over the distal fibula.  Proximal fibula nontender.  Neurological: Light touch and pinprick sensation intact throughout the lower extremities.  Ankle dorsiflexion plantarflexion EHL knee extension and hip flexion strength are 5 out of 5 bilaterally.  No clonus.    Imaging:   XR ANKLE (MIN 3 VIEWS), LEFT (CPT=73610)    Result Date: 2/1/2024  PROCEDURE: XR ANKLE (MIN 3 VIEWS), LEFT (CPT=73610)  COMPARISON: None.  INDICATIONS: Post fall today. Pain and swelling in lateral side of left ankle  TECHNIQUE: 3 views were obtained.   FINDINGS:  BONES: Mildly displaced oblique fracture of the distal fibula.  Intact tibiotalar joint and medial malleolus.  Intact posterior malleolus. SOFT TISSUES: Moderate lateral and anterior ankle soft tissue swelling. EFFUSION: None visible. OTHER: Negative.         CONCLUSION:  1. Mildly displaced oblique fracture of the distal fibula.  Intact tibiotalar joint.  Moderate lateral and anterior ankle soft tissue swelling.  Correlate clinically.    Dictated by (CST): Noe Velasquez MD on 2/01/2024 at 8:52 AM     Finalized by (CST): Noe Velasquez MD on 2/01/2024 at 8:53 AM             Labs:  Lab Results   Component Value Date    WBC 5.0 04/21/2023    HGB 12.2 04/21/2023    .0 04/21/2023      Lab Results   Component Value Date    GLU 89 05/09/2023    BUN 42 (H) 05/09/2023    CREATSERUM  1.23 (H) 05/09/2023    GFRNAA 47 (L) 04/19/2022    GFRAA 54 (L) 04/19/2022        Assessment and Plan:  Diagnoses and all orders for this visit:    Closed fracture of distal end of left fibula, unspecified fracture morphology, initial encounter        Assessment: Left distal fibula fracture, minimally displaced    Plan: This appears to be a stable isolated lateral injury.  Recommend nonoperative treatment.  Placed her in a short leg cast.  Recommend follow-up x-rays in 2 to 3 weeks out of the cast.  Would consider transitioning to a walker boot at that time.    Follow Up: Return in about 2 weeks (around 2/21/2024).    RANJANA MATTHEWS MD

## 2024-02-22 ENCOUNTER — HOSPITAL ENCOUNTER (OUTPATIENT)
Dept: GENERAL RADIOLOGY | Facility: HOSPITAL | Age: 74
Discharge: HOME | End: 2024-02-22
Attending: ORTHOPAEDIC SURGERY
Payer: MEDICARE

## 2024-02-22 ENCOUNTER — OFFICE VISIT (OUTPATIENT)
Dept: ORTHOPEDICS CLINIC | Facility: CLINIC | Age: 74
End: 2024-02-22
Payer: MEDICARE

## 2024-02-22 DIAGNOSIS — S82.832A CLOSED FRACTURE OF DISTAL END OF LEFT FIBULA, UNSPECIFIED FRACTURE MORPHOLOGY, INITIAL ENCOUNTER: Primary | ICD-10-CM

## 2024-02-22 DIAGNOSIS — S82.832D CLOSED FRACTURE OF DISTAL END OF LEFT FIBULA WITH ROUTINE HEALING, UNSPECIFIED FRACTURE MORPHOLOGY, SUBSEQUENT ENCOUNTER: ICD-10-CM

## 2024-02-22 DIAGNOSIS — S82.832A CLOSED FRACTURE OF DISTAL END OF LEFT FIBULA, UNSPECIFIED FRACTURE MORPHOLOGY, INITIAL ENCOUNTER: ICD-10-CM

## 2024-02-22 PROCEDURE — 99024 POSTOP FOLLOW-UP VISIT: CPT

## 2024-02-22 PROCEDURE — 73610 X-RAY EXAM OF ANKLE: CPT | Performed by: ORTHOPAEDIC SURGERY

## 2024-02-22 NOTE — PROGRESS NOTES
NURSING INTAKE COMMENTS:   Chief Complaint   Patient presents with    Fracture     L ankle fx f/u - Pt states pain and tingling on top of foot. Still some swelling.       HPI: This 73 year old female presents today with complaints of left ankle pain. She is about three weeks post injury from left distal fibula fracture. States she is doing okay today. She did not have any issues with the ankle while in the cast. She has been taking Tylenol, icing, and elevating the foot. She has remained nonweightbearing at this time.     Past Medical History:   Diagnosis Date    Acute congestive heart failure, unspecified heart failure type (HCC) 7/30/2021    Acute on chronic heart failure with preserved ejection fraction (HCC) 8/24/2021    Arrhythmia     Atrial fibrillation (HCC)     Chronic atrial fibrillation (HCC)     High blood pressure     High cholesterol     Hx of headache     Irregular heart rate     Mitral valve regurgitation     Murmur 7/31/2018    Murmur 7/31/2018    Unspecified essential hypertension     Vertigo      Past Surgical History:   Procedure Laterality Date    COLONOSCOPY      CYST REMOVAL      bilateral breasts    ELECTROCARDIOGRAM, COMPLETE  05-    SCANNED TO MEDIA TAB: 05-    HYSTERECTOMY      age 35    LEE LOCALIZATION WIRE 1 SITE LEFT (CPT=19281)  1998    LEE LOCALIZATION WIRE 1 SITE RIGHT (CPT=19281)  1998     Current Outpatient Medications   Medication Sig Dispense Refill    furosemide 20 MG Oral Tab Take 1 tablet (20 mg total) by mouth daily. Take additional 20mg as needed for leg swelling/wt gain 110 tablet 3    enalapril 20 MG Oral Tab Take 1 tablet (20 mg total) by mouth 2 (two) times daily. 180 tablet 1    warfarin 5 MG Oral Tab TAKE ONE TABLET 4-DAYS WEEKLY AND TAKE ONE-HALF TABLET 3-DAYS WEEKLY OR PER ANTICOAGULATION CLINIC DIRECTION 270 tablet 1    ATORVASTATIN 10 MG Oral Tab TAKE 1 TABLET BY MOUTH EVERY DAY IN THE EVENING 90 tablet 2    METOPROLOL SUCCINATE 50 MG Oral Tablet 24  Hr TAKE 1 TABLET BY MOUTH EVERY DAY (Patient taking differently: every evening.) 90 tablet 3    Calcium Carbonate (CALCIUM 500 OR) Take 1 tablet by mouth daily as needed.        Multiple Vitamin (MULTI-DAY) Oral Tab Take by mouth nightly. take 1 tablet by ORAL route  every day with food        No Known Allergies  Family History   Problem Relation Age of Onset    Pulmonary Disease Mother         COPD; Cause of death    Cancer Father         Liver cancer    Diabetes Maternal Grandmother     Breast Cancer Sister 36       Social History     Occupational History    Not on file   Tobacco Use    Smoking status: Former     Packs/day: 1.00     Years: 15.00     Additional pack years: 0.00     Total pack years: 15.00     Types: Cigarettes     Quit date: 1981     Years since quittin.0    Smokeless tobacco: Former     Quit date: 2001   Vaping Use    Vaping Use: Never used   Substance and Sexual Activity    Alcohol use: Not Currently     Comment: rare    Drug use: No    Sexual activity: Not on file        Review of Systems:  GENERAL: denies fevers, chills, night sweats, fatigue, unintentional weight loss/gain  SKIN: denies skin lesions, open sores, rash  HEENT:denies recent vision change, new nasal congestion,hearing loss, tinnitus, sore throat, headaches  RESPIRATORY: denies new shortness of breath, cough, asthma, wheezing  CARDIOVASCULAR: denies chest pain, leg cramps with exertion, palpitations, leg swelling  GI: denies abdominal pain, nausea, vomiting, diarrhea, constipation, hematochezia, worsening heartburn or stomach ulcers  : denies dysuria, hematuria, incontinence, increased frequency, urgency, difficulty urinating  MUSCULOSKELETAL: denies musculoskeletal complaints other than in HPI  NEURO: denies numbness, tingling, weakness, balance issues, dizziness, memory loss  PSYCHIATRIC: denies Hx of depression, anxiety, other psychiatric disorders  HEMATOLOGIC: denies blood clots, anemia, blood clotting  disorders, blood transfusion  ENDOCRINE: denies autoimmune disease, thyroid issues, or diabetes  ALLERGY: denies asthma, seasonal allergies    Physical Examination:    There were no vitals taken for this visit.  Constitutional: appears well hydrated, alert and responsive, no acute distress noted  Extremities: Left ankle has mild circumferential swelling. No calf tenderness or erythema. Distal radius tender to palpation. Medial malleolus slightly tender to palpation. She has limited range of motion with plantar flexion and dorsiflexion due to swelling.   Neurological: Unchanged     Imaging:   XR ANKLE (MIN 3 VIEWS), LEFT (CPT=73610)    Result Date: 2/1/2024  PROCEDURE: XR ANKLE (MIN 3 VIEWS), LEFT (CPT=73610)  COMPARISON: None.  INDICATIONS: Post fall today. Pain and swelling in lateral side of left ankle  TECHNIQUE: 3 views were obtained.   FINDINGS:  BONES: Mildly displaced oblique fracture of the distal fibula.  Intact tibiotalar joint and medial malleolus.  Intact posterior malleolus. SOFT TISSUES: Moderate lateral and anterior ankle soft tissue swelling. EFFUSION: None visible. OTHER: Negative.         CONCLUSION:  1. Mildly displaced oblique fracture of the distal fibula.  Intact tibiotalar joint.  Moderate lateral and anterior ankle soft tissue swelling.  Correlate clinically.    Dictated by (CST): Noe Velasquez MD on 2/01/2024 at 8:52 AM     Finalized by (CST): Noe Velasquez MD on 2/01/2024 at 8:53 AM             Labs:  Lab Results   Component Value Date    WBC 5.0 04/21/2023    HGB 12.2 04/21/2023    .0 04/21/2023      Lab Results   Component Value Date    GLU 89 05/09/2023    BUN 42 (H) 05/09/2023    CREATSERUM 1.23 (H) 05/09/2023    GFRNAA 47 (L) 04/19/2022    GFRAA 54 (L) 04/19/2022        Assessment and Plan:  Diagnoses and all orders for this visit:    Closed fracture of distal end of left fibula, unspecified fracture morphology, initial encounter  -     XR ANKLE (MIN 3 VIEWS), LEFT  (CPT=73610); Future  -     DME - External    Closed fracture of distal end of left fibula with routine healing, unspecified fracture morphology, subsequent encounter        Assessment: Left distal fibula fracture, minimally displaced     Plan: Short leg cast removed today and patient placed in splint. DME order for CAM walker boot provided today. Patient may weight bear as tolerated once she has walker boot. Advised icing, elevating, and oral anti-inflammatories as needed for pain. Accompanied by . Follow up in two weeks with new x-rays of the left ankle.     Follow Up: Return in about 2 weeks (around 3/7/2024).    Melony Roe PA-C

## 2024-02-29 ENCOUNTER — TELEPHONE (OUTPATIENT)
Dept: ORTHOPEDICS CLINIC | Facility: CLINIC | Age: 74
End: 2024-02-29

## 2024-02-29 NOTE — TELEPHONE ENCOUNTER
Per [facility] needs standard written order for pt, states they have faxed over 3 times for signature to [phone], will be faxing to [phone] today, please fax back signed. Thank you.

## 2024-03-04 ENCOUNTER — APPOINTMENT (OUTPATIENT)
Dept: CARDIOLOGY | Age: 74
End: 2024-03-04

## 2024-03-07 ENCOUNTER — HOSPITAL ENCOUNTER (OUTPATIENT)
Dept: GENERAL RADIOLOGY | Facility: HOSPITAL | Age: 74
Discharge: HOME | End: 2024-03-07
Payer: MEDICARE

## 2024-03-07 ENCOUNTER — EXTERNAL LAB (OUTPATIENT)
Dept: HEALTH INFORMATION MANAGEMENT | Facility: OTHER | Age: 74
End: 2024-03-07

## 2024-03-07 ENCOUNTER — OFFICE VISIT (OUTPATIENT)
Dept: ORTHOPEDICS CLINIC | Facility: CLINIC | Age: 74
End: 2024-03-07
Payer: MEDICARE

## 2024-03-07 DIAGNOSIS — S82.832A CLOSED FRACTURE OF DISTAL END OF LEFT FIBULA, UNSPECIFIED FRACTURE MORPHOLOGY, INITIAL ENCOUNTER: Primary | ICD-10-CM

## 2024-03-07 DIAGNOSIS — S82.832D CLOSED FRACTURE OF DISTAL END OF LEFT FIBULA WITH ROUTINE HEALING, UNSPECIFIED FRACTURE MORPHOLOGY, SUBSEQUENT ENCOUNTER: ICD-10-CM

## 2024-03-07 DIAGNOSIS — S82.832A CLOSED FRACTURE OF DISTAL END OF LEFT FIBULA, UNSPECIFIED FRACTURE MORPHOLOGY, INITIAL ENCOUNTER: ICD-10-CM

## 2024-03-07 LAB
BUN SERPL-MCNC: 38 MG/DL (ref 6–20)
CALCIUM SERPL-MCNC: 9.6 MG/DL (ref 8.6–10.3)
CHLORIDE SERPL-SCNC: 102 MMOL/L (ref 98–107)
CO2 SERPL-SCNC: 26.6 MMOL/L (ref 22–29)
CREAT SERPL-MCNC: 1.62 MG/DL (ref 0.5–0.9)
GFR SERPLBLD SCHWARTZ-ARVRAT: ABNORMAL ML/MIN/1.73
GLUCOSE SERPL-MCNC: 93 MG/DL (ref 74–109)
LENGTH OF FAST TIME PATIENT: YES H
POTASSIUM SERPL-SCNC: 4.8 MMOL/L (ref 3.5–5.2)
SODIUM SERPL-SCNC: 141 MMOL/L (ref 136–145)

## 2024-03-07 PROCEDURE — 73610 X-RAY EXAM OF ANKLE: CPT

## 2024-03-07 PROCEDURE — 99213 OFFICE O/P EST LOW 20 MIN: CPT

## 2024-03-07 NOTE — PROGRESS NOTES
NURSING INTAKE COMMENTS:   Chief Complaint   Patient presents with    Fracture     L ankle fx f/u -  Pt is healing  - Pain mostly at night - Some swelling.        HPI: This 73 year old female presents today with complaints of left ankle follow up. She is five weeks post injury form left distal fibula fracture. States she is doing well today. She has been experiencing occasional pain at night in the ankle. Taking Tylenol as needed for pain and icing the ankle as needed. She has been weightbearing as tolerated in the walker boot at this time.     Past Medical History:   Diagnosis Date    Acute congestive heart failure, unspecified heart failure type (HCC) 7/30/2021    Acute on chronic heart failure with preserved ejection fraction (HCC) 8/24/2021    Arrhythmia     Atrial fibrillation (HCC)     Chronic atrial fibrillation (HCC)     High blood pressure     High cholesterol     Hx of headache     Irregular heart rate     Mitral valve regurgitation     Murmur 7/31/2018    Murmur 7/31/2018    Unspecified essential hypertension     Vertigo      Past Surgical History:   Procedure Laterality Date    COLONOSCOPY      CYST REMOVAL      bilateral breasts    ELECTROCARDIOGRAM, COMPLETE  05-    SCANNED TO MEDIA TAB: 05-    HYSTERECTOMY      age 35    LEE LOCALIZATION WIRE 1 SITE LEFT (CPT=19281)  1998    LEE LOCALIZATION WIRE 1 SITE RIGHT (CPT=19281)  1998     Current Outpatient Medications   Medication Sig Dispense Refill    furosemide 20 MG Oral Tab Take 1 tablet (20 mg total) by mouth daily. Take additional 20mg as needed for leg swelling/wt gain 110 tablet 3    enalapril 20 MG Oral Tab Take 1 tablet (20 mg total) by mouth 2 (two) times daily. 180 tablet 1    warfarin 5 MG Oral Tab TAKE ONE TABLET 4-DAYS WEEKLY AND TAKE ONE-HALF TABLET 3-DAYS WEEKLY OR PER ANTICOAGULATION CLINIC DIRECTION 270 tablet 1    ATORVASTATIN 10 MG Oral Tab TAKE 1 TABLET BY MOUTH EVERY DAY IN THE EVENING 90 tablet 2    METOPROLOL SUCCINATE  50 MG Oral Tablet 24 Hr TAKE 1 TABLET BY MOUTH EVERY DAY (Patient taking differently: every evening.) 90 tablet 3    Calcium Carbonate (CALCIUM 500 OR) Take 1 tablet by mouth daily as needed.        Multiple Vitamin (MULTI-DAY) Oral Tab Take by mouth nightly. take 1 tablet by ORAL route  every day with food        No Known Allergies  Family History   Problem Relation Age of Onset    Pulmonary Disease Mother         COPD; Cause of death    Cancer Father         Liver cancer    Diabetes Maternal Grandmother     Breast Cancer Sister 36       Social History     Occupational History    Not on file   Tobacco Use    Smoking status: Former     Packs/day: 1.00     Years: 15.00     Additional pack years: 0.00     Total pack years: 15.00     Types: Cigarettes     Quit date: 1981     Years since quittin.1    Smokeless tobacco: Former     Quit date: 2001   Vaping Use    Vaping Use: Never used   Substance and Sexual Activity    Alcohol use: Not Currently     Comment: rare    Drug use: No    Sexual activity: Not on file        Review of Systems:  GENERAL: denies fevers, chills, night sweats, fatigue, unintentional weight loss/gain  SKIN: denies skin lesions, open sores, rash  HEENT:denies recent vision change, new nasal congestion,hearing loss, tinnitus, sore throat, headaches  RESPIRATORY: denies new shortness of breath, cough, asthma, wheezing  CARDIOVASCULAR: denies chest pain, leg cramps with exertion, palpitations, leg swelling  GI: denies abdominal pain, nausea, vomiting, diarrhea, constipation, hematochezia, worsening heartburn or stomach ulcers  : denies dysuria, hematuria, incontinence, increased frequency, urgency, difficulty urinating  MUSCULOSKELETAL: denies musculoskeletal complaints other than in HPI  NEURO: denies numbness, tingling, weakness, balance issues, dizziness, memory loss  PSYCHIATRIC: denies Hx of depression, anxiety, other psychiatric disorders  HEMATOLOGIC: denies blood clots, anemia,  blood clotting disorders, blood transfusion  ENDOCRINE: denies autoimmune disease, thyroid issues, or diabetes  ALLERGY: denies asthma, seasonal allergies    Physical Examination:    There were no vitals taken for this visit.  Constitutional: appears well hydrated, alert and responsive, no acute distress noted  Extremities: Left distal fibula is mildly tender to palpation. Mild circumferential swelling in the left ankle. Medial malleolus is nontender. Limited range of motion with plantarflexion and dorsiflexion. No numbness.   Neurological: Unchanged     Imaging:   XR ANKLE (MIN 3 VIEWS), LEFT (CPT=73610)    Result Date: 2/22/2024  PROCEDURE: XR ANKLE (MIN 3 VIEWS), LEFT (CPT=73610)  COMPARISON: [facility], XR ANKLE (MIN 3 VIEWS), LEFT (CPT=73610), 2/01/2024, 8:38 AM.  INDICATIONS: Follow-up for closed fracture of distal end of left fibula, unspecified fracture morphology, initial encounter  TECHNIQUE: 3 views were obtained.   FINDINGS:  BONES: Again visualized is a transverse fracture of the distal fibula.  There is callus formation with resorption of the fracture line.  No definite bony bridging. Well-corticated osseous density is seen adjacent to the medial malleolus, likely sequela of remote trauma. The ankle mortise is maintained. There is a small enthesophyte at the insertion of the Achilles tendon.  Mild narrowing of the talonavicular joint with subchondral cysts. SOFT TISSUES: Lateral soft tissue swelling. EFFUSION: None visible. OTHER: Negative.         CONCLUSION:   Healing distal fibular fracture.  Mild talonavicular joint osteoarthritis.  Soft tissue swelling.     Dictated by (CST): Luis Angel Duff MD on 2/22/2024 at 8:58 PM     Finalized by (CST): Luis Angel Duff MD on 2/22/2024 at 8:59 PM             Labs:  Lab Results   Component Value Date    WBC 5.0 04/21/2023    HGB 12.2 04/21/2023    .0 04/21/2023      Lab Results   Component Value Date    GLU 89 05/09/2023    BUN 42 (H)  05/09/2023    CREATSERUM 1.23 (H) 05/09/2023    GFRNAA 47 (L) 04/19/2022    GFRAA 54 (L) 04/19/2022        Assessment and Plan:  Diagnoses and all orders for this visit:    Closed fracture of distal end of left fibula, unspecified fracture morphology, initial encounter  -     XR ANKLE (MIN 3 VIEWS), LEFT (CPT=73610); Future  -     PHYSICAL THERAPY - INTERNAL    Closed fracture of distal end of left fibula with routine healing, unspecified fracture morphology, subsequent encounter        Assessment: Healing left distal fibula fracture, minimally displaced     Plan: Patient may continue weight bearing as tolerated in the boot at this time. She may discontinue the walker boot when sleeping at night. Advised icing and oral anti-inflammatories as needed for pain. Encouraged active range of motion of the ankle while at home. Physical therapy referral provided today. Accompanied by . Follow up in three weeks with x-rays.     Follow Up: Return in about 3 weeks (around 3/28/2024).    Mleony Roe PA-C

## 2024-03-08 NOTE — TELEPHONE ENCOUNTER
Per [facility] clinic calling again, is aware provider was in on 3/6 and 3/7, asking if 2/22 order for cam boot to be faxed, please fax to [phone]. Thank you.

## 2024-04-03 ENCOUNTER — HOSPITAL ENCOUNTER (OUTPATIENT)
Dept: GENERAL RADIOLOGY | Facility: HOSPITAL | Age: 74
Discharge: HOME | End: 2024-04-03
Payer: MEDICARE

## 2024-04-03 ENCOUNTER — OFFICE VISIT (OUTPATIENT)
Dept: ORTHOPEDICS CLINIC | Facility: CLINIC | Age: 74
End: 2024-04-03
Payer: MEDICARE

## 2024-04-03 DIAGNOSIS — S82.832A CLOSED FRACTURE OF DISTAL END OF LEFT FIBULA, UNSPECIFIED FRACTURE MORPHOLOGY, INITIAL ENCOUNTER: ICD-10-CM

## 2024-04-03 DIAGNOSIS — S82.832D CLOSED FRACTURE OF DISTAL END OF LEFT FIBULA WITH ROUTINE HEALING, UNSPECIFIED FRACTURE MORPHOLOGY, SUBSEQUENT ENCOUNTER: ICD-10-CM

## 2024-04-03 DIAGNOSIS — S82.832A CLOSED FRACTURE OF DISTAL END OF LEFT FIBULA, UNSPECIFIED FRACTURE MORPHOLOGY, INITIAL ENCOUNTER: Primary | ICD-10-CM

## 2024-04-03 PROCEDURE — 73610 X-RAY EXAM OF ANKLE: CPT

## 2024-04-03 PROCEDURE — 99024 POSTOP FOLLOW-UP VISIT: CPT

## 2024-04-03 NOTE — PROGRESS NOTES
NURSING INTAKE COMMENTS:   Chief Complaint   Patient presents with    Follow - Up     Left ankle fracture. Patient denies any pain at this time        HPI: This 73 year old female presents today with complaints of left ankle follow up. She is 9 weeks post injury from left distal fibula fracture. States she is doing well today. She is not currently in any pain. States she gets an occasional pain in the ankle when walking. She has not started physical therapy at this time and has continued wearing walker boot.     Past Medical History:   Diagnosis Date    Acute congestive heart failure, unspecified heart failure type (HCC) 7/30/2021    Acute on chronic heart failure with preserved ejection fraction (HCC) 8/24/2021    Arrhythmia     Atrial fibrillation (HCC)     Chronic atrial fibrillation (HCC)     High blood pressure     High cholesterol     Hx of headache     Irregular heart rate     Mitral valve regurgitation     Murmur 7/31/2018    Murmur 7/31/2018    Unspecified essential hypertension     Vertigo      Past Surgical History:   Procedure Laterality Date    COLONOSCOPY      CYST REMOVAL      bilateral breasts    ELECTROCARDIOGRAM, COMPLETE  05-    SCANNED TO MEDIA TAB: 05-    HYSTERECTOMY      age 35    LEE LOCALIZATION WIRE 1 SITE LEFT (CPT=19281)  1998    LEE LOCALIZATION WIRE 1 SITE RIGHT (CPT=19281)  1998     Current Outpatient Medications   Medication Sig Dispense Refill    furosemide 20 MG Oral Tab Take 1 tablet (20 mg total) by mouth daily. Take additional 20mg as needed for leg swelling/wt gain 110 tablet 3    enalapril 20 MG Oral Tab Take 1 tablet (20 mg total) by mouth 2 (two) times daily. 180 tablet 1    warfarin 5 MG Oral Tab TAKE ONE TABLET 4-DAYS WEEKLY AND TAKE ONE-HALF TABLET 3-DAYS WEEKLY OR PER ANTICOAGULATION CLINIC DIRECTION 270 tablet 1    ATORVASTATIN 10 MG Oral Tab TAKE 1 TABLET BY MOUTH EVERY DAY IN THE EVENING 90 tablet 2    METOPROLOL SUCCINATE 50 MG Oral Tablet 24 Hr TAKE 1  TABLET BY MOUTH EVERY DAY (Patient taking differently: every evening.) 90 tablet 3    Calcium Carbonate (CALCIUM 500 OR) Take 1 tablet by mouth daily as needed.        Multiple Vitamin (MULTI-DAY) Oral Tab Take by mouth nightly. take 1 tablet by ORAL route  every day with food        No Known Allergies  Family History   Problem Relation Age of Onset    Pulmonary Disease Mother         COPD; Cause of death    Cancer Father         Liver cancer    Diabetes Maternal Grandmother     Breast Cancer Sister 36       Social History     Occupational History    Not on file   Tobacco Use    Smoking status: Former     Packs/day: 1.00     Years: 15.00     Additional pack years: 0.00     Total pack years: 15.00     Types: Cigarettes     Quit date: 1981     Years since quittin.2    Smokeless tobacco: Former     Quit date: 2001   Vaping Use    Vaping Use: Never used   Substance and Sexual Activity    Alcohol use: Not Currently     Comment: rare    Drug use: No    Sexual activity: Not on file        Review of Systems:  GENERAL: denies fevers, chills, night sweats, fatigue, unintentional weight loss/gain  SKIN: denies skin lesions, open sores, rash  HEENT:denies recent vision change, new nasal congestion,hearing loss, tinnitus, sore throat, headaches  RESPIRATORY: denies new shortness of breath, cough, asthma, wheezing  CARDIOVASCULAR: denies chest pain, leg cramps with exertion, palpitations, leg swelling  GI: denies abdominal pain, nausea, vomiting, diarrhea, constipation, hematochezia, worsening heartburn or stomach ulcers  : denies dysuria, hematuria, incontinence, increased frequency, urgency, difficulty urinating  MUSCULOSKELETAL: denies musculoskeletal complaints other than in HPI  NEURO: denies numbness, tingling, weakness, balance issues, dizziness, memory loss  PSYCHIATRIC: denies Hx of depression, anxiety, other psychiatric disorders  HEMATOLOGIC: denies blood clots, anemia, blood clotting disorders, blood  transfusion  ENDOCRINE: denies autoimmune disease, thyroid issues, or diabetes  ALLERGY: denies asthma, seasonal allergies    Physical Examination:    There were no vitals taken for this visit.  Constitutional: appears well hydrated, alert and responsive, no acute distress noted  Extremities: Left distal fibula is minimally tender to palpation. Medial malleolus is nontender. Minimal circumferential swelling. Range of motion has not improved much since last seen in office. No numbness.   Neurological: Unchanged 2    Imaging:   XR ANKLE (MIN 3 VIEWS), LEFT (CPT=73610)    Result Date: 3/7/2024         PROCEDURE: XR ANKLE (MIN 3 VIEWS), LEFT (CPT=73610)  COMPARISON: [facility] 2nd Floor, XR ANKLE (MIN 3 VIEWS), LEFT (CPT=73610), 2/22/2024, 2:20 PM.  INDICATIONS: Follow-up for left ankle fracture.  TECHNIQUE: Three views Findings and impression:  Progressive callus at the minimally displaced distal fibular fracture.  Fracture line is still visible.  Normal ankle alignment     Dictated by (CST): Lawrence Flores MD on 3/07/2024 at 12:29 PM     Finalized by (CST): Lawrence Flores MD on 3/07/2024 at 12:29 PM             Labs:  Lab Results   Component Value Date    WBC 5.0 04/21/2023    HGB 12.2 04/21/2023    .0 04/21/2023      Lab Results   Component Value Date    GLU 89 05/09/2023    BUN 42 (H) 05/09/2023    CREATSERUM 1.23 (H) 05/09/2023    GFRNAA 47 (L) 04/19/2022    GFRAA 54 (L) 04/19/2022        Assessment and Plan:  Diagnoses and all orders for this visit:    Closed fracture of distal end of left fibula, unspecified fracture morphology, initial encounter  -     XR ANKLE (MIN 3 VIEWS), LEFT (CPT=73610); Future    Closed fracture of distal end of left fibula with routine healing, unspecified fracture morphology, subsequent encounter        Assessment: Healing left distal fibula fracture     Plan: I recommend discontinuation of the boot at this time. Encourage patient to continue pushing  herself to increase her range of motion with the ankle. Advised her she needs to start physical therapy this week for the ankle. Advised icing and oral anti-inflammatories as needed for pain. Accompanied by .    Follow up in four weeks with new x-rays     Follow Up: Return in about 4 weeks (around 5/1/2024).    Melony Roe PA-C

## 2024-04-08 ENCOUNTER — OFFICE VISIT (OUTPATIENT)
Dept: PHYSICAL THERAPY | Age: 74
End: 2024-04-08
Payer: MEDICARE

## 2024-04-08 DIAGNOSIS — S82.832A CLOSED FRACTURE OF DISTAL END OF LEFT FIBULA, UNSPECIFIED FRACTURE MORPHOLOGY, INITIAL ENCOUNTER: Primary | ICD-10-CM

## 2024-04-08 PROCEDURE — 97530 THERAPEUTIC ACTIVITIES: CPT

## 2024-04-08 PROCEDURE — 97163 PT EVAL HIGH COMPLEX 45 MIN: CPT

## 2024-04-08 PROCEDURE — 97140 MANUAL THERAPY 1/> REGIONS: CPT

## 2024-04-08 NOTE — PROGRESS NOTES
LOWER EXTREMITY EVALUATION:     Diagnosis:   Closed fracture of distal end of left fibula, unspecified fracture morphology, initial encounter (S82.832A)          Referring Provider: Melony Roe  Date of Evaluation:    4/8/2024    Precautions:   cardiac conditions Next MD visit:   none scheduled  Date of Surgery: n/a     PATIENT SUMMARY   Colleen Campos is a 73 year old female who presents to therapy today with 9 weeks post s/p (L) distal fibula fracture ankle s/p fall on her deck on 02/01/2024.  She is not currently in any pain. States she gets an occasional pain in the ankle when walking. She was wearing a cast for a month  followed by boot for 6 weeks and boot was off last week.  Pt describes pain level current 1/10, at best 1/10, at worst 2/10.   Current functional limitations include standing , walking , stairs, sit to stand.     Colleen describes prior level of function Independant with mobility . Pt goals include to be able to stand, walk, use stairs without any pain or tightness.  Past medical history was reviewed with Colleen. Significant findings include : Acute CHF, A - fib, HTN, Mitral valve regurgitation, Vertigo      ASSESSMENT  Colleen presents to physical therapy evaluation with primary c/o pain and limited mobility and weakness in (L) ankle s/p fracture. The results of the objective tests and measures show limited mobility , deficits in muscle strength, edema, compensatory gait , fall risks..  Functional deficits include but are not limited to standing , walking , using stairs, bed mobility.  Signs and symptoms are consistent with diagnosis of gait abnormailitie and ankle pain s/p fracture. Pt and PT discussed evaluation findings, pathology, POC and HEP.  Pt voiced understanding and performs HEP correctly without reported pain. Skilled Physical Therapy is medically necessary to address the above impairments and reach functional goals.     OBJECTIVE:   Observation: non pitting edema around the  joint  Palpation: tenderness in the lateral posterior aspect of the ankle  Sensation: intact    AROM: (* denotes performed with pain)  Foot/Ankle   DF: R 15 deg; L 12 deg  PF: R  35 deg; L 8 deg   INV: R WFL; L 7-8 deg  EV: R WFL; L 2-3 deg  Great toe ext: R WFL; L 4-5 deg     Accessory motion: limping on (L)    Flexibility:  Hip Flexor: R WFL, L Mod tightness  Hamstrings: R 105 ; L 93 deg   Quads: (B) mod tightness;  Gastroc-soleus:; (B) :  mod tightness    Strength/MMT: (* denotes performed with pain)  Foot/Ankle   DF: R 5/5; L 3+/5  PF: R 5/5; L 3+/5  INV: R 5/5; L 4/5  EV: R 5/5; L 4/5  Great toe ext: R 5/5; L 4/5       Gait: pt ambulates on level ground with decreased step length  , decreased stance phase  , decreased foot clearance  , stooped posture/forward lean, and difficulty turning  Balance: SLS: R 3-4  sec, L 3-4 sec  5 x sit to stand test;  13 sec with (B) UE assist on chair  TU sec    Today’s Treatment and Response:   Pt education was provided on exam findings, treatment diagnosis, treatment plan, expectations, and prognosis. Pt was also provided recommendations for activity modifications, possible soreness after evaluation, postural corrections, ergonomics, pain science education , detrimental fear avoidance behaviors, importance of remaining active, and strategies to reduce fall risk at home.  Patient was instructed in and issued a HEP for: Ankle mobility , PF stretches, toe crunches, elevation and cold pack application to decrease edema    Charges: PT Eval High Complexity, MT:1; TA:2      Total Timed Treatment: 45 min     Total Treatment Time: 45 min     Based on clinical rationale and outcome measures, this evaluation involved High Complexity decision making due to 3+ personal factors/comorbidities, 3 body structures involved/activity limitations, and unstable symptoms including changing pain levels.  PLAN OF CARE:    Goals: (to be met in 18 visits)  Pt will demonstrate improved DF AROM to >10  degrees to promote proper foot clearance during gait and greater ease descending stairs without compensation  Pt will have increased ankle strength to 5/5 throughout for improved ankle control with ADLs such as prolonged gait and stair negotiation with alt leg pattern  Pt will have improved SLS to >13 s for increased ankle stability with ambulation on uneven surfaces such as gravel and grass   Pt will report <2/10 pain with  standing for >60 minutes for light household chores   Pt will be independent and compliant with comprehensive HEP to maintain progress achieved in PT  Pt will normal gait mechanics with >300 ft to navigate community distances safely    Frequency / Duration: Patient will be seen for 2-3 x/week for 6 weeks.   x/week or a total of 18 visits over a 90 day period. Treatment will include: Gait training, Manual Therapy, Neuromuscular Re-education, Self-Care Home Management, Therapeutic Activities, Therapeutic Exercise, and Home Exercise Program instruction    Education or treatment limitation: None  Rehab Potential:good    LEFS Score  LEFS Score: 86.25 % (4/7/2024 12:58 PM)      Patient/Family/Caregiver was advised of these findings, precautions, and treatment options and has agreed to actively participate in planning and for this course of care.    Thank you for your referral. Please co-sign or sign and return this letter via fax as soon as possible to [phone]. If you have any questions, please contact me at Dept: [phone]    Sincerely,  Electronically signed by therapist: Amanda Graham, PT  Physician's certification required: Yes  I certify the need for these services furnished under this plan of treatment and while under my care.    X___________________________________________________ Date____________________    Certification From: 4/8/2024  To:7/7/2024

## 2024-04-09 ENCOUNTER — APPOINTMENT (OUTPATIENT)
Dept: CARDIOLOGY | Age: 74
End: 2024-04-09
Attending: INTERNAL MEDICINE

## 2024-04-09 DIAGNOSIS — I42.2 HYPERTROPHIC CARDIOMYOPATHY  (CMD): ICD-10-CM

## 2024-04-09 LAB
AV STENOSIS SEVERITY TEXT: NORMAL
AVI LVOT PEAK GRADIENT (LVOTMG): 1.1
LEFT INTERNAL DIMENSION IN SYSTOLE (LVSD): 1.4
LEFT VENTRICULAR INTERNAL DIMENSION IN DIASTOLE (LVDD): 2.6
LEFT VENTRICULAR POSTERIOR WALL IN END DIASTOLE (LVPW): 3.9
LV EF: NORMAL %
RV END SYSTOLIC LONGITUDINAL STRAIN FREE WALL (RVGS): 2.1

## 2024-04-09 PROCEDURE — 93321 DOPPLER ECHO F-UP/LMTD STD: CPT | Performed by: INTERNAL MEDICINE

## 2024-04-09 PROCEDURE — 93308 TTE F-UP OR LMTD: CPT | Performed by: INTERNAL MEDICINE

## 2024-04-09 PROCEDURE — 93325 DOPPLER ECHO COLOR FLOW MAPG: CPT | Performed by: INTERNAL MEDICINE

## 2024-04-10 ENCOUNTER — OFFICE VISIT (OUTPATIENT)
Dept: PHYSICAL THERAPY | Age: 74
End: 2024-04-10
Payer: MEDICARE

## 2024-04-10 PROCEDURE — 97112 NEUROMUSCULAR REEDUCATION: CPT

## 2024-04-10 PROCEDURE — 97110 THERAPEUTIC EXERCISES: CPT

## 2024-04-10 PROCEDURE — 97140 MANUAL THERAPY 1/> REGIONS: CPT

## 2024-04-10 NOTE — PROGRESS NOTES
Certification From: 2024  To:2024      Diagnosis:   Closed fracture of distal end of left fibula, unspecified fracture morphology, initial encounter (S82.159F)      Referring Provider: Melony Roe  Date of Evaluation:    2024    Precautions:  None Next MD visit:   none scheduled  Date of Surgery: n/a   Insurance Primary/Secondary: MEDICARE / COMMERCIAL     # Auth Visits: 18            Subjective: Reports being able to walk around 2 hours yesterday in the store . Pain of 3-4/10 felt with tightness and soreness at the back of her ankle.    Pain: 1-2/10 pain felt in (L) ankle       Objective:   Observation: non pitting edema around the joint  Palpation: tenderness in the lateral posterior aspect of the ankle  Sensation: intact    AROM: (* denotes performed with pain)  Foot/Ankle   DF: R 15 deg; L 12 deg  PF: R  35 deg; L 8 deg   INV: R WFL; L 7-8 deg  EV: R WFL; L 2-3 deg  Great toe ext: R WFL; L 4-5 deg     Accessory motion: limping on (L)    Flexibility:  Hip Flexor: R WFL, L Mod tightness  Hamstrings: R 105 ; L 93 deg   Quads: (B) mod tightness;  Gastroc-soleus:; (B) :  mod tightness    Strength/MMT: (* denotes performed with pain)  Foot/Ankle   DF: R 5/5; L 3+/5  PF: R 5/5; L 3+/5  INV: R 5/5; L 4/5  EV: R 5/5; L 4/5  Great toe ext: R 5/5; L 4/5       Gait: pt ambulates on level ground with decreased step length , decreased stance phase , decreased foot clearance , stooped posture/forward lean, and difficulty turning  Balance: SLS: R 3-4  sec, L 3-4 sec  5 x sit to stand test;  13 sec with (B) UE assist on chair  TU sec      Assessment: Initited Ankle AROM exercises to loosen up tight soft tissue structures around the (L) ankle . Progressed to strengthening exercises with isometrics, BAPs board, 3 way stepping for balance and neuro muscular reeducation. Passive Ankle / foot stretches and metatarsal  mobilization performed to improve Ankle joint mobility and MLD performed to address edema.  Tenderness positive in the lateral posterior aspect of Ankle with manual therapy.         Goals: (to be met in 18 visits)  Pt will demonstrate improved DF AROM to >10 degrees to promote proper foot clearance during gait and greater ease descending stairs without compensation  Pt will have increased ankle strength to 5/5 throughout for improved ankle control with ADLs such as prolonged gait and stair negotiation with alt leg pattern  Pt will have improved SLS to >13 s for increased ankle stability with ambulation on uneven surfaces such as gravel and grass   Pt will report <2/10 pain with  standing for >60 minutes for light household chores   Pt will be independent and compliant with comprehensive HEP to maintain progress achieved in PT  Pt will normal gait mechanics with >300 ft to navigate community distances safely    Plan:Patient will be seen for 2-3 x/week for 6 weeks.   x/week or a total of 18 visits over a 90 day period. Gait training, Manual Therapy, Neuromuscular Re-education, Self-Care Home Management, Therapeutic Activities, Therapeutic Exercise, and Home Exercise Program instruction  Date: 4/10/2024  TX#: 2/18 Date:                 TX#: 3/ Date:                 TX#: 4/ Date:                 TX#: 5/ Date:   Tx#: 6/   MT: 12 minutes  PROM (LP Ankle'passive strteches DF/PF/ EVER/INVER:   Metartsal mobs;        NMES: 13 minutes  3 way gait ; 12 steps x 4   BAPS; 5 minutes       TE: 22 minutes  ANKLE AROM : 2-3 minutes  4 way isometrics: 10 x 1; 5 sec with PT  Towel crunches; 25 x 1; 5 sec hold  Wind shield with towel ; 25 x 1  Ankle circles; 25 x 1 each         HEP:  Ankle mobility , PF stretches, toe crunches, elevation and cold pack application to decrease edema  LEFS Score: 86.25 % (4/7/2024 12:58 PM)    Charges: NMES: 1; MT: 1; TE: 1       Total Timed Treatment: 45 min  Total Treatment Time: 45 min

## 2024-04-12 ENCOUNTER — TELEPHONE (OUTPATIENT)
Dept: CARDIOLOGY | Age: 74
End: 2024-04-12

## 2024-04-12 DIAGNOSIS — I50.32 CHRONIC HEART FAILURE WITH PRESERVED EJECTION FRACTION (CMD): Primary | ICD-10-CM

## 2024-04-12 DIAGNOSIS — I42.2 HYPERTROPHIC CARDIOMYOPATHY (CMD): ICD-10-CM

## 2024-04-12 RX ORDER — MAVACAMTEN 5 MG/1
5 CAPSULE, GELATIN COATED ORAL DAILY
Qty: 30 CAPSULE | Refills: 11 | Status: SHIPPED | OUTPATIENT
Start: 2024-04-12

## 2024-04-15 ENCOUNTER — APPOINTMENT (OUTPATIENT)
Dept: PHYSICAL THERAPY | Age: 74
End: 2024-04-15
Payer: MEDICARE

## 2024-04-15 ENCOUNTER — APPOINTMENT (OUTPATIENT)
Dept: CARDIOLOGY | Age: 74
End: 2024-04-15

## 2024-04-15 VITALS
WEIGHT: 140.21 LBS | RESPIRATION RATE: 18 BRPM | BODY MASS INDEX: 24.84 KG/M2 | DIASTOLIC BLOOD PRESSURE: 67 MMHG | HEIGHT: 63 IN | SYSTOLIC BLOOD PRESSURE: 132 MMHG | HEART RATE: 69 BPM

## 2024-04-15 DIAGNOSIS — E55.9 VITAMIN D DEFICIENCY: ICD-10-CM

## 2024-04-15 DIAGNOSIS — I42.2 HYPERTROPHIC CARDIOMYOPATHY (CMD): Primary | ICD-10-CM

## 2024-04-15 DIAGNOSIS — E78.5 DYSLIPIDEMIA: ICD-10-CM

## 2024-04-15 DIAGNOSIS — I50.32 CHRONIC HEART FAILURE WITH PRESERVED EJECTION FRACTION (CMD): ICD-10-CM

## 2024-04-15 PROCEDURE — 99215 OFFICE O/P EST HI 40 MIN: CPT | Performed by: INTERNAL MEDICINE

## 2024-04-15 RX ORDER — FUROSEMIDE 20 MG/1
20 TABLET ORAL EVERY OTHER DAY
Qty: 45 TABLET | Refills: 3 | Status: SHIPPED | OUTPATIENT
Start: 2024-04-15

## 2024-04-15 RX ORDER — MAVACAMTEN 10 MG/1
10 CAPSULE, GELATIN COATED ORAL DAILY
Qty: 30 CAPSULE | Refills: 11 | Status: SHIPPED | OUTPATIENT
Start: 2024-04-15

## 2024-04-15 SDOH — HEALTH STABILITY: PHYSICAL HEALTH: ON AVERAGE, HOW MANY MINUTES DO YOU ENGAGE IN EXERCISE AT THIS LEVEL?: 0 MIN

## 2024-04-15 SDOH — HEALTH STABILITY: PHYSICAL HEALTH: ON AVERAGE, HOW MANY DAYS PER WEEK DO YOU ENGAGE IN MODERATE TO STRENUOUS EXERCISE (LIKE A BRISK WALK)?: 0 DAYS

## 2024-04-15 ASSESSMENT — ENCOUNTER SYMPTOMS
HEADACHES: 0
DEPRESSION: 0
ABDOMINAL PAIN: 0
BLOATING: 0
DIZZINESS: 0
ANOREXIA: 0
SHORTNESS OF BREATH: 0
WEIGHT GAIN: 1
INSOMNIA: 0
LIGHT-HEADEDNESS: 0
WEIGHT LOSS: 0
LOSS OF BALANCE: 1

## 2024-04-15 ASSESSMENT — PATIENT HEALTH QUESTIONNAIRE - PHQ9
1. LITTLE INTEREST OR PLEASURE IN DOING THINGS: NOT AT ALL
CLINICAL INTERPRETATION OF PHQ2 SCORE: NO FURTHER SCREENING NEEDED
SUM OF ALL RESPONSES TO PHQ9 QUESTIONS 1 AND 2: 0
2. FEELING DOWN, DEPRESSED OR HOPELESS: NOT AT ALL
SUM OF ALL RESPONSES TO PHQ9 QUESTIONS 1 AND 2: 0

## 2024-04-16 ENCOUNTER — MED REC SCAN ONLY (OUTPATIENT)
Dept: INTERNAL MEDICINE CLINIC | Facility: CLINIC | Age: 74
End: 2024-04-16

## 2024-04-17 ENCOUNTER — APPOINTMENT (OUTPATIENT)
Dept: PHYSICAL THERAPY | Age: 74
End: 2024-04-17
Payer: MEDICARE

## 2024-04-19 ENCOUNTER — TELEPHONE (OUTPATIENT)
Dept: CARDIOLOGY | Age: 74
End: 2024-04-19

## 2024-04-19 DIAGNOSIS — I42.2 HYPERTROPHIC CARDIOMYOPATHY (CMD): Primary | ICD-10-CM

## 2024-04-22 ENCOUNTER — OFFICE VISIT (OUTPATIENT)
Dept: PHYSICAL THERAPY | Age: 74
End: 2024-04-22
Payer: MEDICARE

## 2024-04-22 PROCEDURE — 97110 THERAPEUTIC EXERCISES: CPT

## 2024-04-22 PROCEDURE — 97112 NEUROMUSCULAR REEDUCATION: CPT

## 2024-04-22 PROCEDURE — 97140 MANUAL THERAPY 1/> REGIONS: CPT

## 2024-04-22 NOTE — PROGRESS NOTES
Certification From: 2024  To:2024      Diagnosis:   Closed fracture of distal end of left fibula, unspecified fracture morphology, initial encounter (S82.759Q)      Referring Provider: Melony Roe  Date of Evaluation:    2024    Precautions:  None Next MD visit:   none scheduled  Date of Surgery: n/a   Insurance Primary/Secondary: MEDICARE / COMMERCIAL     # Auth Visits: 18            Subjective: Reports being able to walk around 2 hours yesterday in the store . Pain of 3-4/10 felt with tightness and soreness at the back of her ankle.    Pain: 1-2/10 pain felt in (L) ankle       Objective:   Observation: non pitting edema around the joint  Palpation: tenderness in the lateral posterior aspect of the ankle  Sensation: intact    AROM: (* denotes performed with pain)  Foot/Ankle   DF: R 15 deg; L 12 deg  PF: R  35 deg; L 8 deg   INV: R WFL; L 7-8 deg  EV: R WFL; L 2-3 deg  Great toe ext: R WFL; L 4-5 deg     Accessory motion: limping on (L)    Flexibility:  Hip Flexor: R WFL, L Mod tightness  Hamstrings: R 105 ; L 93 deg   Quads: (B) mod tightness;  Gastroc-soleus:; (B) :  mod tightness    Strength/MMT: (* denotes performed with pain)  Foot/Ankle   DF: R 5/5; L 3+/5  PF: R 5/5; L 3+/5  INV: R 5/5; L 4/5  EV: R 5/5; L 4/5  Great toe ext: R 5/5; L 4/5       Gait: pt ambulates on level ground with decreased step length , decreased stance phase , decreased foot clearance , stooped posture/forward lean, and difficulty turning  Balance: SLS: R 3-4  sec, L 3-4 sec  5 x sit to stand test;  13 sec with (B) UE assist on chair  TU sec      Assessment: Patient displays progress in her pain, functional mobility and strength; decreasing edema in (L) ankle joint. Still displays moderate tightness in Achilles tendon limiting her DF with gait and use of stairs.Painful trigger points  on peroneal muscles . MFR to gastroc/soleus to improve PF ROM. Progressed to balance training on foam pad to improve ankle stability and  balance with gait. Decreasing  tenderness noted  in the lateral posterior aspect of Ankle with manual therapy. Planning to progress with balance training and strengthening exercises to restore PLOF.        Goals: (to be met in 18 visits)  Pt will demonstrate improved DF AROM to >10 degrees to promote proper foot clearance during gait and greater ease descending stairs without compensation  Pt will have increased ankle strength to 5/5 throughout for improved ankle control with ADLs such as prolonged gait and stair negotiation with alt leg pattern  Pt will have improved SLS to >13 s for increased ankle stability with ambulation on uneven surfaces such as gravel and grass   Pt will report <2/10 pain with  standing for >60 minutes for light household chores   Pt will be independent and compliant with comprehensive HEP to maintain progress achieved in PT  Pt will normal gait mechanics with >300 ft to navigate community distances safely    Plan:Patient will be seen for 2-3 x/week for 6 weeks.   x/week or a total of 18 visits over a 90 day period. Gait training, Manual Therapy, Neuromuscular Re-education, Self-Care Home Management, Therapeutic Activities, Therapeutic Exercise, and Home Exercise Program instruction  Date: 4/10/2024  TX#: 2/18 Date:                 TX#: 3/ Date:                 TX#: 4/ Date:                 TX#: 5/ Date:   Tx#: 6/   MT: 12 minutes  PROM (L) Ankle'passive strteches DF/PF/ EVER/INVER:   Metartsal mobs;  MT: 12 minutes   MFR to (L) gastroc/soleus  PROM (L) Ankle'passive strteches DF/PF/ EVER/INVER:   Metartsal mobs;       NMES: 13 minutes  3 way gait ; 12 steps x 4   BAPS; 5 minutes NMES: 18 minutes  Heel strike/toe off in stance phase back and forth: 2-3 minutes for proper gait mechanics   3 way gait ; 12 steps x 4 with cuing to prevent ER of (R) LE  BAPS; 5 minutes  Heel/toe raises : 20 x 1 each on foam pad   Step up/down on to foam pad; 10 x 2  UE assist with step downs  Mini squats on foam  pad : 10 x 2; UE assist      TE: 22 minutes  ANKLE AROM : 2-3 minutes  4 way isometrics: 10 x 1; 5 sec with PT  Towel crunches; 25 x 1; 5 sec hold  Wind shield with towel ; 25 x 1  Ankle circles; 25 x 1 each   TE: 15  minutes  Step calf stretch: 5 x 1 ; 15 sec   Ankle Alphabets; 1 set  4 way isometrics: 10 x 1; 5 sec with PT  Towel crunches; 25 x 1; 5 sec hold  Wind shield with towel ; 25 x 1  Ankle circles; 25 x 1 each      HEP:  Ankle mobility , PF stretches, toe crunches, elevation and cold pack application to decrease edema  LEFS Score: 86.25 % (4/7/2024 12:58 PM)    Charges: NMES: 1; MT: 1; TE: 1       Total Timed Treatment: 45 min  Total Treatment Time: 45 min

## 2024-04-24 ENCOUNTER — OFFICE VISIT (OUTPATIENT)
Dept: PHYSICAL THERAPY | Age: 74
End: 2024-04-24
Payer: MEDICARE

## 2024-04-24 PROCEDURE — 97110 THERAPEUTIC EXERCISES: CPT

## 2024-04-24 PROCEDURE — 97112 NEUROMUSCULAR REEDUCATION: CPT

## 2024-04-24 NOTE — PROGRESS NOTES
Certification From: 2024  To:2024      Diagnosis:   Closed fracture of distal end of left fibula, unspecified fracture morphology, initial encounter (S82.242X)      Referring Provider: Melony Roe  Date of Evaluation:    2024    Precautions:  None Next MD visit:   none scheduled  Date of Surgery: n/a   Insurance Primary/Secondary: MEDICARE / COMMERCIAL     # Auth Visits: [id number]            Subjective: Reports being able to walk around 2 hours yesterday in the store . Pain of 3-4/10 felt with tightness and soreness at the back of her ankle.    Pain: 1-2/10 pain felt in (L) ankle       Objective:   Observation: non pitting edema around the joint  Palpation: tenderness in the lateral posterior aspect of the ankle  Sensation: intact    AROM: (* denotes performed with pain)  Foot/Ankle   DF: R 15 deg; L 12 deg  PF: R  35 deg; L 8 deg   INV: R WFL; L 7-8 deg  EV: R WFL; L 2-3 deg  Great toe ext: R WFL; L 4-5 deg     Accessory motion: limping on (L)    Flexibility:  Hip Flexor: R WFL, L Mod tightness  Hamstrings: R 105 ; L 93 deg   Quads: (B) mod tightness;  Gastroc-soleus:; (B) :  mod tightness    Strength/MMT: (* denotes performed with pain)  Foot/Ankle   DF: R 5/5; L 3+/5  PF: R 5/5; L 3+/5  INV: R 5/5; L 4/5  EV: R 5/5; L 4/5  Great toe ext: R 5/5; L 4/5       Gait: pt ambulates on level ground with decreased step length , decreased stance phase , decreased foot clearance , stooped posture/forward lean, and difficulty turning  Balance: SLS: R 3-4  sec, L 3-4 sec  5 x sit to stand test;  13 sec with (B) UE assist on chair  TU sec      Assessment: Patient displays progress in her pain, functional mobility and strength; decreasing edema in (L) ankle joint. Progressed with 2lbs ankle weights to improve joint proprioception with standing and gait on even and uneven surfaces.  Deficits in Ankle ROM and overall LE muscle strength contributing to abnormal gait patterns. Constant cuing provided with gait mechanics  to prevent falls. All exercises executed with no exacerbation of symptoms. Planning to progress with balance training and strengthening exercises to restore PLOF.        Goals: (to be met in 18 visits)  Pt will demonstrate improved DF AROM to >10 degrees to promote proper foot clearance during gait and greater ease descending stairs without compensation  Pt will have increased ankle strength to 5/5 throughout for improved ankle control with ADLs such as prolonged gait and stair negotiation with alt leg pattern  Pt will have improved SLS to >13 s for increased ankle stability with ambulation on uneven surfaces such as gravel and grass   Pt will report <2/10 pain with  standing for >60 minutes for light household chores   Pt will be independent and compliant with comprehensive HEP to maintain progress achieved in PT  Pt will normal gait mechanics with >300 ft to navigate community distances safely    Plan:Patient will be seen for 2-3 x/week for 6 weeks.   x/week or a total of 18 visits over a 90 day period. Gait training, Manual Therapy, Neuromuscular Re-education, Self-Care Home Management, Therapeutic Activities, Therapeutic Exercise, and Home Exercise Program instruction  Date: 4/10/2024  TX#: 2/18 Date: 04/22/2024           TX#: 3/18 Date:04/24/2024                 TX#: 4/18 Date:                 TX#: 5/ Date:   Tx#: 6/   MT: 12 minutes  PROM (L) Ankle'passive strteches DF/PF/ EVER/INVER:   Metartsal mobs;  MT: 12 minutes   MFR to (L) gastroc/soleus  PROM (L) Ankle'passive strteches DF/PF/ EVER/INVER:   Metartsal mobs;       NMES: 13 minutes  3 way gait ; 12 steps x 4   BAPS; 5 minutes NMES: 18 minutes  Heel strike/toe off in stance phase back and forth: 2-3 minutes for proper gait mechanics   3 way gait ; 12 steps x 4 with cuing to prevent ER of (R) LE  BAPS; 5 minutes  Heel/toe raises : 20 x 1 each on foam pad   Step up/down on to foam pad; 10 x 2  UE assist with step downs  Mini squats on foam pad : 10 x 2; UE  assist NMES: 31 minutes  Heel strike/toe off in stance phase back and forth: 2-3 minutes for proper gait mechanics   3 way gait ; 12 steps x 4 with cuing to prevent ER of (R) LE; 2 lbs   Standing Marching  on foam pad; 25 x 1; 2 lbs   Heel/toe raises : 20 x 1 each on foam pad   Fwd/Lateral Step up/down on to foam pad; 20 x 2  UE assist with step downs; 2 lbs   Mini squats on foam pad : 10 x 2; UE assist  Forward lunges; 20 x 1; 5 sec each for ankle DF mobility      TE: 22 minutes  ANKLE AROM : 2-3 minutes  4 way isometrics: 10 x 1; 5 sec with PT  Towel crunches; 25 x 1; 5 sec hold  Wind shield with towel ; 25 x 1  Ankle circles; 25 x 1 each   TE: 15  minutes  Step calf stretch: 5 x 1 ; 15 sec   Ankle Alphabets; 1 set  4 way isometrics: 10 x 1; 5 sec with PT  Towel crunches; 25 x 1; 5 sec hold  Wind shield with towel ; 25 x 1  Ankle circles; 25 x 1 each TE: 14  minutes  Step calf stretch: 5 x 1 ; 15 sec   Ankle Alphabets; 1 set  4 way isometrics: 10 x 1; 5 sec with PT  Towel crunches; 25 x 1; 5 sec hold  Wind shield with towel ; 25 x 1  Ankle circles; 25 x 1 each  Seated marching ; 25 x 1; 2 lbs  Ankle DF: 20 x 1; 2 lbs     HEP:  Ankle mobility , PF stretches, toe crunches, elevation and cold pack application to decrease edema  LEFS Score: 86.25 % (4/7/2024 12:58 PM)    Charges: NMES: 2; TE: 1       Total Timed Treatment: 45 min  Total Treatment Time: 45 min

## 2024-04-29 ENCOUNTER — OFFICE VISIT (OUTPATIENT)
Dept: PHYSICAL THERAPY | Age: 74
End: 2024-04-29
Payer: MEDICARE

## 2024-04-29 PROCEDURE — 97112 NEUROMUSCULAR REEDUCATION: CPT

## 2024-04-29 PROCEDURE — 97110 THERAPEUTIC EXERCISES: CPT

## 2024-04-29 NOTE — PROGRESS NOTES
Certification From: 2024  To:2024      Diagnosis:   Closed fracture of distal end of left fibula, unspecified fracture morphology, initial encounter (S82.175K)      Referring Provider: Melony Roe  Date of Evaluation:    2024    Precautions:  None Next MD visit:   none scheduled  Date of Surgery: n/a   Insurance Primary/Secondary: MEDICARE / COMMERCIAL     # Auth Visits: 18            Subjective: Reports being able to walk around 2 hours yesterday in the store . Pain of 3-4/10 felt with tightness and soreness at the back of her ankle.    Pain: 1-2/10 pain felt in (L) ankle       Objective:   Observation: non pitting edema around the joint  Palpation: tenderness in the lateral posterior aspect of the ankle  Sensation: intact    AROM: (* denotes performed with pain)  Foot/Ankle   DF: R 15 deg; L 12 deg  PF: R  35 deg; L 8 deg   INV: R WFL; L 7-8 deg  EV: R WFL; L 2-3 deg  Great toe ext: R WFL; L 4-5 deg     Accessory motion: limping on (L)    Flexibility:  Hip Flexor: R WFL, L Mod tightness  Hamstrings: R 105 ; L 93 deg   Quads: (B) mod tightness;  Gastroc-soleus:; (B) :  mod tightness    Strength/MMT: (* denotes performed with pain)  Foot/Ankle   DF: R 5/5; L 3+/5  PF: R 5/5; L 3+/5  INV: R 5/5; L 4/5  EV: R 5/5; L 4/5  Great toe ext: R 5/5; L 4/5       Gait: pt ambulates on level ground with decreased step length , decreased stance phase , decreased foot clearance , stooped posture/forward lean, and difficulty turning  Balance: SLS: R 3-4  sec, L 3-4 sec  5 x sit to stand test;  13 sec with (B) UE assist on chair  TU sec      Assessment: Patient displays progress in her pain, functional mobility and strength; decreasing edema in (L) ankle joint. Progressed to using step ups/ down on 6 inch step progressing tp stairs with 2lbs ankle weights to improve joint proprioception with standing and gait on even and uneven surfaces.  Patient was challenged with single leg balance on even and uneven surface,  static lunges to improve balance, joint stability and mobility to prevent falls.  Visual and verbal cuing provided to prevent looking down and for even weight shifts on foot . Deficits in Ankle ROM and overall LE muscle strength contributing to abnormal gait patterns. Constant cuing provided with gait mechanics to prevent falls. All exercises executed with no exacerbation of symptoms. Planning to progress with balance training on uneven surface and strengthening exercises to restore PLOF and prevent falls.        Goals: (to be met in 18 visits)  Pt will demonstrate improved DF AROM to >10 degrees to promote proper foot clearance during gait and greater ease descending stairs without compensation  Pt will have increased ankle strength to 5/5 throughout for improved ankle control with ADLs such as prolonged gait and stair negotiation with alt leg pattern  Pt will have improved SLS to >13 s for increased ankle stability with ambulation on uneven surfaces such as gravel and grass   Pt will report <2/10 pain with  standing for >60 minutes for light household chores   Pt will be independent and compliant with comprehensive HEP to maintain progress achieved in PT  Pt will normal gait mechanics with >300 ft to navigate community distances safely    Plan:Patient will be seen for 2-3 x/week for 6 weeks.   x/week or a total of 18 visits over a 90 day period. Gait training, Manual Therapy, Neuromuscular Re-education, Self-Care Home Management, Therapeutic Activities, Therapeutic Exercise, and Home Exercise Program instruction  Date: 4/10/2024  TX#: 2/18 Date: 04/22/2024           TX#: 3/18 Date:04/24/2024                 TX#: 4/18 Date:04/29/2024               TX#: 5/18 Date:   Tx#: 6/   MT: 12 minutes  PROM (L) Ankle'passive strteches DF/PF/ EVER/INVER:   Metartsal mobs;  MT: 12 minutes   MFR to (L) gastroc/soleus  PROM (L) Ankle'passive strteches DF/PF/ EVER/INVER:   Metartsal mobs;       NMES: 13 minutes  3 way gait ; 12  steps x 4   BAPS; 5 minutes NMES: 18 minutes  Heel strike/toe off in stance phase back and forth: 2-3 minutes for proper gait mechanics   3 way gait ; 12 steps x 4 with cuing to prevent ER of (R) LE  BAPS; 5 minutes  Heel/toe raises : 20 x 1 each on foam pad   Step up/down on to foam pad; 10 x 2  UE assist with step downs  Mini squats on foam pad : 10 x 2; UE assist NMES: 31 minutes  Heel strike/toe off in stance phase back and forth: 2-3 minutes for proper gait mechanics   3 way gait ; 12 steps x 4 with cuing to prevent ER of (R) LE; 2 lbs   Standing Marching  on foam pad; 25 x 1; 2 lbs   Heel/toe raises : 20 x 1 each on foam pad   Fwd/Lateral Step up/down on to foam pad; 20 x 2  UE assist with step downs; 2 lbs   Mini squats on foam pad : 10 x 2; UE assist  Forward lunges; 20 x 1; 5 sec each for ankle DF mobility  NMES: 31 minutes     3 way gait ; 15  steps x 4 with cuing to prevent ER of (R) LE; 2 lbs   Single leg balance : 10x 1; 4-5 sec even and uneven surface CGA  Static lunges ; 5 x 2 with UE assist and manual stabilization on hips/pelvis to prevent forward leaning   Heel/toe raises : 20 x 1 each on foam pad   Fwd/Lateral Step up/down on to foam pad; 20 x 2  UE assist with step downs; 2 lbs   Mini squats on foam pad : 10 x 2; UE assist   Forward lunges on step; 20 x 1; 5 sec each for ankle DF mobility   Stairs; 10 x 1 ; 2 lbs UE assist progressing to NO UE use.    TE: 22 minutes  ANKLE AROM : 2-3 minutes  4 way isometrics: 10 x 1; 5 sec with PT  Towel crunches; 25 x 1; 5 sec hold  Wind shield with towel ; 25 x 1  Ankle circles; 25 x 1 each   TE: 15  minutes  Step calf stretch: 5 x 1 ; 15 sec   Ankle Alphabets; 1 set  4 way isometrics: 10 x 1; 5 sec with PT  Towel crunches; 25 x 1; 5 sec hold  Wind shield with towel ; 25 x 1  Ankle circles; 25 x 1 each TE: 14  minutes  Step calf stretch: 5 x 1 ; 15 sec   Ankle Alphabets; 1 set  4 way isometrics: 10 x 1; 5 sec with PT  Towel crunches; 25 x 1; 5 sec hold  Wind  shield with towel ; 25 x 1  Ankle circles; 25 x 1 each  Seated marching ; 25 x 1; 2 lbs  Ankle DF: 20 x 1; 2 lbs TE: 14  minutes  Step up/down 6 ; step; 10 x 2; 2 lbs  Step calf stretch: 5 x 1 ; 15 sec   Step hamstring /flexor stretch; 10 x 1; 5 sec   Pilate reformer; 10 x 2 ; single/ dbl; 2 red strings.  Ankle Alphabets; 1 set    Ankle DF: 20 x 1; 2 lbs    HEP:  Ankle mobility , PF stretches, toe crunches, elevation and cold pack application to decrease edema  LEFS Score: 86.25 % (4/7/2024 12:58 PM)    Charges: NMES: 2; TE: 1       Total Timed Treatment: 45 min  Total Treatment Time: 45 min

## 2024-05-01 ENCOUNTER — OFFICE VISIT (OUTPATIENT)
Dept: PHYSICAL THERAPY | Age: 74
End: 2024-05-01
Payer: MEDICARE

## 2024-05-01 PROCEDURE — 97112 NEUROMUSCULAR REEDUCATION: CPT

## 2024-05-01 PROCEDURE — 97110 THERAPEUTIC EXERCISES: CPT

## 2024-05-01 NOTE — PROGRESS NOTES
Certification From: 2024  To:2024      Diagnosis:   Closed fracture of distal end of left fibula, unspecified fracture morphology, initial encounter (S82.779J)      Referring Provider: Melony Roe  Date of Evaluation:    2024    Precautions:  None Next MD visit:   none scheduled  Date of Surgery: n/a   Insurance Primary/Secondary: MEDICARE / COMMERCIAL     # Auth Visits: 18            Subjective: Pt states  improving mobility and tolerance with community distance ambulation; c/o stiffness  and burning sensation at the back of her ankle with walking and HEP which relieves with rest.    Pain: 1-2/10 pain felt in (L) ankle       Objective:   Observation: non pitting edema around the joint  Palpation: tenderness in the lateral posterior aspect of the ankle  Sensation: intact    AROM: (* denotes performed with pain)  Foot/Ankle   DF: R 15 deg; L 12 deg  PF: R  35 deg; L 8 deg   INV: R WFL; L 7-8 deg  EV: R WFL; L 2-3 deg  Great toe ext: R WFL; L 4-5 deg     Accessory motion: limping on (L)    Flexibility:  Hip Flexor: R WFL, L Mod tightness  Hamstrings: R 105 ; L 93 deg   Quads: (B) mod tightness;  Gastroc-soleus:; (B) :  mod tightness    Strength/MMT: (* denotes performed with pain)  Foot/Ankle   DF: R 5/5; L 3+/5  PF: R 5/5; L 3+/5  INV: R 5/5; L 4/5  EV: R 5/5; L 4/5  Great toe ext: R 5/5; L 4/5       Gait: pt ambulates on level ground with decreased step length , decreased stance phase , decreased foot clearance , stooped posture/forward lean, and difficulty turning  Balance: SLS: R 3-4  sec, L 3-4 sec  5 x sit to stand test;  13 sec with (B) UE assist on chair  TU sec      Assessment: Patient displays progress in her pain, functional mobility and strength; decreasing edema in (L) ankle joint.  Visual and verbal cuing provided to prevent looking down and for even weight shifts on foot . Deficits in Ankle ROM and overall LE muscle strength contributing to abnormal gait patterns. Constant cuing  provided with gait mechanics to prevent falls. All exercises executed with no exacerbation of symptoms. Planning to progress with balance training on uneven surface and strengthening exercises to restore PLOF and prevent falls.        Goals: (to be met in 18 visits)  Pt will demonstrate improved DF AROM to >10 degrees to promote proper foot clearance during gait and greater ease descending stairs without compensation  Pt will have increased ankle strength to 5/5 throughout for improved ankle control with ADLs such as prolonged gait and stair negotiation with alt leg pattern  Pt will have improved SLS to >13 s for increased ankle stability with ambulation on uneven surfaces such as gravel and grass   Pt will report <2/10 pain with  standing for >60 minutes for light household chores   Pt will be independent and compliant with comprehensive HEP to maintain progress achieved in PT  Pt will normal gait mechanics with >300 ft to navigate community distances safely    Plan:Patient will be seen for 2-3 x/week for 6 weeks.   x/week or a total of 18 visits over a 90 day period. Gait training, Manual Therapy, Neuromuscular Re-education, Self-Care Home Management, Therapeutic Activities, Therapeutic Exercise, and Home Exercise Program instruction  Date: 4/10/2024  TX#: 2/18 Date: 04/22/2024           TX#: 3/18 Date:04/24/2024                 TX#: 4/18 Date:04/29/2024               TX#: 5/18 Date:   Tx#: 6/   MT: 12 minutes  PROM (L) Ankle'passive strteches DF/PF/ EVER/INVER:   Metartsal mobs;  MT: 12 minutes   MFR to (L) gastroc/soleus  PROM (L) Ankle'passive strteches DF/PF/ EVER/INVER:   Metartsal mobs;       NMES: 13 minutes  3 way gait ; 12 steps x 4   BAPS; 5 minutes NMES: 18 minutes  Heel strike/toe off in stance phase back and forth: 2-3 minutes for proper gait mechanics   3 way gait ; 12 steps x 4 with cuing to prevent ER of (R) LE  BAPS; 5 minutes  Heel/toe raises : 20 x 1 each on foam pad   Step up/down on to foam  pad; 10 x 2  UE assist with step downs  Mini squats on foam pad : 10 x 2; UE assist NMES: 31 minutes  Heel strike/toe off in stance phase back and forth: 2-3 minutes for proper gait mechanics   3 way gait ; 12 steps x 4 with cuing to prevent ER of (R) LE; 2 lbs   Standing Marching  on foam pad; 25 x 1; 2 lbs   Heel/toe raises : 20 x 1 each on foam pad   Fwd/Lateral Step up/down on to foam pad; 20 x 2  UE assist with step downs; 2 lbs   Mini squats on foam pad : 10 x 2; UE assist  Forward lunges; 20 x 1; 5 sec each for ankle DF mobility  NMES: 31 minutes  Single leg balance : 10x 1; 4-5 sec even and uneven surface CGA  Static lunges ; 5 x 2 with UE assist and manual stabilization on hips/pelvis to prevent forward leaning   Heel/toe raises : 20 x 1 each on foam pad   Fwd/Lateral Step up/down on to foam pad; 20 x 2  UE assist with step downs; 2 lbs   Mini squats on foam pad : 10 x 2; UE assist   Forward lunges on step; 20 x 1; 5 sec each for ankle DF mobility   Stairs; 10 x 1 ; 2 lbs UE assist progressing to NO UE use.    TE: 22 minutes  ANKLE AROM : 2-3 minutes  4 way isometrics: 10 x 1; 5 sec with PT  Towel crunches; 25 x 1; 5 sec hold  Wind shield with towel ; 25 x 1  Ankle circles; 25 x 1 each   TE: 15  minutes  Step calf stretch: 5 x 1 ; 15 sec   Ankle Alphabets; 1 set  4 way isometrics: 10 x 1; 5 sec with PT  Towel crunches; 25 x 1; 5 sec hold  Wind shield with towel ; 25 x 1  Ankle circles; 25 x 1 each TE: 14  minutes  Step calf stretch: 5 x 1 ; 15 sec   Ankle Alphabets; 1 set  4 way isometrics: 10 x 1; 5 sec with PT  Towel crunches; 25 x 1; 5 sec hold  Wind shield with towel ; 25 x 1  Ankle circles; 25 x 1 each  Seated marching ; 25 x 1; 2 lbs  Ankle DF: 20 x 1; 2 lbs TE: 14  minutes  Single leg flx/ext /abd: 20 x 1  ; 2 lbs on foam pad  Step up/down 6 ; step; 10 x 2; 2 lbs  Step calf stretch: 5 x 1 ; 15 sec   Step hamstring /flexor stretch; 10 x 1; 5 sec   Pilate reformer; 10 x 2 ; single/ dbl; 2 red  strings.  Ankle Alphabets; 1 set    Ankle DF: 20 x 1; 2 lbs    HEP:  Ankle mobility , PF stretches, toe crunches, elevation and cold pack application to decrease edema  LEFS Score: 86.25 % (4/7/2024 12:58 PM)    Charges: NMES: 2; TE: 1       Total Timed Treatment: 45 min  Total Treatment Time: 45 min

## 2024-05-02 ENCOUNTER — APPOINTMENT (OUTPATIENT)
Dept: PHYSICAL THERAPY | Age: 74
End: 2024-05-02
Payer: MEDICARE

## 2024-05-08 ENCOUNTER — OFFICE VISIT (OUTPATIENT)
Dept: PHYSICAL THERAPY | Age: 74
End: 2024-05-08
Payer: MEDICARE

## 2024-05-08 ENCOUNTER — OFFICE VISIT (OUTPATIENT)
Dept: ORTHOPEDICS CLINIC | Facility: CLINIC | Age: 74
End: 2024-05-08
Payer: MEDICARE

## 2024-05-08 ENCOUNTER — HOSPITAL ENCOUNTER (OUTPATIENT)
Dept: GENERAL RADIOLOGY | Facility: HOSPITAL | Age: 74
Discharge: HOME | End: 2024-05-08
Payer: MEDICARE

## 2024-05-08 DIAGNOSIS — Z47.89 ORTHOPEDIC AFTERCARE: Primary | ICD-10-CM

## 2024-05-08 DIAGNOSIS — Z47.89 ORTHOPEDIC AFTERCARE: ICD-10-CM

## 2024-05-08 DIAGNOSIS — S82.832D CLOSED FRACTURE OF DISTAL END OF LEFT FIBULA WITH ROUTINE HEALING, UNSPECIFIED FRACTURE MORPHOLOGY, SUBSEQUENT ENCOUNTER: ICD-10-CM

## 2024-05-08 PROCEDURE — 97110 THERAPEUTIC EXERCISES: CPT

## 2024-05-08 PROCEDURE — 73610 X-RAY EXAM OF ANKLE: CPT

## 2024-05-08 PROCEDURE — 99213 OFFICE O/P EST LOW 20 MIN: CPT

## 2024-05-08 PROCEDURE — 97112 NEUROMUSCULAR REEDUCATION: CPT

## 2024-05-08 RX ORDER — MAVACAMTEN 10 MG/1
10 CAPSULE, GELATIN COATED ORAL DAILY
COMMUNITY
Start: 2024-04-15

## 2024-05-08 NOTE — PROGRESS NOTES
Certification From: 2024  To:2024      Diagnosis:   Closed fracture of distal end of left fibula, unspecified fracture morphology, initial encounter (S82.853A)      Referring Provider: Melony Roe  Date of Evaluation:    2024    Precautions:  None Next MD visit:   none scheduled  Date of Surgery: n/a   Insurance Primary/Secondary: MEDICARE / COMMERCIAL     # Auth Visits: 18            Subjective: Pt states  improving mobility and tolerance with community distance ambulation; c/o stiffness  and burning sensation at the back of her ankle with walking and HEP which relieves with rest.    Pain: 1-2/10 pain felt in (L) ankle       Objective:   Observation: non pitting edema around the joint  Palpation: tenderness in the lateral posterior aspect of the ankle  Sensation: intact    AROM: (* denotes performed with pain)  Foot/Ankle   DF: R 15 deg; L 12 deg  PF: R  35 deg; L 8 deg   INV: R WFL; L 7-8 deg  EV: R WFL; L 2-3 deg  Great toe ext: R WFL; L 4-5 deg     Accessory motion: limping on (L)    Flexibility:  Hip Flexor: R WFL, L Mod tightness  Hamstrings: R 105 ; L 93 deg   Quads: (B) mod tightness;  Gastroc-soleus:; (B) :  mod tightness    Strength/MMT: (* denotes performed with pain)  Foot/Ankle   DF: R 5/5; L 3+/5  PF: R 5/5; L 3+/5  INV: R 5/5; L 4/5  EV: R 5/5; L 4/5  Great toe ext: R 5/5; L 4/5       Gait: pt ambulates on level ground with decreased step length , decreased stance phase , decreased foot clearance , stooped posture/forward lean, and difficulty turning  Balance: SLS: R 3-4  sec, L 3-4 sec  5 x sit to stand test;  13 sec with (B) UE assist on chair  TU sec      Assessment: Patient displays progress in her pain, functional mobility and strength with fwd lunges ; increased single leg balance time without UE assist.  All exercises executed with no exacerbation of symptoms. Planning to progress with balance training on uneven surface and strengthening exercises to restore PLOF and prevent  falls.        Goals: (to be met in 18 visits)  Pt will demonstrate improved DF AROM to >10 degrees to promote proper foot clearance during gait and greater ease descending stairs without compensation  Pt will have increased ankle strength to 5/5 throughout for improved ankle control with ADLs such as prolonged gait and stair negotiation with alt leg pattern  Pt will have improved SLS to >13 s for increased ankle stability with ambulation on uneven surfaces such as gravel and grass   Pt will report <2/10 pain with  standing for >60 minutes for light household chores   Pt will be independent and compliant with comprehensive HEP to maintain progress achieved in PT  Pt will normal gait mechanics with >300 ft to navigate community distances safely    Plan:Patient will be seen for 2-3 x/week for 6 weeks.   x/week or a total of 18 visits over a 90 day period. Gait training, Manual Therapy, Neuromuscular Re-education, Self-Care Home Management, Therapeutic Activities, Therapeutic Exercise, and Home Exercise Program instruction  Date: 4/10/2024  TX#: 2/18 Date: 04/22/2024           TX#: 3/18 Date:04/24/2024                 TX#: 4/18 Date:04/29/2024               TX#: 5/18 Date: 05/08/2024  Tx#: 6/8   MT: 12 minutes  PROM (L) Ankle'passive strteches DF/PF/ EVER/INVER:   Metartsal mobs;  MT: 12 minutes   MFR to (L) gastroc/soleus  PROM (L) Ankle'passive strteches DF/PF/ EVER/INVER:   Metartsal mobs;       NMES: 13 minutes  3 way gait ; 12 steps x 4   BAPS; 5 minutes NMES: 18 minutes  Heel strike/toe off in stance phase back and forth: 2-3 minutes for proper gait mechanics   3 way gait ; 12 steps x 4 with cuing to prevent ER of (R) LE  BAPS; 5 minutes  Heel/toe raises : 20 x 1 each on foam pad   Step up/down on to foam pad; 10 x 2  UE assist with step downs  Mini squats on foam pad : 10 x 2; UE assist NMES: 31 minutes  Heel strike/toe off in stance phase back and forth: 2-3 minutes for proper gait mechanics   3 way gait ; 12  steps x 4 with cuing to prevent ER of (R) LE; 2 lbs   Standing Marching  on foam pad; 25 x 1; 2 lbs   Heel/toe raises : 20 x 1 each on foam pad   Fwd/Lateral Step up/down on to foam pad; 20 x 2  UE assist with step downs; 2 lbs   Mini squats on foam pad : 10 x 2; UE assist  Forward lunges; 20 x 1; 5 sec each for ankle DF mobility  NMES: 31 minutes  Single leg balance : 10x 1; 4-5 sec even and uneven surface CGA  Static lunges ; 5 x 2 with UE assist and manual stabilization on hips/pelvis to prevent forward leaning   Heel/toe raises : 20 x 1 each on foam pad   Fwd/Lateral Step up/down on to foam pad; 20 x 2  UE assist with step downs; 2 lbs   Mini squats on foam pad : 10 x 2; UE assist   Forward lunges on step; 20 x 1; 5 sec each for ankle DF mobility   Stairs; 10 x 1 ; 2 lbs UE assist progressing to NO UE use. NMES: 31 minutes  Single leg balance : 10x 1; 4-5 sec even and uneven surface CGA  Static lunges ; 5 x 2 with UE assist and manual stabilization on hips/pelvis to prevent forward leaning   3 way Heel/toe raises : 20 x 1 each  Fwd/Lateral Step up/down on to foam pad; 20 x 2  UE assist with step downs; 2 lbs   Mini squats on foam pad : 10 x 2; UE assist   Forward lunges on step; 20 x 1; 5 sec each for ankle DF mobility      TE: 22 minutes  ANKLE AROM : 2-3 minutes  4 way isometrics: 10 x 1; 5 sec with PT  Towel crunches; 25 x 1; 5 sec hold  Wind shield with towel ; 25 x 1  Ankle circles; 25 x 1 each   TE: 15  minutes  Step calf stretch: 5 x 1 ; 15 sec   Ankle Alphabets; 1 set  4 way isometrics: 10 x 1; 5 sec with PT  Towel crunches; 25 x 1; 5 sec hold  Wind shield with towel ; 25 x 1  Ankle circles; 25 x 1 each TE: 14  minutes  Step calf stretch: 5 x 1 ; 15 sec   Ankle Alphabets; 1 set  4 way isometrics: 10 x 1; 5 sec with PT  Towel crunches; 25 x 1; 5 sec hold  Wind shield with towel ; 25 x 1  Ankle circles; 25 x 1 each  Seated marching ; 25 x 1; 2 lbs  Ankle DF: 20 x 1; 2 lbs TE: 14  minutes  Single leg  flx/ext /abd: 20 x 1  ; 2 lbs on foam pad  Step up/down 6 ; step; 10 x 2; 2 lbs  Step calf stretch: 5 x 1 ; 15 sec   Step hamstring /flexor stretch; 10 x 1; 5 sec   Pilate reformer; 10 x 2 ; single/ dbl; 2 red strings.  Ankle Alphabets; 1 set    Ankle DF: 20 x 1; 2 lbs TE: 14  minutes  Step up/down 6 ; step; 10 x 2; 2 lbs  Step calf stretch: 5 x 1 ; 15 sec   Step hamstring /flexor stretch; 10 x 1; 5 sec   Pilate reformer; 10 x 2 ; single/ dbl; 2 red strings.  Ankle Alphabets; 1 set    Ankle DF: 20 x 1; 2 lbs   HEP:  Ankle mobility , PF stretches, toe crunches, elevation and cold pack application to decrease edema  LEFS Score: 86.25 % (4/7/2024 12:58 PM)    Charges: NMES: 2; TE: 1       Total Timed Treatment: 45 min  Total Treatment Time: 45 min

## 2024-05-08 NOTE — PROGRESS NOTES
NURSING INTAKE COMMENTS:   Chief Complaint   Patient presents with    Fracture     L ankle f/u - states she still has stiffness and swelling all the time - states PT is helping        HPI: This 73 year old female presents today with complaints of left ankle follow up. She is 14 weeks post injury from left distal fibula fracture. States she is doing well today. She has been attending physical therapy with good improvement. States that she is able to walk longer distances on the foot at this time. Still complains of occasional aching and swelling in the ankle.     Past Medical History:    Acute congestive heart failure, unspecified heart failure type (HCC)    Acute on chronic heart failure with preserved ejection fraction (HCC)    Arrhythmia    Atrial fibrillation (HCC)    Chronic atrial fibrillation (HCC)    High blood pressure    High cholesterol    Hx of headache    Irregular heart rate    Mitral valve regurgitation    Murmur    Murmur    Unspecified essential hypertension    Vertigo     Past Surgical History:   Procedure Laterality Date    Colonoscopy      Cyst removal      bilateral breasts    Electrocardiogram, complete  05-    SCANNED TO MEDIA TAB: 05-    Hysterectomy      age 35    Dasha localization wire 1 site left (cpt=19281)  1998    Dasha localization wire 1 site right (cpt=19281)  1998     Current Outpatient Medications   Medication Sig Dispense Refill    CAMZYOS 10 MG Oral Cap Take 10 mg by mouth daily.      furosemide 20 MG Oral Tab Take 1 tablet (20 mg total) by mouth daily. Take additional 20mg as needed for leg swelling/wt gain 110 tablet 3    enalapril 20 MG Oral Tab Take 1 tablet (20 mg total) by mouth 2 (two) times daily. 180 tablet 1    warfarin 5 MG Oral Tab TAKE ONE TABLET 4-DAYS WEEKLY AND TAKE ONE-HALF TABLET 3-DAYS WEEKLY OR PER ANTICOAGULATION CLINIC DIRECTION 270 tablet 1    ATORVASTATIN 10 MG Oral Tab TAKE 1 TABLET BY MOUTH EVERY DAY IN THE EVENING 90 tablet 2    METOPROLOL  SUCCINATE 50 MG Oral Tablet 24 Hr TAKE 1 TABLET BY MOUTH EVERY DAY (Patient taking differently: every evening.) 90 tablet 3    Calcium Carbonate (CALCIUM 500 OR) Take 1 tablet by mouth daily as needed.        Multiple Vitamin (MULTI-DAY) Oral Tab Take by mouth nightly. take 1 tablet by ORAL route  every day with food        No Known Allergies  Family History   Problem Relation Age of Onset    Pulmonary Disease Mother         COPD; Cause of death    Cancer Father         Liver cancer    Diabetes Maternal Grandmother     Breast Cancer Sister 36       Social History     Occupational History    Not on file   Tobacco Use    Smoking status: Former     Current packs/day: 0.00     Average packs/day: 1 pack/day for 15.0 years (15.0 ttl pk-yrs)     Types: Cigarettes     Start date: 1966     Quit date: 1981     Years since quittin.2    Smokeless tobacco: Former     Quit date: 2001   Vaping Use    Vaping status: Never Used   Substance and Sexual Activity    Alcohol use: Not Currently     Comment: rare    Drug use: No    Sexual activity: Not on file        Review of Systems:  GENERAL: denies fevers, chills, night sweats, fatigue, unintentional weight loss/gain  SKIN: denies skin lesions, open sores, rash  HEENT:denies recent vision change, new nasal congestion,hearing loss, tinnitus, sore throat, headaches  RESPIRATORY: denies new shortness of breath, cough, asthma, wheezing  CARDIOVASCULAR: denies chest pain, leg cramps with exertion, palpitations, leg swelling  GI: denies abdominal pain, nausea, vomiting, diarrhea, constipation, hematochezia, worsening heartburn or stomach ulcers  : denies dysuria, hematuria, incontinence, increased frequency, urgency, difficulty urinating  MUSCULOSKELETAL: denies musculoskeletal complaints other than in HPI  NEURO: denies numbness, tingling, weakness, balance issues, dizziness, memory loss  PSYCHIATRIC: denies Hx of depression, anxiety, other psychiatric  disorders  HEMATOLOGIC: denies blood clots, anemia, blood clotting disorders, blood transfusion  ENDOCRINE: denies autoimmune disease, thyroid issues, or diabetes  ALLERGY: denies asthma, seasonal allergies    Physical Examination:    There were no vitals taken for this visit.  Constitutional: appears well hydrated, alert and responsive, no acute distress noted  Extremities: Left distal fibula is nontender to palpation. Medial malleolus nontender. Calcaneus nontender. Dorsiflexion and plantar flexion greatly improved since last appointment. No pain with passive inversion, eversion, dorsiflexion, or plantar flexion of the foot at this time. No numbness.   Neurological: Unchanged     Imaging:   No results found.     Labs:  Lab Results   Component Value Date    WBC 5.0 04/21/2023    HGB 12.2 04/21/2023    .0 04/21/2023      Lab Results   Component Value Date    GLU 89 05/09/2023    BUN 42 (H) 05/09/2023    CREATSERUM 1.23 (H) 05/09/2023    GFRNAA 47 (L) 04/19/2022    GFRAA 54 (L) 04/19/2022        Assessment and Plan:  Diagnoses and all orders for this visit:    Orthopedic aftercare  -     XR ANKLE WEIGHTBEARING (3 VIEWS), LEFT (CPT=73610); Future    Closed fracture of distal end of left fibula with routine healing, unspecified fracture morphology, subsequent encounter        Assessment: Healing left distal fibula fracture     Plan: I recommend completing physical therapy and continuing home exercises. She may continue to return to activities as tolerated. Advised icing, elevating, compression stockings, and oral anti-inflammatories as needed. Follow up as needed.     Follow Up: Return if symptoms worsen or fail to improve.    Melony Roe PA-C

## 2024-05-13 ENCOUNTER — ANCILLARY PROCEDURE (OUTPATIENT)
Dept: CARDIOLOGY | Age: 74
End: 2024-05-13
Attending: INTERNAL MEDICINE

## 2024-05-13 DIAGNOSIS — I42.2 HYPERTROPHIC CARDIOMYOPATHY  (CMD): ICD-10-CM

## 2024-05-13 LAB
ASCENDING AORTA (AAD): 3
AV STENOSIS SEVERITY TEXT: NORMAL
AVI LVOT PEAK GRADIENT (LVOTMG): 1.2
LEFT INTERNAL DIMENSION IN SYSTOLE (LVSD): 1.4
LEFT VENTRICULAR INTERNAL DIMENSION IN DIASTOLE (LVDD): 3
LEFT VENTRICULAR POSTERIOR WALL IN END DIASTOLE (LVPW): 4.4
LV EF: NORMAL %
RV END SYSTOLIC LONGITUDINAL STRAIN FREE WALL (RVGS): 2
TRICUSPID VALVE ANNULAR PEAK VELOCITY (TVAPV): 39
TRICUSPID VALVE PEAK REGURGITATION VELOCITY (TRPV): 2.8

## 2024-05-13 PROCEDURE — 93325 DOPPLER ECHO COLOR FLOW MAPG: CPT | Performed by: INTERNAL MEDICINE

## 2024-05-13 PROCEDURE — 93321 DOPPLER ECHO F-UP/LMTD STD: CPT | Performed by: INTERNAL MEDICINE

## 2024-05-13 PROCEDURE — 93308 TTE F-UP OR LMTD: CPT | Performed by: INTERNAL MEDICINE

## 2024-05-14 ENCOUNTER — TELEPHONE (OUTPATIENT)
Dept: CARDIOLOGY | Age: 74
End: 2024-05-14

## 2024-05-14 RX ORDER — MAVACAMTEN 10 MG/1
10 CAPSULE, GELATIN COATED ORAL DAILY
Qty: 30 CAPSULE | Refills: 11 | Status: SHIPPED | OUTPATIENT
Start: 2024-05-14

## 2024-05-15 ENCOUNTER — APPOINTMENT (OUTPATIENT)
Dept: PHYSICAL THERAPY | Age: 74
End: 2024-05-15
Payer: MEDICARE

## 2024-05-20 ENCOUNTER — OFFICE VISIT (OUTPATIENT)
Dept: PHYSICAL THERAPY | Age: 74
End: 2024-05-20
Payer: MEDICARE

## 2024-05-20 PROCEDURE — 97112 NEUROMUSCULAR REEDUCATION: CPT

## 2024-05-20 PROCEDURE — 97110 THERAPEUTIC EXERCISES: CPT

## 2024-05-20 NOTE — PROGRESS NOTES
Certification From: 2024  To:2024      Diagnosis:   Closed fracture of distal end of left fibula, unspecified fracture morphology, initial encounter (S82.251K)      Referring Provider: Melony Roe  Date of Evaluation:    2024    Precautions:  None Next MD visit:   none scheduled  Date of Surgery: n/a   Insurance Primary/Secondary: MEDICARE / COMMERCIAL     # Auth Visits: 18            Subjective: Pt states decreasing  stiffness  and burning sensation at the back of her ankle with walking which relieves with rest.    Pain: 1-2/10 pain felt in front of (L) ankle       Objective:   Observation: non pitting edema around the joint  Palpation: tenderness in the lateral posterior aspect of the ankle  Sensation: intact    AROM: (* denotes performed with pain)  Foot/Ankle   DF: R 15 deg; L 12 deg  PF: R  35 deg; L 8 deg   INV: R WFL; L 7-8 deg  EV: R WFL; L 2-3 deg  Great toe ext: R WFL; L 4-5 deg     Accessory motion: limping on (L)    Flexibility:  Hip Flexor: R WFL, L Mod tightness  Hamstrings: R 105 ; L 93 deg   Quads: (B) mod tightness;  Gastroc-soleus:; (B) :  mod tightness    Strength/MMT: (* denotes performed with pain)  Foot/Ankle   DF: R 5/5; L 3+/5  PF: R 5/5; L 3+/5  INV: R 5/5; L 4/5  EV: R 5/5; L 4/5  Great toe ext: R 5/5; L 4/5       Gait: pt ambulates on level ground with decreased step length , decreased stance phase , decreased foot clearance , stooped posture/forward lean, and difficulty turning  Balance: SLS: R 3-4  sec, L 3-4 sec  5 x sit to stand test;  13 sec with (B) UE assist on chair  TU sec      Assessment: Progressed to obstacle courses with even and uneven surfaces picj=brittney up cones to improve balance and stabilty on all terrains without  falls. All exercises executed with no exacerbation of symptoms. Planning to progress with balance training on uneven surface and strengthening exercises to restore PLOF and prevent falls.        Goals: (to be met in 18 visits)  Pt will  demonstrate improved DF AROM to >10 degrees to promote proper foot clearance during gait and greater ease descending stairs without compensation  Pt will have increased ankle strength to 5/5 throughout for improved ankle control with ADLs such as prolonged gait and stair negotiation with alt leg pattern  Pt will have improved SLS to >13 s for increased ankle stability with ambulation on uneven surfaces such as gravel and grass   Pt will report <2/10 pain with  standing for >60 minutes for light household chores   Pt will be independent and compliant with comprehensive HEP to maintain progress achieved in PT  Pt will normal gait mechanics with >300 ft to navigate community distances safely    Plan:Patient will be seen for 2-3 x/week for 6 weeks.   x/week or a total of 18 visits over a 90 day period. Gait training, Manual Therapy, Neuromuscular Re-education, Self-Care Home Management, Therapeutic Activities, Therapeutic Exercise, and Home Exercise Program instruction  Date: 4/10/2024  TX#: 2/18 Date: 04/22/2024           TX#: 3/18 Date:04/24/2024                 TX#: 4/18 Date:04/29/2024               TX#: 5/18 Date: 05/08/2024  Tx#: 6/8 Date: 05/20/2024  Tx: 7/8   MT: 12 minutes  PROM (L) Ankle'passive strteches DF/PF/ EVER/INVER:   Metartsal mobs;  MT: 12 minutes   MFR to (L) gastroc/soleus  PROM (L) Ankle'passive strteches DF/PF/ EVER/INVER:   Metartsal mobs;        NMES: 13 minutes  3 way gait ; 12 steps x 4   BAPS; 5 minutes NMES: 18 minutes  Heel strike/toe off in stance phase back and forth: 2-3 minutes for proper gait mechanics   3 way gait ; 12 steps x 4 with cuing to prevent ER of (R) LE  BAPS; 5 minutes  Heel/toe raises : 20 x 1 each on foam pad   Step up/down on to foam pad; 10 x 2  UE assist with step downs  Mini squats on foam pad : 10 x 2; UE assist NMES: 31 minutes  Heel strike/toe off in stance phase back and forth: 2-3 minutes for proper gait mechanics   3 way gait ; 12 steps x 4 with cuing to  prevent ER of (R) LE; 2 lbs   Standing Marching  on foam pad; 25 x 1; 2 lbs   Heel/toe raises : 20 x 1 each on foam pad   Fwd/Lateral Step up/down on to foam pad; 20 x 2  UE assist with step downs; 2 lbs   Mini squats on foam pad : 10 x 2; UE assist  Forward lunges; 20 x 1; 5 sec each for ankle DF mobility  NMES: 31 minutes  Single leg balance : 10x 1; 4-5 sec even and uneven surface CGA  Static lunges ; 5 x 2 with UE assist and manual stabilization on hips/pelvis to prevent forward leaning   Heel/toe raises : 20 x 1 each on foam pad   Fwd/Lateral Step up/down on to foam pad; 20 x 2  UE assist with step downs; 2 lbs   Mini squats on foam pad : 10 x 2; UE assist   Forward lunges on step; 20 x 1; 5 sec each for ankle DF mobility   Stairs; 10 x 1 ; 2 lbs UE assist progressing to NO UE use. NMES: 31 minutes  Single leg balance : 10x 1; 4-5 sec even and uneven surface CGA  Static lunges ; 5 x 2 with UE assist and manual stabilization on hips/pelvis to prevent forward leaning   3 way Heel/toe raises : 20 x 1 each  Fwd/Lateral Step up/down on to foam pad; 20 x 2  UE assist with step downs; 2 lbs   Mini squats on foam pad : 10 x 2; UE assist   Forward lunges on step; 20 x 1; 5 sec each for ankle DF mobility    NMES: 31 minutes  Single leg balance : 10x 1; 4-5 sec even and uneven surface CGA  Static lunges ; 10 x 2 with UE assist and manual stabilization on hips/pelvis to prevent forward leaning   Heel raises with ball for tibialis post strengthening   : 10 x 2 each  Toe raises on slanted step: 10 x 2 for Tibialis Anterior strengthening   Stairs down on to foam pad; 20 x 2  UE assist  ; 2 lbs  Lateral stepping on foam pad; 20 x 1 ; 2 lbs   Mini squats on foam pad : 10 x 2; UE assist   Forward lunges on step; 20 x 1; 5 sec each for ankle DF mobility   Obstacle course with picking up cones; 8-10  minutes   TE: 22 minutes  ANKLE AROM : 2-3 minutes  4 way isometrics: 10 x 1; 5 sec with PT  Towel crunches; 25 x 1; 5 sec  hold  Wind shield with towel ; 25 x 1  Ankle circles; 25 x 1 each   TE: 15  minutes  Step calf stretch: 5 x 1 ; 15 sec   Ankle Alphabets; 1 set  4 way isometrics: 10 x 1; 5 sec with PT  Towel crunches; 25 x 1; 5 sec hold  Wind shield with towel ; 25 x 1  Ankle circles; 25 x 1 each TE: 14  minutes  Step calf stretch: 5 x 1 ; 15 sec   Ankle Alphabets; 1 set  4 way isometrics: 10 x 1; 5 sec with PT  Towel crunches; 25 x 1; 5 sec hold  Wind shield with towel ; 25 x 1  Ankle circles; 25 x 1 each  Seated marching ; 25 x 1; 2 lbs  Ankle DF: 20 x 1; 2 lbs TE: 14  minutes  Single leg flx/ext /abd: 20 x 1  ; 2 lbs on foam pad  Step up/down 6 ; step; 10 x 2; 2 lbs  Step calf stretch: 5 x 1 ; 15 sec   Step hamstring /flexor stretch; 10 x 1; 5 sec   Pilate reformer; 10 x 2 ; single/ dbl; 2 red strings.  Ankle Alphabets; 1 set    Ankle DF: 20 x 1; 2 lbs TE: 14  minutes  Step up/down 6 ; step; 10 x 2; 2 lbs  Step calf stretch: 5 x 1 ; 15 sec   Step hamstring /flexor stretch; 10 x 1; 5 sec   Pilate reformer; 10 x 2 ; single/ dbl; 2 red strings.  Ankle Alphabets; 1 set    Ankle DF: 20 x 1; 2 lbs TE: 14  minutes  Step up/down 6 ; step; 10 x 2; 2 lbs  Step calf stretch: 5 x 1 ; 15 sec   Step hamstring /flexor stretch; 10 x 1; 5 sec   Pilate reformer; 20 x 1 ;   Ankle Alphabets; 1 set    Ankle DF: 20 x 1; 2 lbs   HEP:  Ankle mobility , PF stretches, toe crunches, elevation and cold pack application to decrease edema  LEFS Score: 86.25 % (4/7/2024 12:58 PM)    Charges: NMES: 2; TE: 1       Total Timed Treatment: 45 min  Total Treatment Time: 45 min

## 2024-05-22 ENCOUNTER — OFFICE VISIT (OUTPATIENT)
Dept: PHYSICAL THERAPY | Age: 74
End: 2024-05-22
Payer: MEDICARE

## 2024-05-22 PROCEDURE — 97112 NEUROMUSCULAR REEDUCATION: CPT

## 2024-05-22 NOTE — PROGRESS NOTES
Certification From: 2024  To:2024      Diagnosis:   Closed fracture of distal end of left fibula, unspecified fracture morphology, initial encounter (S82.590I)      Referring Provider: Melony Roe  Date of Evaluation:    2024    Precautions:  None Next MD visit:   none scheduled  Date of Surgery: n/a   Insurance Primary/Secondary: MEDICARE / COMMERCIAL     # Auth Visits: 18            Subjective: Pt states decreasing stiffness  at the back of her ankle with walking which relieves with rest. Denies any burning sensation in her ankle.     Pain: 0/10 pain felt in front of (L) ankle       Objective:   Observation: non pitting edema around the joint  Palpation: tenderness in the lateral posterior aspect of the ankle  Sensation: intact    AROM: (* denotes performed with pain)  Foot/Ankle   DF: R 15 deg; L 12 deg  PF: R  35 deg; L 8 deg   INV: R WFL; L 7-8 deg  EV: R WFL; L 2-3 deg  Great toe ext: R WFL; L 4-5 deg     Accessory motion: limping on (L)    Flexibility:  Hip Flexor: R WFL, L Mod tightness  Hamstrings: R 105 ; L 93 deg   Quads: (B) mod tightness;  Gastroc-soleus:; (B) :  mod tightness    Strength/MMT: (* denotes performed with pain)  Foot/Ankle   DF: R 5/5; L 3+/5  PF: R 5/5; L 3+/5  INV: R 5/5; L 4/5  EV: R 5/5; L 4/5  Great toe ext: R 5/5; L 4/5       Gait: pt ambulates on level ground with decreased step length , decreased stance phase , decreased foot clearance , stooped posture/forward lean, and difficulty turning  Balance: SLS: R 3-4  sec, L 3-4 sec  5 x sit to stand test;  13 sec with (B) UE assist on chair  TU sec      Assessment: Pt displays significant progress in her ankle mobility and strangth with functional squatting, lunges, sit to stnad transfers;Improving balance with squats and lunges and with obstacle course. However deficits still noted with stepping up on to foam pad and stairs.  All exercises executed with no exacerbation of symptoms. Discussed discharge plan with the  patient after 2 more visits.        Goals: (to be met in 18 visits)  Pt will demonstrate improved DF AROM to >10 degrees to promote proper foot clearance during gait and greater ease descending stairs without compensation  Pt will have increased ankle strength to 5/5 throughout for improved ankle control with ADLs such as prolonged gait and stair negotiation with alt leg pattern  Pt will have improved SLS to >13 s for increased ankle stability with ambulation on uneven surfaces such as gravel and grass   Pt will report <2/10 pain with  standing for >60 minutes for light household chores   Pt will be independent and compliant with comprehensive HEP to maintain progress achieved in PT  Pt will normal gait mechanics with >300 ft to navigate community distances safely    Plan:Patient will be seen for 2-3 x/week for 6 weeks.   x/week or a total of 18 visits over a 90 day period. Gait training, Manual Therapy, Neuromuscular Re-education, Self-Care Home Management, Therapeutic Activities, Therapeutic Exercise, and Home Exercise Program instruction  Date: 4/10/2024  TX#: 2/18 Date: 04/22/2024           TX#: 3/18 Date:04/24/2024                 TX#: 4/18 Date:04/29/2024               TX#: 5/18 Date: 05/08/2024  Tx#: 6/8 Date: 05/20/2024  Tx: 7/8 Date: 05/22/2024  Tx: 8/8   MT: 12 minutes  PROM (L) Ankle'passive strteches DF/PF/ EVER/INVER:   Metartsal mobs;  MT: 12 minutes   MFR to (L) gastroc/soleus  PROM (L) Ankle'passive strteches DF/PF/ EVER/INVER:   Metartsal mobs;         NMES: 13 minutes  3 way gait ; 12 steps x 4   BAPS; 5 minutes NMES: 18 minutes  Heel strike/toe off in stance phase back and forth: 2-3 minutes for proper gait mechanics   3 way gait ; 12 steps x 4 with cuing to prevent ER of (R) LE  BAPS; 5 minutes  Heel/toe raises : 20 x 1 each on foam pad   Step up/down on to foam pad; 10 x 2  UE assist with step downs  Mini squats on foam pad : 10 x 2; UE assist NMES: 31 minutes  Heel strike/toe off in stance  phase back and forth: 2-3 minutes for proper gait mechanics   3 way gait ; 12 steps x 4 with cuing to prevent ER of (R) LE; 2 lbs   Standing Marching  on foam pad; 25 x 1; 2 lbs   Heel/toe raises : 20 x 1 each on foam pad   Fwd/Lateral Step up/down on to foam pad; 20 x 2  UE assist with step downs; 2 lbs   Mini squats on foam pad : 10 x 2; UE assist  Forward lunges; 20 x 1; 5 sec each for ankle DF mobility  NMES: 31 minutes  Single leg balance : 10x 1; 4-5 sec even and uneven surface CGA  Static lunges ; 5 x 2 with UE assist and manual stabilization on hips/pelvis to prevent forward leaning   Heel/toe raises : 20 x 1 each on foam pad   Fwd/Lateral Step up/down on to foam pad; 20 x 2  UE assist with step downs; 2 lbs   Mini squats on foam pad : 10 x 2; UE assist   Forward lunges on step; 20 x 1; 5 sec each for ankle DF mobility   Stairs; 10 x 1 ; 2 lbs UE assist progressing to NO UE use. NMES: 31 minutes  Single leg balance : 10x 1; 4-5 sec even and uneven surface CGA  Static lunges ; 5 x 2 with UE assist and manual stabilization on hips/pelvis to prevent forward leaning   3 way Heel/toe raises : 20 x 1 each  Fwd/Lateral Step up/down on to foam pad; 20 x 2  UE assist with step downs; 2 lbs   Mini squats on foam pad : 10 x 2; UE assist   Forward lunges on step; 20 x 1; 5 sec each for ankle DF mobility    NMES: 31 minutes  Single leg balance : 10x 1; 4-5 sec even and uneven surface CGA  Static lunges ; 10 x 2 with UE assist and manual stabilization on hips/pelvis to prevent forward leaning   Heel raises with ball for tibialis post strengthening   : 10 x 2 each  Toe raises on slanted step: 10 x 2 for Tibialis Anterior strengthening   Stairs down on to foam pad; 20 x 2  UE assist  ; 2 lbs  Lateral stepping on foam pad; 20 x 1 ; 2 lbs   Mini squats on foam pad : 10 x 2; UE assist   Forward lunges on step; 20 x 1; 5 sec each for ankle DF mobility   Obstacle course with picking up cones; 8-10  minutes NMES: 45 mins  Single  leg balance : 10x 1; 4-5 sec even and uneven surface CGA  Static lunges ; 10 x 2 without UE assist and manual stabilization on hips/pelvis to prevent forward leaning   Heel raises with ball for tibialis post strengthening   : 10 x 2 each  Toe raises on slanted step: 10 x 2 for Tibialis Anterior strengthening   Stairs down on to foam pad; 20 x 2  UE assist  Mini squats on foam pad : 10 x 2; UE assist   Forward lunges on step; 20 x 1; 5 sec each for ankle DF mobility   Obstacle course with picking up cones; 8-10  minutes  Step up/down 6 ; step; 10 x 2; 2 lbs  Step calf stretch: 5 x 1 ; 15 sec   Step hamstring /flexor stretch; 10 x 1; 5 sec   Pilate reformer; 20 x 1 ;    TE: 22 minutes  ANKLE AROM : 2-3 minutes  4 way isometrics: 10 x 1; 5 sec with PT  Towel crunches; 25 x 1; 5 sec hold  Wind shield with towel ; 25 x 1  Ankle circles; 25 x 1 each   TE: 15  minutes  Step calf stretch: 5 x 1 ; 15 sec   Ankle Alphabets; 1 set  4 way isometrics: 10 x 1; 5 sec with PT  Towel crunches; 25 x 1; 5 sec hold  Wind shield with towel ; 25 x 1  Ankle circles; 25 x 1 each TE: 14  minutes  Step calf stretch: 5 x 1 ; 15 sec   Ankle Alphabets; 1 set  4 way isometrics: 10 x 1; 5 sec with PT  Towel crunches; 25 x 1; 5 sec hold  Wind shield with towel ; 25 x 1  Ankle circles; 25 x 1 each  Seated marching ; 25 x 1; 2 lbs  Ankle DF: 20 x 1; 2 lbs TE: 14  minutes  Single leg flx/ext /abd: 20 x 1  ; 2 lbs on foam pad  Step up/down 6 ; step; 10 x 2; 2 lbs  Step calf stretch: 5 x 1 ; 15 sec   Step hamstring /flexor stretch; 10 x 1; 5 sec   Pilate reformer; 10 x 2 ; single/ dbl; 2 red strings.  Ankle Alphabets; 1 set    Ankle DF: 20 x 1; 2 lbs TE: 14  minutes  Step up/down 6 ; step; 10 x 2; 2 lbs  Step calf stretch: 5 x 1 ; 15 sec   Step hamstring /flexor stretch; 10 x 1; 5 sec   Pilate reformer; 10 x 2 ; single/ dbl; 2 red strings.  Ankle Alphabets; 1 set    Ankle DF: 20 x 1; 2 lbs TE: 14  minutes  Step up/down 6 ; step; 10 x 2; 2 lbs  Step  calf stretch: 5 x 1 ; 15 sec   Step hamstring /flexor stretch; 10 x 1; 5 sec   Pilate reformer; 20 x 1 ;   Ankle Alphabets; 1 set    Ankle DF: 20 x 1; 2 lbs    HEP:  Ankle mobility , PF stretches, toe crunches, elevation and cold pack application to decrease edema  LEFS Score: 86.25 % (4/7/2024 12:58 PM)    Charges: NMES: 3;        Total Timed Treatment: 45 min  Total Treatment Time: 45 min

## 2024-05-29 ENCOUNTER — OFFICE VISIT (OUTPATIENT)
Dept: PHYSICAL THERAPY | Age: 74
End: 2024-05-29
Payer: MEDICARE

## 2024-05-29 PROCEDURE — 97112 NEUROMUSCULAR REEDUCATION: CPT

## 2024-05-29 NOTE — PROGRESS NOTES
Discharge Summary  Pt has attended 9 visits in Physical Therapy. Certification From: 2024  To:2024      Diagnosis:   Closed fracture of distal end of left fibula, unspecified fracture morphology, initial encounter (S82.832A)      Referring Provider: Melony Roe  Date of Evaluation:    2024    Precautions:  None Next MD visit:   none scheduled  Date of Surgery: n/a   Insurance Primary/Secondary: MEDICARE / COMMERCIAL     # Auth Visits: 18            Subjective: Pt states decreasing stiffness  at the back of her ankle with walking which relieves with rest. Denies any burning sensation in her ankle.     Pain: 0/10 pain felt in front of (L) ankle       Objective:   Observation: non pitting edema around the joint  Palpation: tenderness in the lateral posterior aspect of the ankle  Sensation: intact    AROM: (* denotes performed with pain)  Foot/Ankle   DF: R 15 deg; L 15 deg  PF: R  35 deg; L 35 deg   INV: R WFL; L 7-8 deg  EV: R WFL; L  5-6  deg  Great toe ext: R WFL; L : WFL     Accessory motion: limping on (L)    Flexibility:  Hip Flexor: R WFL, L Mod tightness  Hamstrings: R 105 ; L 93 deg   Quads: (B) mod tightness;  Gastroc-soleus:; (B) :  mod tightness    Strength/MMT: (* denotes performed with pain)  Foot/Ankle   DF: R 5/5; L 3+/5  PF: R 5/5; L 3+/5  INV: R 5/5; L 4/5  EV: R 5/5; L 4/5  Great toe ext: R 5/5; L 4/5       Gait: pt ambulates on level ground with decreased step length , decreased stance phase , decreased foot clearance , stooped posture/forward lean, and difficulty turning  Balance: SLS: R 3-4  sec, L 3-4 sec; D/C: (B) 12- 13 sec  5 x sit to stand test;  13 sec with (B) UE assist on chair  TU sec; D/C: 12 sec       Assessment: Pt displays significant progress in her ankle mobility and strangth with functional squatting, lunges, sit to stnad transfers;Improving balance. Significant progress in TUG score and single leg balance. Patient  has met all her established goals. Discharging  from skilled therapy as per pt's request. Reviewed HEP  with pt for continued wellness and prevent re- injury.      Goals: (to be met in 18 visits)  Pt will demonstrate improved DF AROM to >10 degrees to promote proper foot clearance during gait and greater ease descending stairs without compensation- Met  Pt will have increased ankle strength to 5/5 throughout for improved ankle control with ADLs such as prolonged gait and stair negotiation with alt leg pattern- Met  Pt will have improved SLS to >13 s for increased ankle stability with ambulation on uneven surfaces such as gravel and grass - Met  Pt will report <2/10 pain with  standing for >60 minutes for light household chores - Met  Pt will be independent and compliant with comprehensive HEP to maintain progress achieved in PT-Met  Pt will normal gait mechanics with >300 ft to navigate community distances safely- Met    Plan:Patient will be seen for 2-3 x/week for 6 weeks.   x/week or a total of 18 visits over a 90 day period. Gait training, Manual Therapy, Neuromuscular Re-education, Self-Care Home Management, Therapeutic Activities, Therapeutic Exercise, and Home Exercise Program instruction  Date:04/24/2024                 TX#: 4/18 Date:04/29/2024               TX#: 5/18 Date: 05/08/2024  Tx#: 6/18 Date: 05/20/2024  Tx: 7/18 Date: 05/22/2024  Tx: 8/18 Date: 05/29/2024  Tx: 9/18   NMES: 31 minutes  Heel strike/toe off in stance phase back and forth: 2-3 minutes for proper gait mechanics   3 way gait ; 12 steps x 4 with cuing to prevent ER of (R) LE; 2 lbs   Standing Marching  on foam pad; 25 x 1; 2 lbs   Heel/toe raises : 20 x 1 each on foam pad   Fwd/Lateral Step up/down on to foam pad; 20 x 2  UE assist with step downs; 2 lbs   Mini squats on foam pad : 10 x 2; UE assist  Forward lunges; 20 x 1; 5 sec each for ankle DF mobility  NMES: 31 minutes  Single leg balance : 10x 1; 4-5 sec even and uneven surface CGA  Static lunges ; 5 x 2 with UE assist and manual  stabilization on hips/pelvis to prevent forward leaning   Heel/toe raises : 20 x 1 each on foam pad   Fwd/Lateral Step up/down on to foam pad; 20 x 2  UE assist with step downs; 2 lbs   Mini squats on foam pad : 10 x 2; UE assist   Forward lunges on step; 20 x 1; 5 sec each for ankle DF mobility   Stairs; 10 x 1 ; 2 lbs UE assist progressing to NO UE use. NMES: 31 minutes  Single leg balance : 10x 1; 4-5 sec even and uneven surface CGA  Static lunges ; 5 x 2 with UE assist and manual stabilization on hips/pelvis to prevent forward leaning   3 way Heel/toe raises : 20 x 1 each  Fwd/Lateral Step up/down on to foam pad; 20 x 2  UE assist with step downs; 2 lbs   Mini squats on foam pad : 10 x 2; UE assist   Forward lunges on step; 20 x 1; 5 sec each for ankle DF mobility    NMES: 31 minutes  Single leg balance : 10x 1; 4-5 sec even and uneven surface CGA  Static lunges ; 10 x 2 with UE assist and manual stabilization on hips/pelvis to prevent forward leaning   Heel raises with ball for tibialis post strengthening   : 10 x 2 each  Toe raises on slanted step: 10 x 2 for Tibialis Anterior strengthening   Stairs down on to foam pad; 20 x 2  UE assist  ; 2 lbs  Lateral stepping on foam pad; 20 x 1 ; 2 lbs   Mini squats on foam pad : 10 x 2; UE assist   Forward lunges on step; 20 x 1; 5 sec each for ankle DF mobility   Obstacle course with picking up cones; 8-10  minutes NMES: 45 mins  Single leg balance : 10x 1; 4-5 sec even and uneven surface CGA  Static lunges ; 10 x 2 without UE assist and manual stabilization on hips/pelvis to prevent forward leaning   Heel raises with ball for tibialis post strengthening   : 10 x 2 each  Toe raises on slanted step: 10 x 2 for Tibialis Anterior strengthening   Stairs down on to foam pad; 20 x 2  UE assist  Mini squats on foam pad : 10 x 2; UE assist   Forward lunges on step; 20 x 1; 5 sec each for ankle DF mobility   Obstacle course with picking up cones; 8-10  minutes  Step up/down 6 ;  step; 10 x 2; 2 lbs  Step calf stretch: 5 x 1 ; 15 sec   Step hamstring /flexor stretch; 10 x 1; 5 sec   Pilate reformer; 20 x 1 ;  NMES: 45 mins  Single leg balance : 10x 1; 4-5 sec even and uneven surface CGA  Static lunges ; 10 x 2 without UE assist and manual stabilization on hips/pelvis to prevent forward leaning   Heel raises with ball for tibialis post strengthening   : 10 x 2 each  Toe raises on slanted step: 10 x 2 for Tibialis Anterior strengthening   Stairs down on to foam pad; 20 x 2  UE assist  Mini squats on foam pad : 10 x 2; UE assist   Forward lunges on step; 20 x 1; 5 sec each for ankle DF mobility    TE: 14  minutes  Step calf stretch: 5 x 1 ; 15 sec   Ankle Alphabets; 1 set  4 way isometrics: 10 x 1; 5 sec with PT  Towel crunches; 25 x 1; 5 sec hold  Wind shield with towel ; 25 x 1  Ankle circles; 25 x 1 each  Seated marching ; 25 x 1; 2 lbs  Ankle DF: 20 x 1; 2 lbs TE: 14  minutes  Single leg flx/ext /abd: 20 x 1  ; 2 lbs on foam pad  Step up/down 6 ; step; 10 x 2; 2 lbs  Step calf stretch: 5 x 1 ; 15 sec   Step hamstring /flexor stretch; 10 x 1; 5 sec   Pilate reformer; 10 x 2 ; single/ dbl; 2 red strings.  Ankle Alphabets; 1 set    Ankle DF: 20 x 1; 2 lbs TE: 14  minutes  Step up/down 6 ; step; 10 x 2; 2 lbs  Step calf stretch: 5 x 1 ; 15 sec   Step hamstring /flexor stretch; 10 x 1; 5 sec   Pilate reformer; 10 x 2 ; single/ dbl; 2 red strings.  Ankle Alphabets; 1 set    Ankle DF: 20 x 1; 2 lbs TE: 14  minutes  Step up/down 6 ; step; 10 x 2; 2 lbs  Step calf stretch: 5 x 1 ; 15 sec   Step hamstring /flexor stretch; 10 x 1; 5 sec   Pilate reformer; 20 x 1 ;   Ankle Alphabets; 1 set    Ankle DF: 20 x 1; 2 lbs     HEP:  Ankle mobility , PF stretches, toe crunches, elevation and cold pack application to decrease edema  LEFS Score: 86.25 % (4/7/2024 12:58 PM)  Post LEFS Score  Post LEFS Score: 80 % (5/29/2024 12:37 PM)    -6.25 % improvement    Plan: Discharge skilled Physical Therapy        Patient/Family/Caregiver was advised of these findings, precautions, and treatment options and has agreed to actively participate in planning and for this course of care.    Thank you for your referral. If you have any questions, please contact me at Dept: [phone].    Sincerely,  Electronically signed by therapist: Amanda Graham PT     Physician's certification required:  Yes  Please co-sign or sign and return this letter via fax as soon as possible to [phone].   I certify the need for these services furnished under this plan of treatment and while under my care.    X___________________________________________________ Date____________________    Certification From: 5/29/2024  To:8/27/2024               Charges: NMES: 3;        Total Timed Treatment: 45 min  Total Treatment Time: 45 min

## 2024-05-31 ENCOUNTER — LAB ENCOUNTER (OUTPATIENT)
Dept: LAB | Age: 74
End: 2024-05-31
Attending: INTERNAL MEDICINE
Payer: MEDICARE

## 2024-05-31 ENCOUNTER — OFFICE VISIT (OUTPATIENT)
Dept: INTERNAL MEDICINE CLINIC | Facility: CLINIC | Age: 74
End: 2024-05-31
Payer: MEDICARE

## 2024-05-31 VITALS
WEIGHT: 139 LBS | SYSTOLIC BLOOD PRESSURE: 136 MMHG | BODY MASS INDEX: 23.73 KG/M2 | DIASTOLIC BLOOD PRESSURE: 67 MMHG | HEIGHT: 64 IN | HEART RATE: 66 BPM

## 2024-05-31 DIAGNOSIS — M85.851 OSTEOPENIA OF BOTH HIPS: ICD-10-CM

## 2024-05-31 DIAGNOSIS — E78.5 HYPERLIPIDEMIA, UNSPECIFIED HYPERLIPIDEMIA TYPE: ICD-10-CM

## 2024-05-31 DIAGNOSIS — I10 HYPERTENSION, BENIGN: ICD-10-CM

## 2024-05-31 DIAGNOSIS — I42.2 HYPERTROPHIC CARDIOMYOPATHY (HCC): ICD-10-CM

## 2024-05-31 DIAGNOSIS — Z00.00 ENCOUNTER FOR ANNUAL HEALTH EXAMINATION: ICD-10-CM

## 2024-05-31 DIAGNOSIS — I27.20 PULMONARY HTN (HCC): ICD-10-CM

## 2024-05-31 DIAGNOSIS — I35.1 NONRHEUMATIC AORTIC VALVE INSUFFICIENCY: ICD-10-CM

## 2024-05-31 DIAGNOSIS — I48.20 ATRIAL FIBRILLATION, CHRONIC (HCC): ICD-10-CM

## 2024-05-31 DIAGNOSIS — N18.30 STAGE 3 CHRONIC KIDNEY DISEASE, UNSPECIFIED WHETHER STAGE 3A OR 3B CKD (HCC): ICD-10-CM

## 2024-05-31 DIAGNOSIS — Z00.00 MEDICARE ANNUAL WELLNESS VISIT, SUBSEQUENT: Primary | ICD-10-CM

## 2024-05-31 DIAGNOSIS — Z78.0 POSTMENOPAUSAL: ICD-10-CM

## 2024-05-31 DIAGNOSIS — I34.0 NONRHEUMATIC MITRAL VALVE REGURGITATION: ICD-10-CM

## 2024-05-31 DIAGNOSIS — Z12.31 VISIT FOR SCREENING MAMMOGRAM: ICD-10-CM

## 2024-05-31 DIAGNOSIS — M85.852 OSTEOPENIA OF BOTH HIPS: ICD-10-CM

## 2024-05-31 LAB
ALBUMIN SERPL-MCNC: 4.5 G/DL (ref 3.2–4.8)
ALBUMIN/GLOB SERPL: 1.5 {RATIO} (ref 1–2)
ALP LIVER SERPL-CCNC: 66 U/L
ALT SERPL-CCNC: 32 U/L
ANION GAP SERPL CALC-SCNC: 6 MMOL/L (ref 0–18)
AST SERPL-CCNC: 29 U/L (ref ?–34)
BASOPHILS # BLD AUTO: 0.03 X10(3) UL (ref 0–0.2)
BASOPHILS NFR BLD AUTO: 0.5 %
BILIRUB SERPL-MCNC: 0.8 MG/DL (ref 0.2–1.1)
BILIRUB UR QL: NEGATIVE
BUN BLD-MCNC: 22 MG/DL (ref 9–23)
BUN/CREAT SERPL: 19.1 (ref 10–20)
CALCIUM BLD-MCNC: 9.6 MG/DL (ref 8.7–10.4)
CHLORIDE SERPL-SCNC: 110 MMOL/L (ref 98–112)
CHOLEST SERPL-MCNC: 156 MG/DL (ref ?–200)
CLARITY UR: CLEAR
CO2 SERPL-SCNC: 27 MMOL/L (ref 21–32)
CREAT BLD-MCNC: 1.15 MG/DL
DEPRECATED RDW RBC AUTO: 41.3 FL (ref 35.1–46.3)
EGFRCR SERPLBLD CKD-EPI 2021: 50 ML/MIN/1.73M2 (ref 60–?)
EOSINOPHIL # BLD AUTO: 0.12 X10(3) UL (ref 0–0.7)
EOSINOPHIL NFR BLD AUTO: 1.8 %
ERYTHROCYTE [DISTWIDTH] IN BLOOD BY AUTOMATED COUNT: 12.4 % (ref 11–15)
FASTING PATIENT LIPID ANSWER: YES
FASTING STATUS PATIENT QL REPORTED: YES
GLOBULIN PLAS-MCNC: 3 G/DL (ref 2–3.5)
GLUCOSE BLD-MCNC: 101 MG/DL (ref 70–99)
GLUCOSE UR-MCNC: NORMAL MG/DL
HCT VFR BLD AUTO: 36.8 %
HDLC SERPL-MCNC: 47 MG/DL (ref 40–59)
HGB BLD-MCNC: 12.2 G/DL
HGB UR QL STRIP.AUTO: NEGATIVE
IMM GRANULOCYTES # BLD AUTO: 0.04 X10(3) UL (ref 0–1)
IMM GRANULOCYTES NFR BLD: 0.6 %
KETONES UR-MCNC: NEGATIVE MG/DL
LDLC SERPL CALC-MCNC: 90 MG/DL (ref ?–100)
LEUKOCYTE ESTERASE UR QL STRIP.AUTO: NEGATIVE
LYMPHOCYTES # BLD AUTO: 1.02 X10(3) UL (ref 1–4)
LYMPHOCYTES NFR BLD AUTO: 15.4 %
MCH RBC QN AUTO: 30.4 PG (ref 26–34)
MCHC RBC AUTO-ENTMCNC: 33.2 G/DL (ref 31–37)
MCV RBC AUTO: 91.8 FL
MONOCYTES # BLD AUTO: 0.51 X10(3) UL (ref 0.1–1)
MONOCYTES NFR BLD AUTO: 7.7 %
NEUTROPHILS # BLD AUTO: 4.9 X10 (3) UL (ref 1.5–7.7)
NEUTROPHILS # BLD AUTO: 4.9 X10(3) UL (ref 1.5–7.7)
NEUTROPHILS NFR BLD AUTO: 74 %
NITRITE UR QL STRIP.AUTO: NEGATIVE
NONHDLC SERPL-MCNC: 109 MG/DL (ref ?–130)
OSMOLALITY SERPL CALC.SUM OF ELEC: 299 MOSM/KG (ref 275–295)
PH UR: 5 [PH] (ref 5–8)
PLATELET # BLD AUTO: 215 10(3)UL (ref 150–450)
POTASSIUM SERPL-SCNC: 4.8 MMOL/L (ref 3.5–5.1)
PROT SERPL-MCNC: 7.5 G/DL (ref 5.7–8.2)
PROT UR-MCNC: NEGATIVE MG/DL
RBC # BLD AUTO: 4.01 X10(6)UL
SODIUM SERPL-SCNC: 143 MMOL/L (ref 136–145)
SP GR UR STRIP: 1.01 (ref 1–1.03)
T4 FREE SERPL-MCNC: 1.1 NG/DL (ref 0.8–1.7)
TRIGL SERPL-MCNC: 101 MG/DL (ref 30–149)
TSI SER-ACNC: 2.46 MIU/ML (ref 0.55–4.78)
UROBILINOGEN UR STRIP-ACNC: NORMAL
VLDLC SERPL CALC-MCNC: 16 MG/DL (ref 0–30)
WBC # BLD AUTO: 6.6 X10(3) UL (ref 4–11)

## 2024-05-31 PROCEDURE — 36415 COLL VENOUS BLD VENIPUNCTURE: CPT

## 2024-05-31 PROCEDURE — 85025 COMPLETE CBC W/AUTO DIFF WBC: CPT

## 2024-05-31 PROCEDURE — 84443 ASSAY THYROID STIM HORMONE: CPT

## 2024-05-31 PROCEDURE — 81003 URINALYSIS AUTO W/O SCOPE: CPT

## 2024-05-31 PROCEDURE — 80061 LIPID PANEL: CPT

## 2024-05-31 PROCEDURE — 80053 COMPREHEN METABOLIC PANEL: CPT

## 2024-05-31 PROCEDURE — 99213 OFFICE O/P EST LOW 20 MIN: CPT | Performed by: INTERNAL MEDICINE

## 2024-05-31 PROCEDURE — 84439 ASSAY OF FREE THYROXINE: CPT

## 2024-05-31 PROCEDURE — G0439 PPPS, SUBSEQ VISIT: HCPCS | Performed by: INTERNAL MEDICINE

## 2024-06-03 ENCOUNTER — APPOINTMENT (OUTPATIENT)
Dept: PHYSICAL THERAPY | Age: 74
End: 2024-06-03
Payer: MEDICARE

## 2024-06-14 NOTE — PATIENT INSTRUCTIONS
Colleen Campos's SCREENING SCHEDULE   Tests on this list are recommended by your physician but may not be covered, or covered at this frequency, by your insurer.   Please check with your insurance carrier before scheduling to verify coverage.   PREVENTATIVE SERVICES FREQUENCY &  COVERAGE DETAILS LAST COMPLETION DATE   Diabetes Screening    Fasting Blood Sugar /  Glucose    One screening every 12 months if never tested or if previously tested but not diagnosed with pre-diabetes   One screening every 6 months if diagnosed with pre-diabetes Lab Results   Component Value Date     (H) 05/31/2024        Cardiovascular Disease Screening    Lipid Panel  Cholesterol  Lipoprotein (HDL)  Triglycerides Covered every 5 years for all Medicare beneficiaries without apparent signs or symptoms of cardiovascular disease Lab Results   Component Value Date    CHOLEST 156 05/31/2024    HDL 47 05/31/2024    LDL 90 05/31/2024    TRIG 101 05/31/2024         Electrocardiogram (EKG)   Covered if needed at Welcome to Medicare, and non-screening if indicated for medical reasons 07/30/2021      Ultrasound Screening for Abdominal Aortic Aneurysm (AAA) Covered once in a lifetime for one of the following risk factors   • Men who are 65-75 years old and have ever smoked   • Anyone with a family history -     Colorectal Cancer Screening  Covered for ages 50-85; only need ONE of the following:    Colonoscopy   Covered every 10 years    Covered every 2 years if patient is at high risk or previous colonoscopy was abnormal 08/11/2020    Health Maintenance   Topic Date Due   • Colorectal Cancer Screening  08/11/2030       Flexible Sigmoidoscopy   Covered every 4 years -    Fecal Occult Blood Test Covered annually -   Bone Density Screening    Bone density screening    Covered every 2 years after age 65 if diagnosed with risk of osteoporosis or estrogen deficiency.    Covered yearly for long-term glucocorticoid medication use (Steroids) Last  Dexa Scan:    XR DEXA BONE DENSITOMETRY (CPT=77080) 08/28/2023      No recommendations at this time   Pap and Pelvic    Pap   Covered every 2 years for women at normal risk; Annually if at high risk -  No recommendations at this time    Chlamydia Annually if high risk -  No recommendations at this time   Screening Mammogram    Mammogram     Recommend annually for all female patients aged 40 and older    One baseline mammogram covered for patients aged 35-39 08/28/2023    Health Maintenance   Topic Date Due   • Mammogram  08/28/2024       Immunizations    Influenza Covered once per flu season  Please get every year 09/20/2023  No recommendations at this time    Pneumococcal Each vaccine ([id number] & [id number]) covered once after 65 Prevnar 13: 04/22/2016    [id number]: 05/22/2019     No recommendations at this time    Hepatitis B One screening covered for patients with certain risk factors   -  No recommendations at this time    Tetanus Toxoid Not covered by Medicare Part B unless medically necessary (cut with metal); may be covered with your pharmacy prescription benefits -    Tetanus, Diptheria and Pertusis TD and TDaP Not covered by Medicare Part B -  No recommendations at this time    Zoster Not covered by Medicare Part B; may be covered with your pharmacy  prescription benefits -  Zoster Vaccines(1 of 2) Never done     Annual Monitoring of Persistent Medications (ACE/ARB, digoxin diuretics, anticonvulsants)    Potassium Annually Lab Results   Component Value Date    K 4.8 05/31/2024         Creatinine   Annually Lab Results   Component Value Date    CREATSERUM 1.15 (H) 05/31/2024         BUN Annually Lab Results   Component Value Date    BUN 22 05/31/2024       Drug Serum Conc Annually No results found for: \"DIGOXIN\", \"DIG\", \"VALP\"

## 2024-06-14 NOTE — PROGRESS NOTES
Subjective:   Colleen Campos is a 73 year old female who presents for a Medicare Subsequent Annual Wellness visit (Pt already had Initial Annual Wellness) and scheduled follow up of multiple significant but stable problems.   Htn  cardiomyopathy  HTN  Long standing history of hypertension     sympotms  :        Headache no  dizziness        no                             Blurred vision no  palpitaionsSyncope no  Chest pain  no  PND  Orthopnea no  Weakness  No  Low salt diet    yes    Compliance with exercise stays active  Compliance with medication yes                                              History/Other:   Fall Risk Assessment:   She has been screened for Falls and is High Risk. Fall Prevention information provided to patient in After Visit Summary.    Do you feel unsteady when standing or walking?: No  Do you worry about falling?: No  Have you fallen in the past year?: Yes  How many times have you fallen?: (P) 1  Were you injured?: (P) Yes     Cognitive Assessment:   She had a completely normal cognitive assessment - see flowsheet entries     Functional Ability/Status:   Colleen Campos has a completely normal functional assessment. See flowsheet for details.      Depression Screening (PHQ-2/PHQ-9): PHQ-2 SCORE: 0  , done 5/24/2024            Advanced Directives:   She does NOT have a Living Will. [Do you have a living will?: No]  She does NOT have a Power of  for Health Care. [Do you have a healthcare power of ?: No]  Discussed Advance Care Planning with patient (and family/surrogate if present). Standard forms made available to patient in After Visit Summary.      Patient Active Problem List   Diagnosis    Hypertension, benign    Pulmonary HTN (HCC)    Nonrheumatic mitral valve regurgitation    Atrial fibrillation, chronic (HCC)     Allergies:  She has No Known Allergies.    Current Medications:  Outpatient Medications Marked as Taking for the 5/31/24 encounter (Office Visit) with  Lynn Medina MD   Medication Sig    CAMZYOS 10 MG Oral Cap Take 10 mg by mouth daily.    furosemide 20 MG Oral Tab Take 1 tablet (20 mg total) by mouth daily. Take additional 20mg as needed for leg swelling/wt gain    enalapril 20 MG Oral Tab Take 1 tablet (20 mg total) by mouth 2 (two) times daily.    warfarin 5 MG Oral Tab TAKE ONE TABLET 4-DAYS WEEKLY AND TAKE ONE-HALF TABLET 3-DAYS WEEKLY OR PER ANTICOAGULATION CLINIC DIRECTION    ATORVASTATIN 10 MG Oral Tab TAKE 1 TABLET BY MOUTH EVERY DAY IN THE EVENING    METOPROLOL SUCCINATE 50 MG Oral Tablet 24 Hr TAKE 1 TABLET BY MOUTH EVERY DAY (Patient taking differently: every evening.)    Calcium Carbonate (CALCIUM 500 OR) Take 1 tablet by mouth daily as needed.      Multiple Vitamin (MULTI-DAY) Oral Tab Take by mouth nightly. take 1 tablet by ORAL route  every day with food        Medical History:  She  has a past medical history of Acute congestive heart failure, unspecified heart failure type (HCC) (7/30/2021), Acute on chronic heart failure with preserved ejection fraction (HCC) (8/24/2021), Arrhythmia, Atrial fibrillation (HCC), Chronic atrial fibrillation (HCC), High blood pressure, High cholesterol, headache, Irregular heart rate, Mitral valve regurgitation, Murmur (7/31/2018), Murmur (7/31/2018), Unspecified essential hypertension, and Vertigo.  Surgical History:  She  has a past surgical history that includes electrocardiogram, complete (05-); samy localization wire 1 site left (cpt=19281) (1998); samy localization wire 1 site right (cpt=19281) (1998); hysterectomy; colonoscopy; and cyst removal.   Family History:  Her family history includes Breast Cancer (age of onset: 36) in her sister; Cancer in her father; Diabetes in her maternal grandmother; Pulmonary Disease in her mother.  Social History:  She  reports that she quit smoking about 43 years ago. Her smoking use included cigarettes. She started smoking about 58 years ago. She has a 15 pack-year  smoking history. She has been exposed to tobacco smoke. She quit smokeless tobacco use about 22 years ago. She reports that she does not currently use alcohol. She reports that she does not use drugs.    Tobacco:  She smoked tobacco in the past but quit greater than 12 months ago.  Social History     Tobacco Use   Smoking Status Former    Current packs/day: 0.00    Average packs/day: 1 pack/day for 15.0 years (15.0 ttl pk-yrs)    Types: Cigarettes    Start date: 1966    Quit date: 1981    Years since quittin.3    Passive exposure: Past   Smokeless Tobacco Former    Quit date: 2001        CAGE Alcohol Screen:   CAGE screening score of 0 on 2024, showing low risk of alcohol abuse.      Patient Care Team:  Lynn Medina MD as PCP - General (Internal Medicine)  Andrea Solorzano PT as Physical Therapist (Physical Therapy)  Brianna Grossman PTA as Physical Therapy Assistant (Physical Therapy)  Scar Osborne MD (SURGERY, ORTHOPEDIC)    Review of Systems   Constitutional:  Negative for activity change, chills, fatigue and fever.   HENT:  Negative for ear discharge, nosebleeds, postnasal drip, rhinorrhea, sinus pressure and sore throat.    Eyes:  Negative for pain, discharge and redness.   Respiratory:  Negative for cough, chest tightness, shortness of breath and wheezing.    Cardiovascular:  Negative for chest pain, palpitations and leg swelling.   Gastrointestinal:  Negative for abdominal pain, blood in stool, constipation, diarrhea, nausea and vomiting.   Genitourinary:  Negative for difficulty urinating, dysuria, frequency, hematuria and urgency.   Musculoskeletal:  Negative for back pain, gait problem and joint swelling.   Skin:  Negative for rash.   Neurological:  Negative for syncope, weakness, light-headedness and headaches.   Psychiatric/Behavioral:  Negative for dysphoric mood. The patient is not nervous/anxious.          Objective:   Physical Exam  Constitutional:        General: She is not in acute distress.     Appearance: She is well-developed. She is not diaphoretic.   HENT:      Head: Normocephalic and atraumatic.      Right Ear: Ear canal normal.      Left Ear: Ear canal normal.      Nose: Nose normal.      Mouth/Throat:      Pharynx: No oropharyngeal exudate or posterior oropharyngeal erythema.   Eyes:      General: No scleral icterus.        Right eye: No discharge.         Left eye: No discharge.      Conjunctiva/sclera: Conjunctivae normal.      Pupils: Pupils are equal, round, and reactive to light.   Cardiovascular:      Rate and Rhythm: Normal rate. Rhythm irregular.      Heart sounds: Normal heart sounds. No murmur heard.  Pulmonary:      Effort: Pulmonary effort is normal. No respiratory distress.      Breath sounds: Normal breath sounds. No wheezing.   Abdominal:      General: Bowel sounds are normal.      Palpations: Abdomen is soft. There is no mass.      Tenderness: There is no abdominal tenderness. There is no guarding or rebound.      Hernia: No hernia is present.   Musculoskeletal:         General: No tenderness.   Skin:     General: Skin is warm and dry.      Findings: No lesion or rash.   Neurological:      Mental Status: She is alert.      Gait: Gait normal.   Psychiatric:         Behavior: Behavior normal.         Thought Content: Thought content normal.           /67 (BP Location: Left arm, Patient Position: Sitting, Cuff Size: adult)   Pulse 66   Ht 5' 4\" (1.626 m)   Wt 139 lb (63 kg)   BMI 23.86 kg/m²  Estimated body mass index is 23.86 kg/m² as calculated from the following:    Height as of this encounter: 5' 4\" (1.626 m).    Weight as of this encounter: 139 lb (63 kg).    Medicare Hearing Assessment:   Hearing Screening    Entry User: Ginny Razo CMA  Screening Method: Questionnaire  I have a problem hearing over the telephone: No I have trouble following the conversations when two or more people are talking at the same time: No   I  have trouble understanding things on the TV: No I have to strain to understand conversations: No   I have to worry about missing the telephone ring or doorbell: No I have trouble hearing conversations in a noisy background such as a crowded room or restaurant: No   I get confused about where sounds come from: No I misunderstand some words in a sentence and need to ask people to repeat themselves: No   I especially have trouble understanding the speech of women and children: No I have trouble understanding the speaker in a large room such as at a meeting or place of Manda[place]: No   Many people I talk to seem to mumble (or don't speak clearly): No People get annoyed because I misunderstand what they say: No   I misunderstand what others are saying and make inappropriate responses: No I avoid social activities because I cannot hear well and fear I will reply improperly: No   Family members and friends have told me they think I may have hearing loss: No                 Assessment & Plan:   Colleen Campos is a 73 year old female who presents for a Medicare Assessment.   (Z00.00) Medicare annual wellness visit, subsequent  (primary encounter diagnosis)  Plan: Patient is independent with [employer] screening   Colonoscopy, mammography, dexa scan  And routine eye exam advised.      (Z12.31) Visit for screening mammogram  Plan: [facility] 2D+3D SCREENING BILAT         (CPT=77067/71441)        .Breast exam.  Bilateral  No discrete palpable masses or tenderness    No nipple discharge  And no axillary adenopathy.  Self breast exam advised      (Z78.0) Postmenopausal  Plan: CANCELED: XR DEXA BONE DENSITOMETRY (CPT=77080)        Asymptomatic  monitor  Weight bearing exercises  and supplement vit D      (I35.1) Nonrheumatic aortic valve insufficiency  Plan: Asymptomatic  monitor      (I34.0) Nonrheumatic mitral valve regurgitation  Plan: Asymptomatic  monitor      (I27.20) Pulmonary HTN (HCC)  Plan: chornic stable    (E78.5)  Hyperlipidemia, unspecified hyperlipidemia type  Plan: CBC With Differential With Platelet, Comp         Metabolic Panel (14), Lipid Panel, TSH and Free        T4        Low cholesterol diet advised  Avoid saturated and trans fats       (N18.30) Stage 3 chronic kidney disease, unspecified whether stage 3a or 3b CKD (HCC)  Plan: chornic stable    (I10) Hypertension, benign  Plan: Urinalysis, Routine        /67 (BP Location: Left arm, Patient Position: Sitting, Cuff Size: adult)   Pulse 66   Ht 5' 4\" (1.626 m)   Wt 139 lb (63 kg)   BMI 23.86 kg/m²   Controlled monitor    (I48.20) Atrial fibrillation, chronic (HCC)  Plan: stable    (M85.851,  M85.852) Osteopenia of both hips  Plan: ENDOCRINOLOGY - INTERNAL        Follow up with endo  Supplement vit D    Hypertrophic cardiomyopathy  stable  followed by cardiology CHF clinic      The patient indicates understanding of these issues and agrees to the plan.  Reinforced healthy diet, lifestyle, and exercise.      No follow-ups on file.     Lynn Medina MD, 6/13/2024     Supplementary Documentation:   General Health:  In the past six months, have you lost more than 10 pounds without trying?: 2 - No  Has your appetite been poor?: No  Type of Diet: Balanced  How does the patient maintain a good energy level?: Stretching  How would you describe your daily physical activity?: Light  How would you describe your current health state?: Fair  How do you maintain positive mental well-being?: Social Interaction;Visiting Friends;Visiting Family  On a scale of 0 to 10, with 0 being no pain and 10 being severe pain, what is your pain level?: 1 - (Mild)  In the past six months, have you experienced urine leakage?: 1-Yes  At any time do you feel concerned for the safety/well-being of yourself and/or your children, in your home or elsewhere?: No  Have you had any immunizations at another office such as Influenza, Hepatitis B, Tetanus, or Pneumococcal?: No       Colleen LOPEZ  Andres's SCREENING SCHEDULE   Tests on this list are recommended by your physician but may not be covered, or covered at this frequency, by your insurer.   Please check with your insurance carrier before scheduling to verify coverage.   PREVENTATIVE SERVICES FREQUENCY &  COVERAGE DETAILS LAST COMPLETION DATE   Diabetes Screening    Fasting Blood Sugar /  Glucose    One screening every 12 months if never tested or if previously tested but not diagnosed with pre-diabetes   One screening every 6 months if diagnosed with pre-diabetes Lab Results   Component Value Date     (H) 05/31/2024        Cardiovascular Disease Screening    Lipid Panel  Cholesterol  Lipoprotein (HDL)  Triglycerides Covered every 5 years for all Medicare beneficiaries without apparent signs or symptoms of cardiovascular disease Lab Results   Component Value Date    CHOLEST 156 05/31/2024    HDL 47 05/31/2024    LDL 90 05/31/2024    TRIG 101 05/31/2024         Electrocardiogram (EKG)   Covered if needed at Welcome to Medicare, and non-screening if indicated for medical reasons 07/30/2021      Ultrasound Screening for Abdominal Aortic Aneurysm (AAA) Covered once in a lifetime for one of the following risk factors    Men who are 65-75 years old and have ever smoked    Anyone with a family history -     Colorectal Cancer Screening  Covered for ages 50-85; only need ONE of the following:    Colonoscopy   Covered every 10 years    Covered every 2 years if patient is at high risk or previous colonoscopy was abnormal 08/11/2020    Health Maintenance   Topic Date Due    Colorectal Cancer Screening  08/11/2030       Flexible Sigmoidoscopy   Covered every 4 years -    Fecal Occult Blood Test Covered annually -   Bone Density Screening    Bone density screening    Covered every 2 years after age 65 if diagnosed with risk of osteoporosis or estrogen deficiency.    Covered yearly for long-term glucocorticoid medication use (Steroids) Last Dexa  Scan:    XR DEXA BONE DENSITOMETRY (CPT=77080) 08/28/2023      No recommendations at this time   Pap and Pelvic    Pap   Covered every 2 years for women at normal risk; Annually if at high risk -  No recommendations at this time    Chlamydia Annually if high risk -  No recommendations at this time   Screening Mammogram    Mammogram     Recommend annually for all female patients aged 40 and older    One baseline mammogram covered for patients aged 35-39 08/28/2023    Health Maintenance   Topic Date Due    Mammogram  08/28/2024       Immunizations    Influenza Covered once per flu season  Please get every year 09/20/2023  No recommendations at this time    Pneumococcal Each vaccine ([id number] & [id number]) covered once after 65 Prevnar 13: 04/22/2016    [id number]: 05/22/2019     No recommendations at this time    Hepatitis B One screening covered for patients with certain risk factors   -  No recommendations at this time    Tetanus Toxoid Not covered by Medicare Part B unless medically necessary (cut with metal); may be covered with your pharmacy prescription benefits -    Tetanus, Diptheria and Pertusis TD and TDaP Not covered by Medicare Part B -  No recommendations at this time    Zoster Not covered by Medicare Part B; may be covered with your pharmacy  prescription benefits -  Zoster Vaccines(1 of 2) Never done     Annual Monitoring of Persistent Medications (ACE/ARB, digoxin diuretics, anticonvulsants)    Potassium Annually Lab Results   Component Value Date    K 4.8 05/31/2024         Creatinine   Annually Lab Results   Component Value Date    CREATSERUM 1.15 (H) 05/31/2024         BUN Annually Lab Results   Component Value Date    BUN 22 05/31/2024       Drug Serum Conc Annually No results found for: \"DIGOXIN\", \"DIG\", \"VALP\"

## 2024-07-03 ENCOUNTER — APPOINTMENT (OUTPATIENT)
Dept: CARDIOLOGY | Age: 74
End: 2024-07-03
Attending: INTERNAL MEDICINE

## 2024-07-03 DIAGNOSIS — I50.32 CHRONIC HEART FAILURE WITH PRESERVED EJECTION FRACTION  (CMD): ICD-10-CM

## 2024-07-03 DIAGNOSIS — I42.2 HYPERTROPHIC CARDIOMYOPATHY  (CMD): ICD-10-CM

## 2024-07-03 LAB
AV STENOSIS SEVERITY TEXT: NORMAL
AVI LVOT PEAK GRADIENT (LVOTMG): 1.4
LEFT INTERNAL DIMENSION IN SYSTOLE (LVSD): 1.4
LEFT VENTRICULAR INTERNAL DIMENSION IN DIASTOLE (LVDD): 2.1
LEFT VENTRICULAR POSTERIOR WALL IN END DIASTOLE (LVPW): 3.2
LV EF: NORMAL %
RV END SYSTOLIC LONGITUDINAL STRAIN FREE WALL (RVGS): 2

## 2024-07-03 PROCEDURE — 93321 DOPPLER ECHO F-UP/LMTD STD: CPT | Performed by: INTERNAL MEDICINE

## 2024-07-03 PROCEDURE — 93308 TTE F-UP OR LMTD: CPT | Performed by: INTERNAL MEDICINE

## 2024-07-03 PROCEDURE — 93325 DOPPLER ECHO COLOR FLOW MAPG: CPT | Performed by: INTERNAL MEDICINE

## 2024-07-05 ENCOUNTER — TELEPHONE (OUTPATIENT)
Dept: CARDIOLOGY | Age: 74
End: 2024-07-05

## 2024-07-05 DIAGNOSIS — I42.2 HYPERTROPHIC CARDIOMYOPATHY  (CMD): Primary | ICD-10-CM

## 2024-07-05 RX ORDER — MAVACAMTEN 10 MG/1
10 CAPSULE, GELATIN COATED ORAL DAILY
Qty: 30 CAPSULE | Refills: 11 | Status: SHIPPED | OUTPATIENT
Start: 2024-07-05

## 2024-08-05 ENCOUNTER — OFFICE VISIT (OUTPATIENT)
Dept: ENDOCRINOLOGY CLINIC | Facility: CLINIC | Age: 74
End: 2024-08-05
Payer: MEDICARE

## 2024-08-05 VITALS
BODY MASS INDEX: 23.9 KG/M2 | WEIGHT: 140 LBS | SYSTOLIC BLOOD PRESSURE: 162 MMHG | HEART RATE: 67 BPM | DIASTOLIC BLOOD PRESSURE: 76 MMHG | HEIGHT: 64 IN

## 2024-08-05 DIAGNOSIS — R79.89 LOW VITAMIN D LEVEL: ICD-10-CM

## 2024-08-05 DIAGNOSIS — E78.5 DYSLIPIDEMIA: ICD-10-CM

## 2024-08-05 DIAGNOSIS — I10 HYPERTENSION, BENIGN: ICD-10-CM

## 2024-08-05 DIAGNOSIS — Z79.01 ON WARFARIN THERAPY: ICD-10-CM

## 2024-08-05 DIAGNOSIS — M81.0 AGE-RELATED OSTEOPOROSIS WITHOUT CURRENT PATHOLOGICAL FRACTURE: Primary | ICD-10-CM

## 2024-08-05 DIAGNOSIS — I10 HYPERTENSION, UNSPECIFIED TYPE: ICD-10-CM

## 2024-08-05 DIAGNOSIS — I48.20 ATRIAL FIBRILLATION, CHRONIC (HCC): ICD-10-CM

## 2024-08-05 PROCEDURE — 99205 OFFICE O/P NEW HI 60 MIN: CPT | Performed by: INTERNAL MEDICINE

## 2024-08-05 NOTE — PROGRESS NOTES
New Patient Evaluation - History and Physical    CONSULT - Reason for Visit:    osteopenia on DXA in 8/2023 FRAX 13%, and 3.2%.      Requesting Physician:   Katherine Medina MD    CHIEF COMPLAINT:    Chief Complaint   Patient presents with    Consult     Osteopenia.        HISTORY OF PRESENT ILLNESS:   Colleen Campos is a 73 year old female who presents with Osteoporosis    Had DXA scan in 8/2023, showed osteopenia w/ FRAX 13%, and 3.2%.        Fragility fracture: jan 2024 broke ankle  Height loss: 3 inches   Denied risk factors: steroid use, alcohol abuse, liver disease, kidney disease, hyperthyroid, seizure medications.   Family history of osteoporosis or fragility fracture:  mother     Patient is on fall precaution.  Patient is taking Vitamin D and calcium.   Educated the patient to do wt-bearing exercise, but avoid falls.  Used to exercise but no longer. Walks when weather allows       Last dental appt ~2022    ASSESSMENT AND PLAN:  Colleen Campos is a 73 year old female who presents with  osteopenia on DXA in 8/2023 FRAX 13%, and 3.2%.      RTC 2 mo  We need dental clearance before we start treatment with Reclast   Will get labs before next visit   Take vitamin D3 2000 international unit a day and Calcium 600 mg twice a day or 3 servings of dairy a day   Do weight bearing exercise   Follow fall precautions   educational material     https://www.[employer]/contents/medicines-for-osteoporosis-the-basics?search=osteoporosis&source=search_result&selectedTitle=2~150&usage_type=default&display_rank=2    https://www.[employer]/contents/osteoporosis-the-basics?search=osteoporosis&source=search_result&selectedTitle=1~150&usage_type=default&display_rank=1    https://www.[employer]/contents/zoledronic-acid-patient-drug-information?search=zoledronic%20acid&source=panel_search_result&selectedTitle=1~148&usage_type=panel&kp_tab=drug_patient&display_rank=1    We discussed diagnosis, treatment options, fall risk, long  term complications and side effect.   Discussed options oral bisphosphonate vs infusion bisphosphonate vs Denosumab vs evenity vs forteo    Side effect from osteoporosis meds   Hypocalcemia: Adequately supplement calcium and vitamin D during  Osteonecrosis of the Jaw: Monitor for symptoms.   Atypical Femoral Fracture: Evaluate new or unusual thigh, hip, or groin    PAST MEDICAL HISTORY:   Past Medical History:    Acute congestive heart failure, unspecified heart failure type (HCC)    Acute on chronic heart failure with preserved ejection fraction (HCC)    Arrhythmia    Atrial fibrillation (HCC)    Chronic atrial fibrillation (HCC)    High blood pressure    High cholesterol    Hx of headache    Irregular heart rate    Mitral valve regurgitation    Murmur    Murmur    Unspecified essential hypertension    Vertigo     PAST SURGICAL HISTORY:   Past Surgical History:   Procedure Laterality Date    Colonoscopy      Cyst removal      bilateral breasts    Electrocardiogram, complete  05-    SCANNED TO MEDIA TAB: 05-    Hysterectomy      age 35    Dasha localization wire 1 site left (cpt=19281)  1998    Dasha localization wire 1 site right (cpt=19281)  1998     CURRENT MEDICATIONS:     CAMZYOS 10 MG Oral Cap Take 10 mg by mouth daily.      furosemide 20 MG Oral Tab Take 1 tablet (20 mg total) by mouth daily. Take additional 20mg as needed for leg swelling/wt gain 110 tablet 3    enalapril 20 MG Oral Tab Take 1 tablet (20 mg total) by mouth 2 (two) times daily. 180 tablet 1    warfarin 5 MG Oral Tab TAKE ONE TABLET 4-DAYS WEEKLY AND TAKE ONE-HALF TABLET 3-DAYS WEEKLY OR PER ANTICOAGULATION CLINIC DIRECTION 270 tablet 1    ATORVASTATIN 10 MG Oral Tab TAKE 1 TABLET BY MOUTH EVERY DAY IN THE EVENING 90 tablet 2    METOPROLOL SUCCINATE 50 MG Oral Tablet 24 Hr TAKE 1 TABLET BY MOUTH EVERY DAY (Patient taking differently: every evening.) 90 tablet 3    Calcium Carbonate (CALCIUM 500 OR) Take 1 tablet by mouth daily as  needed.        Multiple Vitamin (MULTI-DAY) Oral Tab Take by mouth nightly. take 1 tablet by ORAL route  every day with food          ALLERGIES:  No Known Allergies  no  SOCIAL HISTORY:    Social History     Socioeconomic History    Marital status:    Tobacco Use    Smoking status: Former     Current packs/day: 0.00     Average packs/day: 1 pack/day for 15.0 years (15.0 ttl pk-yrs)     Types: Cigarettes     Start date: 1966     Quit date: 1981     Years since quittin.5     Passive exposure: Past    Smokeless tobacco: Former     Quit date: 2001   Vaping Use    Vaping status: Never Used   Substance and Sexual Activity    Alcohol use: Not Currently     Comment: rare    Drug use: No   Other Topics Concern    Caffeine Concern No   Coffee , decaf , 1 a day   Smoking no  Marijuana no  Etoh no  Drugs no  FAMILY HISTORY:   Family History   Problem Relation Age of Onset    Pulmonary Disease Mother         COPD; Cause of death    Cancer Father         Liver cancer    Diabetes Maternal Grandmother     Breast Cancer Sister 36        REVIEW OF SYSTEMS:  All negative other than HPI    PHYSICAL EXAM:   Height: 5' 4\" (162.6 cm) (1427)  Weight: 140 lb (63.5 kg) (1427)  BSA (Calculated - sq m): 1.68 sq meters (1427)  Pulse: 72 (1427)  BP: 177/71 (1427)  Temp: --  Do Not Use - Resp Rate: --  SpO2: --    Body mass index is 24.03 kg/m².  Kyphosis   No spine tenderness   CONSTITUTIONAL:  Awake and alert. Age appropriate, good hygiene not in acute distress. Well-nourished and well developed. no acute distress   PSYCH:   Orientated to time, place, person & situation, Normal mood and affect, memory intact, normal insight and judgment, cooperative  Neuro: speech is clear. Awake, alert, no aphasia, no facial asymmetry, no nuchal rigidity  EYES:  No proptosis, no ptosis, conjunctiva normal  ENT:  Normocephalic, atraumatic  Eye: EOMI, normal lids, no discharge, no conjunctival erythema. No  exophthalmos/proptosis, Ptosis negative   No rhinorrhea, moist oral mucosa  Neck: full range of motion  Neck/Thyroid: neck inspection: normal, No scar, No goiter   LUNGS:  No acute respiratory distress, non-labored respiration. Speaking full sentences  CARDIOVASCULAR:  regular rate   ABDOMEN:  No abdominal pain.   SKIN:  no bruising or bleeding, no rashes and no lesions, Skin is dry, no obvious rashes or lesions  EXTREMITIES: no gross abnormality   MSK: Moves extremities spontaneously. full range of motion in all major joints      DATA:     Pertinent data reviewed   DXA 8/2023 osteopenia   decrease in the BMD by 10.2% from previous study.   There has been decrease in the BMD by 14.7% from previous study.  The 10 year fracture risk for major osteoporotic fracture is 13%, and for hip fracture is 3.2%.        Latest Reference Range & Units 05/31/24 11:17   CREATININE 0.55 - 1.02 mg/dL 1.15 (H)   CALCIUM 8.7 - 10.4 mg/dL 9.6   BUN/CREATININE RATIO 10.0 - 20.0  19.1   EGFR >=60 mL/min/1.73m2 50 (L)      Latest Reference Range & Units 05/31/24 11:17   T4,Free (Direct) 0.8 - 1.7 ng/dL 1.1   TSH 0.550 - 4.780 mIU/mL 2.459     No results for input(s): \"TSH\", \"T4F\", \"T3F\", \"THYP\" in the last 72 hours.  No results found.    Orders Placed This Encounter   Procedures    Vitamin D, 25-Hydroxy [Q]    Basic Metabolic Panel (8)     Orders Placed This Encounter    Vitamin D, 25-Hydroxy [Q]     Standing Status:   Future     Standing Expiration Date:   8/5/2025     Order Specific Question:   Release to patient     Answer:   Immediate    Basic Metabolic Panel (8)     Standing Status:   Future     Standing Expiration Date:   8/5/2025     Order Specific Question:   Release to patient     Answer:   Immediate          This is a specialized patient consultation in endocrinology and required comprehensive review of prior records, as well as current evaluation, with time required for consideration of complex endocrine issues and consultation.  For this visit, I personally interviewed the patient, and family member if accompanied, performed the pertinent parts of the history and physical examination. ROS included screening for appropriate endocrine conditions.   Today's diagnosis and plan were reviewed in detail with the patient who states understanding and agrees with plan. I discussed with the patient possible diagnosis, differential diagnosis, need for work up, treatment options, alternatives and side effects.     Please see note for details about time spent which includes:   · pre-visit preparation  · reviewing records  · face to face time with the patient   · timely documentation of the encounter  · ordering medications/tests  · communication with care team  · care coordination    I appreciate the opportunity to be part of your patient's medical care and will keep you, as the referring and primary physicians, informed about the care of your patient. Please feel free to contact me should you have any questions.    The 21st Century Cures Act makes medical notes like these available to patients in the interest of transparency. Please be advised this is a medical document. Medical documents are intended to carry relevant information, facts as evident, and the clinical opinion of the practitioner. The medical note is intended as peer to peer communication and may appear blunt or direct. It is written in medical language and may contain abbreviations or verbiage that are unfamiliar.   Maggie Cochran MD

## 2024-08-05 NOTE — PATIENT INSTRUCTIONS
osteopenia on DXA in 8/2023 FRAX 13%, and 3.2%.      We need dental clearance before we start treatment with Reclast   Will get labs before next visit   Take vitamin D3 2000 international unit a day and Calcium 600 mg twice a day or 3 servings of dairy a day   Do weight bearing exercise   Follow fall precautions   educational material     https://www.[employer]/contents/medicines-for-osteoporosis-the-basics?search=osteoporosis&source=search_result&selectedTitle=2~150&usage_type=default&display_rank=2    https://www.[employer]/contents/osteoporosis-the-basics?search=osteoporosis&source=search_result&selectedTitle=1~150&usage_type=default&display_rank=1    https://www.[employer]/contents/zoledronic-acid-patient-drug-information?search=zoledronic%20acid&source=panel_search_result&selectedTitle=1~148&usage_type=panel&kp_tab=drug_patient&display_rank=1    We discussed diagnosis, treatment options, fall risk, long term complications and side effect.   Discussed options oral bisphosphonate vs infusion bisphosphonate vs Denosumab vs evenity vs forteo    Side effect from osteoporosis meds   Hypocalcemia: Adequately supplement calcium and vitamin D during  Osteonecrosis of the Jaw: Monitor for symptoms.   Atypical Femoral Fracture: Evaluate new or unusual thigh, hip, or groin

## 2024-08-28 ENCOUNTER — HOSPITAL ENCOUNTER (OUTPATIENT)
Dept: MAMMOGRAPHY | Facility: HOSPITAL | Age: 74
Discharge: HOME | End: 2024-08-28
Attending: INTERNAL MEDICINE
Payer: MEDICARE

## 2024-08-28 DIAGNOSIS — Z12.31 VISIT FOR SCREENING MAMMOGRAM: ICD-10-CM

## 2024-08-28 PROCEDURE — 77063 BREAST TOMOSYNTHESIS BI: CPT | Performed by: INTERNAL MEDICINE

## 2024-08-28 PROCEDURE — 77067 SCR MAMMO BI INCL CAD: CPT | Performed by: INTERNAL MEDICINE

## 2024-09-24 ENCOUNTER — APPOINTMENT (OUTPATIENT)
Dept: CARDIOLOGY | Age: 74
End: 2024-09-24
Attending: INTERNAL MEDICINE

## 2024-09-25 ENCOUNTER — APPOINTMENT (OUTPATIENT)
Dept: CARDIOLOGY | Age: 74
End: 2024-09-25
Attending: INTERNAL MEDICINE

## 2024-09-25 ENCOUNTER — OFFICE VISIT (OUTPATIENT)
Dept: CARDIOLOGY | Age: 74
End: 2024-09-25

## 2024-09-25 VITALS
SYSTOLIC BLOOD PRESSURE: 144 MMHG | WEIGHT: 137.02 LBS | BODY MASS INDEX: 24.27 KG/M2 | DIASTOLIC BLOOD PRESSURE: 59 MMHG | HEART RATE: 57 BPM | OXYGEN SATURATION: 96 %

## 2024-09-25 DIAGNOSIS — I42.2 HYPERTROPHIC CARDIOMYOPATHY  (CMD): ICD-10-CM

## 2024-09-25 DIAGNOSIS — I42.2 HYPERTROPHIC CARDIOMYOPATHY  (CMD): Primary | ICD-10-CM

## 2024-09-25 LAB
ASCENDING AORTA (AAD): 8
AV STENOSIS SEVERITY TEXT: NORMAL
AVI LVOT PEAK GRADIENT (LVOTMG): 1
LEFT INTERNAL DIMENSION IN SYSTOLE (LVSD): 1
LEFT VENTRICULAR INTERNAL DIMENSION IN DIASTOLE (LVDD): 3.6
LEFT VENTRICULAR POSTERIOR WALL IN END DIASTOLE (LVPW): 4.4
LV EF: NORMAL %
RV END SYSTOLIC LONGITUDINAL STRAIN FREE WALL (RVGS): 2
TRICUSPID VALVE ANNULAR PEAK VELOCITY (TVAPV): 67

## 2024-09-25 PROCEDURE — 93308 TTE F-UP OR LMTD: CPT | Performed by: INTERNAL MEDICINE

## 2024-09-25 PROCEDURE — 93321 DOPPLER ECHO F-UP/LMTD STD: CPT | Performed by: INTERNAL MEDICINE

## 2024-09-25 PROCEDURE — 93325 DOPPLER ECHO COLOR FLOW MAPG: CPT | Performed by: INTERNAL MEDICINE

## 2024-09-25 SDOH — HEALTH STABILITY: PHYSICAL HEALTH: ON AVERAGE, HOW MANY DAYS PER WEEK DO YOU ENGAGE IN MODERATE TO STRENUOUS EXERCISE (LIKE A BRISK WALK)?: 0 DAYS

## 2024-09-25 SDOH — HEALTH STABILITY: PHYSICAL HEALTH: ON AVERAGE, HOW MANY MINUTES DO YOU ENGAGE IN EXERCISE AT THIS LEVEL?: 0 MIN

## 2024-09-25 ASSESSMENT — ENCOUNTER SYMPTOMS
WEIGHT LOSS: 1
ANOREXIA: 0
BLOATING: 0
ABDOMINAL PAIN: 0
DEPRESSION: 0
LOSS OF BALANCE: 1
SHORTNESS OF BREATH: 0
LIGHT-HEADEDNESS: 0
HEADACHES: 0
DIZZINESS: 0
INSOMNIA: 0

## 2024-09-25 ASSESSMENT — PATIENT HEALTH QUESTIONNAIRE - PHQ9
2. FEELING DOWN, DEPRESSED OR HOPELESS: NOT AT ALL
SUM OF ALL RESPONSES TO PHQ9 QUESTIONS 1 AND 2: 0
1. LITTLE INTEREST OR PLEASURE IN DOING THINGS: NOT AT ALL
SUM OF ALL RESPONSES TO PHQ9 QUESTIONS 1 AND 2: 0
CLINICAL INTERPRETATION OF PHQ2 SCORE: NO FURTHER SCREENING NEEDED

## 2024-09-26 ENCOUNTER — TELEPHONE (OUTPATIENT)
Dept: CARDIOLOGY | Age: 74
End: 2024-09-26

## 2024-09-26 ENCOUNTER — E-ADVICE (OUTPATIENT)
Dept: CARDIOLOGY | Age: 74
End: 2024-09-26

## 2024-10-11 ENCOUNTER — APPOINTMENT (OUTPATIENT)
Dept: CARDIOLOGY | Age: 74
End: 2024-10-11
Attending: INTERNAL MEDICINE

## 2024-10-11 DIAGNOSIS — I42.2 HYPERTROPHIC CARDIOMYOPATHY  (CMD): ICD-10-CM

## 2024-10-11 DIAGNOSIS — I50.32 CHRONIC HEART FAILURE WITH PRESERVED EJECTION FRACTION  (CMD): ICD-10-CM

## 2024-10-16 ENCOUNTER — APPOINTMENT (OUTPATIENT)
Dept: CARDIOLOGY | Age: 74
End: 2024-10-16
Attending: INTERNAL MEDICINE

## 2024-10-16 DIAGNOSIS — I42.2 HYPERTROPHIC CARDIOMYOPATHY  (CMD): ICD-10-CM

## 2024-10-16 LAB
ASCENDING AORTA (AAD): 3
AV STENOSIS SEVERITY TEXT: NORMAL
AVI LVOT PEAK GRADIENT (LVOTMG): 1.2
LEFT INTERNAL DIMENSION IN SYSTOLE (LVSD): 1.6
LEFT VENTRICULAR INTERNAL DIMENSION IN DIASTOLE (LVDD): 3
LEFT VENTRICULAR POSTERIOR WALL IN END DIASTOLE (LVPW): 4.3
LV EF: NORMAL %
RV END SYSTOLIC LONGITUDINAL STRAIN FREE WALL (RVGS): 2
TRICUSPID VALVE ANNULAR PEAK VELOCITY (TVAPV): 32
TRICUSPID VALVE PEAK REGURGITATION VELOCITY (TRPV): 2.7

## 2024-10-16 PROCEDURE — 93308 TTE F-UP OR LMTD: CPT | Performed by: INTERNAL MEDICINE

## 2024-10-16 PROCEDURE — 93321 DOPPLER ECHO F-UP/LMTD STD: CPT | Performed by: INTERNAL MEDICINE

## 2024-10-16 PROCEDURE — 93325 DOPPLER ECHO COLOR FLOW MAPG: CPT | Performed by: INTERNAL MEDICINE

## 2024-10-17 PROCEDURE — 93227 XTRNL ECG REC<48 HR R&I: CPT | Performed by: INTERNAL MEDICINE

## 2024-10-18 ENCOUNTER — TELEPHONE (OUTPATIENT)
Dept: CARDIOLOGY | Age: 74
End: 2024-10-18

## 2024-10-18 RX ORDER — MAVACAMTEN 2.5 MG/1
2.5 CAPSULE, GELATIN COATED ORAL DAILY
Qty: 30 CAPSULE | Refills: 11 | Status: SHIPPED | OUTPATIENT
Start: 2024-10-18

## 2024-10-21 ENCOUNTER — APPOINTMENT (OUTPATIENT)
Dept: CARDIOLOGY | Age: 74
End: 2024-10-21

## 2024-10-21 VITALS
SYSTOLIC BLOOD PRESSURE: 135 MMHG | WEIGHT: 135.47 LBS | BODY MASS INDEX: 24 KG/M2 | HEART RATE: 59 BPM | DIASTOLIC BLOOD PRESSURE: 68 MMHG | OXYGEN SATURATION: 99 %

## 2024-10-21 DIAGNOSIS — I50.32 CHRONIC HEART FAILURE WITH PRESERVED EJECTION FRACTION  (CMD): Primary | ICD-10-CM

## 2024-10-21 DIAGNOSIS — I42.2 HYPERTROPHIC CARDIOMYOPATHY  (CMD): ICD-10-CM

## 2024-10-21 PROCEDURE — 99215 OFFICE O/P EST HI 40 MIN: CPT | Performed by: INTERNAL MEDICINE

## 2024-10-21 SDOH — HEALTH STABILITY: PHYSICAL HEALTH: ON AVERAGE, HOW MANY DAYS PER WEEK DO YOU ENGAGE IN MODERATE TO STRENUOUS EXERCISE (LIKE A BRISK WALK)?: 0 DAYS

## 2024-10-21 SDOH — HEALTH STABILITY: PHYSICAL HEALTH: ON AVERAGE, HOW MANY MINUTES DO YOU ENGAGE IN EXERCISE AT THIS LEVEL?: 0 MIN

## 2024-10-21 ASSESSMENT — ENCOUNTER SYMPTOMS
INSOMNIA: 0
WEIGHT LOSS: 1
ANOREXIA: 0
LOSS OF BALANCE: 1
DEPRESSION: 0
ABDOMINAL PAIN: 0
DIZZINESS: 0
SHORTNESS OF BREATH: 0
LIGHT-HEADEDNESS: 0
BLOATING: 0
HEADACHES: 0

## 2024-10-24 ENCOUNTER — EXTERNAL LAB (OUTPATIENT)
Dept: HEALTH INFORMATION MANAGEMENT | Facility: OTHER | Age: 74
End: 2024-10-24

## 2024-10-24 LAB
BASOPHILS # BLD: 0.02 10^3/UL (ref 0–0.1)
BASOPHILS NFR BLD: 0.4 %
BUN SERPL-MCNC: 26 MG/DL (ref 6–20)
CALCIUM SERPL-MCNC: 9.7 MG/DL (ref 8.6–10.3)
CHLORIDE SERPL-SCNC: 102 MMOL/L (ref 98–107)
CO2 SERPL-SCNC: 23.9 MMOL/L (ref 22–29)
CREAT SERPL-MCNC: 1.07 MG/DL (ref 0.5–0.9)
EOSINOPHIL # BLD: 0.14 10^3/UL (ref 0–0.3)
EOSINOPHIL NFR BLD: 3 %
ERYTHROCYTE [DISTWIDTH] IN BLOOD: 12.8 % (ref 11.5–16)
GFR SERPLBLD SCHWARTZ-ARVRAT: 55 ML/MIN/1.73
GLUCOSE SERPL-MCNC: 94 MG/DL (ref 74–109)
HCT VFR BLD CALC: 39.4 % (ref 34–50)
HGB BLD-MCNC: 12.8 G/DL (ref 12–16)
LAB RESULT: NORMAL
LENGTH OF FAST TIME PATIENT: YES H
LYMPHOCYTES # BLD: 0.82 10^3/UL (ref 0.9–4)
LYMPHOCYTES NFR BLD: 17.5 %
MCH RBC QN AUTO: 28.4 PG (ref 27–33.2)
MCHC RBC AUTO-ENTMCNC: 32.5 G/DL (ref 31–37)
MCV RBC AUTO: 87.6 FL (ref 81–100)
MONOCYTES # BLD: 0.43 10^3/UL (ref 0.1–1)
MONOCYTES NFR BLD: 9.2 %
NEUTROPHILS # BLD: 3.27 10^3/UL (ref 1.3–6.7)
NEUTROPHILS NFR BLD: 69.9 %
NRBC # BLD: 0 10^3/UL (ref 0–0.01)
NRBC BLD MANUAL-RTO: 0 %
PLATELET # BLD: 170 10^3/UL (ref 150–450)
PMV BLD AUTO: 11.7 FL (ref 7–11.5)
POTASSIUM SERPL-SCNC: 5.1 MMOL/L (ref 3.5–5.2)
RBC # BLD: 4.5 10^6/UL (ref 3.8–5.1)
SODIUM SERPL-SCNC: 139 MMOL/L (ref 136–145)
WBC # BLD: 4.68 10^3/UL (ref 4–13)

## 2024-10-25 ENCOUNTER — APPOINTMENT (OUTPATIENT)
Dept: CARDIOLOGY | Age: 74
End: 2024-10-25
Attending: INTERNAL MEDICINE

## 2024-10-31 ENCOUNTER — EXTERNAL LAB (OUTPATIENT)
Dept: HEALTH INFORMATION MANAGEMENT | Facility: OTHER | Age: 74
End: 2024-10-31

## 2024-10-31 LAB — LAB RESULT: NORMAL

## 2024-11-04 ENCOUNTER — HOSPITAL ENCOUNTER (OUTPATIENT)
Dept: CARDIOLOGY | Age: 74
Discharge: HOME | End: 2024-11-04
Attending: INTERNAL MEDICINE

## 2024-11-04 VITALS
BODY MASS INDEX: 23.22 KG/M2 | RESPIRATION RATE: 18 BRPM | HEIGHT: 64 IN | SYSTOLIC BLOOD PRESSURE: 146 MMHG | OXYGEN SATURATION: 96 % | DIASTOLIC BLOOD PRESSURE: 55 MMHG | HEART RATE: 81 BPM | WEIGHT: 136.02 LBS | TEMPERATURE: 97.6 F

## 2024-11-04 DIAGNOSIS — I48.0 PAF (PAROXYSMAL ATRIAL FIBRILLATION)  (CMD): ICD-10-CM

## 2024-11-04 DIAGNOSIS — I34.0 NON-RHEUMATIC MITRAL REGURGITATION: ICD-10-CM

## 2024-11-04 DIAGNOSIS — I50.32 CHRONIC HEART FAILURE WITH PRESERVED EJECTION FRACTION  (CMD): ICD-10-CM

## 2024-11-04 PROCEDURE — 99153 MOD SED SAME PHYS/QHP EA: CPT

## 2024-11-04 PROCEDURE — 93320 DOPPLER ECHO COMPLETE: CPT

## 2024-11-04 PROCEDURE — 10002800 HB RX 250 W HCPCS: Performed by: INTERNAL MEDICINE

## 2024-11-04 PROCEDURE — 99152 MOD SED SAME PHYS/QHP 5/>YRS: CPT

## 2024-11-04 PROCEDURE — 13000001 HB PHASE II RECOVERY EA 30 MINUTES

## 2024-11-04 RX ORDER — 0.9 % SODIUM CHLORIDE 0.9 %
10 VIAL (ML) INJECTION PRN
Status: DISCONTINUED | OUTPATIENT
Start: 2024-11-04 | End: 2024-11-05 | Stop reason: HOSPADM

## 2024-11-04 RX ORDER — MIDAZOLAM HYDROCHLORIDE 1 MG/ML
INJECTION, SOLUTION INTRAMUSCULAR; INTRAVENOUS PRN
Status: COMPLETED | OUTPATIENT
Start: 2024-11-04 | End: 2024-11-04

## 2024-11-04 RX ORDER — FLUMAZENIL 0.1 MG/ML
INJECTION, SOLUTION INTRAVENOUS
Status: DISCONTINUED
Start: 2024-11-04 | End: 2024-11-04 | Stop reason: WASHOUT

## 2024-11-04 RX ORDER — MIDAZOLAM HYDROCHLORIDE 5 MG/5ML
INJECTION, SOLUTION INTRAMUSCULAR; INTRAVENOUS
Status: DISPENSED
Start: 2024-11-04 | End: 2024-11-04

## 2024-11-04 RX ORDER — NALOXONE HCL 0.4 MG/ML
VIAL (ML) INJECTION
Status: DISCONTINUED
Start: 2024-11-04 | End: 2024-11-04 | Stop reason: WASHOUT

## 2024-11-04 RX ORDER — SODIUM CHLORIDE 9 MG/ML
INJECTION, SOLUTION INTRAVENOUS
Status: DISPENSED
Start: 2024-11-04 | End: 2024-11-04

## 2024-11-04 RX ORDER — 0.9 % SODIUM CHLORIDE 0.9 %
2 VIAL (ML) INJECTION EVERY 12 HOURS SCHEDULED
Status: DISCONTINUED | OUTPATIENT
Start: 2024-11-04 | End: 2024-11-05 | Stop reason: HOSPADM

## 2024-11-04 RX ADMIN — MIDAZOLAM HYDROCHLORIDE 1 MG: 1 INJECTION, SOLUTION INTRAMUSCULAR; INTRAVENOUS at 08:08

## 2024-11-04 RX ADMIN — MIDAZOLAM HYDROCHLORIDE 2 MG: 1 INJECTION, SOLUTION INTRAMUSCULAR; INTRAVENOUS at 08:02

## 2024-11-04 RX ADMIN — FENTANYL CITRATE 25 MCG: 50 INJECTION INTRAMUSCULAR; INTRAVENOUS at 08:02

## 2024-11-04 RX ADMIN — FENTANYL CITRATE 25 MCG: 50 INJECTION INTRAMUSCULAR; INTRAVENOUS at 08:15

## 2024-11-04 RX ADMIN — FENTANYL CITRATE 25 MCG: 50 INJECTION INTRAMUSCULAR; INTRAVENOUS at 08:04

## 2024-11-04 RX ADMIN — FENTANYL CITRATE 25 MCG: 50 INJECTION INTRAMUSCULAR; INTRAVENOUS at 08:27

## 2024-11-04 RX ADMIN — MIDAZOLAM HYDROCHLORIDE 1 MG: 1 INJECTION, SOLUTION INTRAMUSCULAR; INTRAVENOUS at 08:24

## 2024-11-04 RX ADMIN — MIDAZOLAM HYDROCHLORIDE 1 MG: 1 INJECTION, SOLUTION INTRAMUSCULAR; INTRAVENOUS at 08:04

## 2024-11-04 RX ADMIN — MIDAZOLAM HYDROCHLORIDE 1 MG: 1 INJECTION, SOLUTION INTRAMUSCULAR; INTRAVENOUS at 08:15

## 2024-11-04 ASSESSMENT — PAIN SCALES - GENERAL
PAINLEVEL_OUTOF10: 0

## 2024-11-13 ENCOUNTER — PREP FOR CASE (OUTPATIENT)
Dept: NEUROLOGY | Age: 74
End: 2024-11-13

## 2024-11-13 DIAGNOSIS — I42.2 HYPERTROPHIC CARDIOMYOPATHY  (CMD): Primary | ICD-10-CM

## 2024-11-13 RX ORDER — ASPIRIN 325 MG
325 TABLET ORAL ONCE
OUTPATIENT
Start: 2024-11-13 | End: 2024-11-13

## 2024-11-13 RX ORDER — CLONIDINE HYDROCHLORIDE 0.1 MG/1
0.1 TABLET ORAL EVERY 4 HOURS PRN
OUTPATIENT
Start: 2024-11-13 | End: 2024-11-14

## 2024-11-13 RX ORDER — 0.9 % SODIUM CHLORIDE 0.9 %
10 VIAL (ML) INJECTION PRN
OUTPATIENT
Start: 2024-11-13

## 2024-11-13 RX ORDER — 0.9 % SODIUM CHLORIDE 0.9 %
2 VIAL (ML) INJECTION EVERY 12 HOURS SCHEDULED
OUTPATIENT
Start: 2024-11-13

## 2024-11-13 RX ORDER — HYDRALAZINE HYDROCHLORIDE 20 MG/ML
10 INJECTION INTRAMUSCULAR; INTRAVENOUS EVERY 4 HOURS PRN
OUTPATIENT
Start: 2024-11-13 | End: 2024-11-14

## 2024-11-15 ENCOUNTER — APPOINTMENT (OUTPATIENT)
Dept: CARDIOLOGY | Age: 74
End: 2024-11-15
Attending: INTERNAL MEDICINE

## 2024-11-15 DIAGNOSIS — I42.2 HYPERTROPHIC CARDIOMYOPATHY  (CMD): ICD-10-CM

## 2024-11-15 LAB
ASCENDING AORTA (AAD): 3
AV STENOSIS SEVERITY TEXT: NORMAL
AVI LVOT PEAK GRADIENT (LVOTMG): 1.1
LEFT INTERNAL DIMENSION IN SYSTOLE (LVSD): 1.4
LEFT VENTRICULAR INTERNAL DIMENSION IN DIASTOLE (LVDD): 2.6
LEFT VENTRICULAR POSTERIOR WALL IN END DIASTOLE (LVPW): 3.9
LV EF: NORMAL %
RV END SYSTOLIC LONGITUDINAL STRAIN FREE WALL (RVGS): 1.7
TRICUSPID VALVE ANNULAR PEAK VELOCITY (TVAPV): 49

## 2024-11-15 PROCEDURE — 76376 3D RENDER W/INTRP POSTPROCES: CPT | Performed by: INTERNAL MEDICINE

## 2024-11-15 PROCEDURE — 93325 DOPPLER ECHO COLOR FLOW MAPG: CPT | Performed by: INTERNAL MEDICINE

## 2024-11-15 PROCEDURE — 93321 DOPPLER ECHO F-UP/LMTD STD: CPT | Performed by: INTERNAL MEDICINE

## 2024-11-15 PROCEDURE — 93308 TTE F-UP OR LMTD: CPT | Performed by: INTERNAL MEDICINE

## 2024-11-19 ENCOUNTER — HOSPITAL ENCOUNTER (OUTPATIENT)
Age: 74
Discharge: HOME | End: 2024-11-19
Attending: INTERNAL MEDICINE | Admitting: INTERNAL MEDICINE

## 2024-11-19 VITALS
TEMPERATURE: 97 F | DIASTOLIC BLOOD PRESSURE: 60 MMHG | BODY MASS INDEX: 23.15 KG/M2 | RESPIRATION RATE: 16 BRPM | OXYGEN SATURATION: 98 % | WEIGHT: 135.58 LBS | HEART RATE: 55 BPM | HEIGHT: 64 IN | SYSTOLIC BLOOD PRESSURE: 145 MMHG

## 2024-11-19 DIAGNOSIS — I42.2 HYPERTROPHIC CARDIOMYOPATHY  (CMD): ICD-10-CM

## 2024-11-19 LAB
ANION GAP SERPL CALC-SCNC: 6 MMOL/L (ref 7–19)
BUN SERPL-MCNC: 25 MG/DL (ref 6–20)
BUN/CREAT SERPL: 21 (ref 7–25)
CALCIUM SERPL-MCNC: 9.5 MG/DL (ref 8.4–10.2)
CHLORIDE SERPL-SCNC: 110 MMOL/L (ref 97–110)
CO2 SERPL-SCNC: 26 MMOL/L (ref 21–32)
CREAT SERPL-MCNC: 1.17 MG/DL (ref 0.51–0.95)
DEPRECATED RDW RBC: 43.3 FL (ref 39–50)
EGFRCR SERPLBLD CKD-EPI 2021: 49 ML/MIN/{1.73_M2}
ERYTHROCYTE [DISTWIDTH] IN BLOOD: 13.2 % (ref 11–15)
FASTING DURATION TIME PATIENT: ABNORMAL H
GLUCOSE SERPL-MCNC: 94 MG/DL (ref 70–99)
HCT VFR BLD CALC: 36.4 % (ref 36–46.5)
HGB BLD-MCNC: 11.7 G/DL (ref 12–15.5)
INR PPP: 1.2
MAGNESIUM SERPL-MCNC: 2.2 MG/DL (ref 1.7–2.4)
MCH RBC QN AUTO: 28.6 PG (ref 26–34)
MCHC RBC AUTO-ENTMCNC: 32.1 G/DL (ref 32–36.5)
MCV RBC AUTO: 89 FL (ref 78–100)
NRBC BLD MANUAL-RTO: 0 /100 WBC
PLATELET # BLD AUTO: 170 K/MCL (ref 140–450)
POTASSIUM SERPL-SCNC: 4.3 MMOL/L (ref 3.4–5.1)
PROTHROMBIN TIME: 13 SEC (ref 9.7–11.8)
RBC # BLD: 4.09 MIL/MCL (ref 4–5.2)
SODIUM SERPL-SCNC: 138 MMOL/L (ref 135–145)
WBC # BLD: 5 K/MCL (ref 4.2–11)

## 2024-11-19 PROCEDURE — 99153 MOD SED SAME PHYS/QHP EA: CPT | Performed by: INTERNAL MEDICINE

## 2024-11-19 PROCEDURE — 10006023 HB SUPPLY 272: Performed by: INTERNAL MEDICINE

## 2024-11-19 PROCEDURE — 13000001 HB PHASE II RECOVERY EA 30 MINUTES: Performed by: INTERNAL MEDICINE

## 2024-11-19 PROCEDURE — 99152 MOD SED SAME PHYS/QHP 5/>YRS: CPT | Performed by: INTERNAL MEDICINE

## 2024-11-19 PROCEDURE — 85610 PROTHROMBIN TIME: CPT | Performed by: INTERNAL MEDICINE

## 2024-11-19 PROCEDURE — C1894 INTRO/SHEATH, NON-LASER: HCPCS | Performed by: INTERNAL MEDICINE

## 2024-11-19 PROCEDURE — C1769 GUIDE WIRE: HCPCS | Performed by: INTERNAL MEDICINE

## 2024-11-19 PROCEDURE — 10002801 HB RX 250 W/O HCPCS: Performed by: INTERNAL MEDICINE

## 2024-11-19 PROCEDURE — 36415 COLL VENOUS BLD VENIPUNCTURE: CPT | Performed by: INTERNAL MEDICINE

## 2024-11-19 PROCEDURE — C1887 CATHETER, GUIDING: HCPCS | Performed by: INTERNAL MEDICINE

## 2024-11-19 PROCEDURE — 85027 COMPLETE CBC AUTOMATED: CPT | Performed by: INTERNAL MEDICINE

## 2024-11-19 PROCEDURE — 10002800 HB RX 250 W HCPCS: Performed by: INTERNAL MEDICINE

## 2024-11-19 PROCEDURE — 10002805 HB CONTRAST AGENT: Performed by: INTERNAL MEDICINE

## 2024-11-19 PROCEDURE — 93460 R&L HRT ART/VENTRICLE ANGIO: CPT | Performed by: INTERNAL MEDICINE

## 2024-11-19 PROCEDURE — 80048 BASIC METABOLIC PNL TOTAL CA: CPT | Performed by: INTERNAL MEDICINE

## 2024-11-19 PROCEDURE — 83735 ASSAY OF MAGNESIUM: CPT | Performed by: INTERNAL MEDICINE

## 2024-11-19 RX ORDER — SODIUM CHLORIDE 9 MG/ML
INJECTION, SOLUTION INTRAVENOUS CONTINUOUS
Status: DISCONTINUED | OUTPATIENT
Start: 2024-11-19 | End: 2024-11-19 | Stop reason: HOSPADM

## 2024-11-19 RX ORDER — 0.9 % SODIUM CHLORIDE 0.9 %
2 VIAL (ML) INJECTION EVERY 12 HOURS SCHEDULED
Status: DISCONTINUED | OUTPATIENT
Start: 2024-11-19 | End: 2024-11-19 | Stop reason: HOSPADM

## 2024-11-19 RX ORDER — LIDOCAINE HYDROCHLORIDE 20 MG/ML
INJECTION, SOLUTION EPIDURAL; INFILTRATION; INTRACAUDAL; PERINEURAL PRN
Status: DISCONTINUED | OUTPATIENT
Start: 2024-11-19 | End: 2024-11-19 | Stop reason: HOSPADM

## 2024-11-19 RX ORDER — ASPIRIN 325 MG
325 TABLET ORAL ONCE
Status: DISCONTINUED | OUTPATIENT
Start: 2024-11-19 | End: 2024-11-19 | Stop reason: HOSPADM

## 2024-11-19 RX ORDER — NITROGLYCERIN 0.4 MG/1
0.4 TABLET SUBLINGUAL EVERY 5 MIN PRN
Status: DISCONTINUED | OUTPATIENT
Start: 2024-11-19 | End: 2024-11-19 | Stop reason: HOSPADM

## 2024-11-19 RX ORDER — SODIUM CHLORIDE 9 MG/ML
INJECTION, SOLUTION INTRAVENOUS
Status: DISCONTINUED
Start: 2024-11-19 | End: 2024-11-19 | Stop reason: HOSPADM

## 2024-11-19 RX ORDER — ACETAMINOPHEN 650 MG/1
650 SUPPOSITORY RECTAL EVERY 4 HOURS PRN
Status: DISCONTINUED | OUTPATIENT
Start: 2024-11-19 | End: 2024-11-19 | Stop reason: HOSPADM

## 2024-11-19 RX ORDER — ACETAMINOPHEN 325 MG/1
650 TABLET ORAL EVERY 4 HOURS PRN
Status: DISCONTINUED | OUTPATIENT
Start: 2024-11-19 | End: 2024-11-19 | Stop reason: HOSPADM

## 2024-11-19 RX ORDER — CLONIDINE HYDROCHLORIDE 0.1 MG/1
0.1 TABLET ORAL EVERY 4 HOURS PRN
Status: DISCONTINUED | OUTPATIENT
Start: 2024-11-19 | End: 2024-11-19 | Stop reason: HOSPADM

## 2024-11-19 RX ORDER — 0.9 % SODIUM CHLORIDE 0.9 %
10 VIAL (ML) INJECTION PRN
Status: DISCONTINUED | OUTPATIENT
Start: 2024-11-19 | End: 2024-11-19 | Stop reason: HOSPADM

## 2024-11-19 RX ORDER — VERAPAMIL HYDROCHLORIDE 2.5 MG/ML
INJECTION, SOLUTION INTRAVENOUS PRN
Status: DISCONTINUED | OUTPATIENT
Start: 2024-11-19 | End: 2024-11-19 | Stop reason: HOSPADM

## 2024-11-19 RX ORDER — MIDAZOLAM HYDROCHLORIDE 1 MG/ML
INJECTION, SOLUTION INTRAMUSCULAR; INTRAVENOUS PRN
Status: DISCONTINUED | OUTPATIENT
Start: 2024-11-19 | End: 2024-11-19 | Stop reason: HOSPADM

## 2024-11-19 RX ORDER — HYDRALAZINE HYDROCHLORIDE 20 MG/ML
10 INJECTION INTRAMUSCULAR; INTRAVENOUS EVERY 4 HOURS PRN
Status: DISCONTINUED | OUTPATIENT
Start: 2024-11-19 | End: 2024-11-19 | Stop reason: HOSPADM

## 2024-11-19 RX ORDER — ASPIRIN 81 MG/1
81 TABLET, CHEWABLE ORAL DAILY
Status: DISCONTINUED | OUTPATIENT
Start: 2024-11-20 | End: 2024-11-19 | Stop reason: HOSPADM

## 2024-11-19 RX ORDER — ASPIRIN 81 MG/1
81 TABLET, CHEWABLE ORAL DAILY
Qty: 30 TABLET | Refills: 0 | Status: SHIPPED | OUTPATIENT
Start: 2024-11-20

## 2024-11-19 RX ORDER — HEPARIN SODIUM 1000 [USP'U]/ML
INJECTION, SOLUTION INTRAVENOUS; SUBCUTANEOUS PRN
Status: DISCONTINUED | OUTPATIENT
Start: 2024-11-19 | End: 2024-11-19 | Stop reason: HOSPADM

## 2024-11-19 ASSESSMENT — PAIN SCALES - GENERAL
PAINLEVEL_OUTOF10: 0

## 2024-11-20 ENCOUNTER — TELEPHONE (OUTPATIENT)
Dept: CARDIOLOGY | Age: 74
End: 2024-11-20

## 2024-11-20 RX ORDER — MAVACAMTEN 2.5 MG/1
2.5 CAPSULE, GELATIN COATED ORAL DAILY
Qty: 30 CAPSULE | Refills: 11 | Status: SHIPPED | OUTPATIENT
Start: 2024-11-20

## 2024-12-19 ENCOUNTER — TELEPHONE (OUTPATIENT)
Dept: CARDIOLOGY | Age: 74
End: 2024-12-19

## 2025-01-08 ENCOUNTER — APPOINTMENT (OUTPATIENT)
Dept: CARDIOLOGY | Age: 75
End: 2025-01-08
Attending: INTERNAL MEDICINE

## 2025-01-08 DIAGNOSIS — I42.2 HYPERTROPHIC CARDIOMYOPATHY  (CMD): ICD-10-CM

## 2025-01-08 LAB
ASCENDING AORTA (AAD): 3
AV STENOSIS SEVERITY TEXT: NORMAL
AVI LVOT PEAK GRADIENT (LVOTMG): 1.3
LEFT INTERNAL DIMENSION IN SYSTOLE (LVSD): 1.6
LEFT VENTRICULAR INTERNAL DIMENSION IN DIASTOLE (LVDD): 3.1
LEFT VENTRICULAR POSTERIOR WALL IN END DIASTOLE (LVPW): 4.5
LV EF: NORMAL %
RV END SYSTOLIC LONGITUDINAL STRAIN FREE WALL (RVGS): 1.8
TRICUSPID VALVE ANNULAR PEAK VELOCITY (TVAPV): 51
TRICUSPID VALVE PEAK REGURGITATION VELOCITY (TRPV): 3.4

## 2025-01-08 PROCEDURE — 93321 DOPPLER ECHO F-UP/LMTD STD: CPT | Performed by: INTERNAL MEDICINE

## 2025-01-08 PROCEDURE — 93325 DOPPLER ECHO COLOR FLOW MAPG: CPT | Performed by: INTERNAL MEDICINE

## 2025-01-08 PROCEDURE — 93308 TTE F-UP OR LMTD: CPT | Performed by: INTERNAL MEDICINE

## 2025-01-10 ENCOUNTER — TELEPHONE (OUTPATIENT)
Dept: CARDIOLOGY | Age: 75
End: 2025-01-10

## 2025-01-10 DIAGNOSIS — I42.2 HYPERTROPHIC CARDIOMYOPATHY  (CMD): ICD-10-CM

## 2025-01-10 DIAGNOSIS — I48.21 PERMANENT ATRIAL FIBRILLATION  (CMD): ICD-10-CM

## 2025-01-10 DIAGNOSIS — I50.32 CHRONIC HEART FAILURE WITH PRESERVED EJECTION FRACTION  (CMD): Primary | ICD-10-CM

## 2025-01-10 RX ORDER — MAVACAMTEN 2.5 MG/1
2.5 CAPSULE, GELATIN COATED ORAL DAILY
Qty: 30 CAPSULE | Refills: 11 | Status: SHIPPED | OUTPATIENT
Start: 2025-01-10

## 2025-01-21 ENCOUNTER — APPOINTMENT (OUTPATIENT)
Dept: CARDIOLOGY | Age: 75
End: 2025-01-21

## 2025-01-21 VITALS
BODY MASS INDEX: 24.07 KG/M2 | WEIGHT: 140.21 LBS | SYSTOLIC BLOOD PRESSURE: 176 MMHG | HEART RATE: 57 BPM | DIASTOLIC BLOOD PRESSURE: 61 MMHG

## 2025-01-21 DIAGNOSIS — I48.0 PAF (PAROXYSMAL ATRIAL FIBRILLATION)  (CMD): ICD-10-CM

## 2025-01-21 DIAGNOSIS — I42.2 HYPERTROPHIC CARDIOMYOPATHY  (CMD): Primary | ICD-10-CM

## 2025-01-21 PROCEDURE — 99215 OFFICE O/P EST HI 40 MIN: CPT | Performed by: INTERNAL MEDICINE

## 2025-01-21 ASSESSMENT — ENCOUNTER SYMPTOMS
LIGHT-HEADEDNESS: 0
HEADACHES: 0
INSOMNIA: 0
BLOATING: 0
DIZZINESS: 0
ANOREXIA: 0
SHORTNESS OF BREATH: 0
LOSS OF BALANCE: 1
ABDOMINAL PAIN: 0
WEIGHT GAIN: 1
DEPRESSION: 0

## 2025-01-22 ENCOUNTER — MED REC SCAN ONLY (OUTPATIENT)
Dept: INTERNAL MEDICINE CLINIC | Facility: CLINIC | Age: 75
End: 2025-01-22

## 2025-02-18 ENCOUNTER — TELEPHONE (OUTPATIENT)
Dept: CARDIOLOGY | Age: 75
End: 2025-02-18

## 2025-03-10 ENCOUNTER — APPOINTMENT (OUTPATIENT)
Dept: CARDIOLOGY | Age: 75
End: 2025-03-10

## 2025-03-10 VITALS
OXYGEN SATURATION: 93 % | WEIGHT: 146.06 LBS | HEART RATE: 79 BPM | BODY MASS INDEX: 25.07 KG/M2 | DIASTOLIC BLOOD PRESSURE: 78 MMHG | SYSTOLIC BLOOD PRESSURE: 168 MMHG

## 2025-03-10 DIAGNOSIS — Z95.2 S/P MVR (MITRAL VALVE REPLACEMENT): Primary | ICD-10-CM

## 2025-03-10 DIAGNOSIS — I50.32 CHRONIC HEART FAILURE WITH PRESERVED EJECTION FRACTION  (CMD): ICD-10-CM

## 2025-03-10 PROCEDURE — 99215 OFFICE O/P EST HI 40 MIN: CPT | Performed by: INTERNAL MEDICINE

## 2025-03-10 RX ORDER — TORSEMIDE 20 MG/1
20 TABLET ORAL DAILY
Qty: 90 TABLET | Refills: 3 | Status: SHIPPED | OUTPATIENT
Start: 2025-03-10

## 2025-03-10 SDOH — HEALTH STABILITY: PHYSICAL HEALTH: ON AVERAGE, HOW MANY DAYS PER WEEK DO YOU ENGAGE IN MODERATE TO STRENUOUS EXERCISE (LIKE A BRISK WALK)?: 0 DAYS

## 2025-03-10 SDOH — HEALTH STABILITY: PHYSICAL HEALTH: ON AVERAGE, HOW MANY MINUTES DO YOU ENGAGE IN EXERCISE AT THIS LEVEL?: 0 MIN

## 2025-03-10 ASSESSMENT — ENCOUNTER SYMPTOMS
SHORTNESS OF BREATH: 0
DEPRESSION: 0
ANOREXIA: 0
WEIGHT GAIN: 1
HEADACHES: 0
LOSS OF BALANCE: 1
LIGHT-HEADEDNESS: 0
INSOMNIA: 0
BLOATING: 0
DIZZINESS: 0
ABDOMINAL PAIN: 0

## 2025-03-10 ASSESSMENT — PATIENT HEALTH QUESTIONNAIRE - PHQ9
SUM OF ALL RESPONSES TO PHQ9 QUESTIONS 1 AND 2: 0
SUM OF ALL RESPONSES TO PHQ9 QUESTIONS 1 AND 2: 0
2. FEELING DOWN, DEPRESSED OR HOPELESS: NOT AT ALL
1. LITTLE INTEREST OR PLEASURE IN DOING THINGS: NOT AT ALL
CLINICAL INTERPRETATION OF PHQ2 SCORE: NO FURTHER SCREENING NEEDED

## 2025-03-12 ENCOUNTER — MED REC SCAN ONLY (OUTPATIENT)
Dept: INTERNAL MEDICINE CLINIC | Facility: CLINIC | Age: 75
End: 2025-03-12

## 2025-03-17 ENCOUNTER — TELEPHONE (OUTPATIENT)
Dept: CARDIOLOGY | Age: 75
End: 2025-03-17

## 2025-03-26 ENCOUNTER — MED REC SCAN ONLY (OUTPATIENT)
Dept: INTERNAL MEDICINE CLINIC | Facility: CLINIC | Age: 75
End: 2025-03-26

## 2025-04-08 ENCOUNTER — APPOINTMENT (OUTPATIENT)
Dept: CARDIOLOGY | Age: 75
End: 2025-04-08
Attending: INTERNAL MEDICINE

## 2025-04-08 ENCOUNTER — LAB SERVICES (OUTPATIENT)
Dept: LAB | Age: 75
End: 2025-04-08

## 2025-04-08 DIAGNOSIS — Z95.2 S/P MVR (MITRAL VALVE REPLACEMENT): ICD-10-CM

## 2025-04-08 DIAGNOSIS — I48.21 PERMANENT ATRIAL FIBRILLATION  (CMD): ICD-10-CM

## 2025-04-08 DIAGNOSIS — I42.2 HYPERTROPHIC CARDIOMYOPATHY  (CMD): ICD-10-CM

## 2025-04-08 DIAGNOSIS — I50.32 CHRONIC HEART FAILURE WITH PRESERVED EJECTION FRACTION  (CMD): ICD-10-CM

## 2025-04-08 LAB
ANION GAP SERPL CALC-SCNC: 14 MMOL/L (ref 7–19)
AORTIC VALVE AREA (AVA): 1.73
ASCENDING AORTA (AAD): 3
AV PEAK GRADIENT (AVPG): 16
AV PEAK VELOCITY (AVPV): 2.02
AV STENOSIS SEVERITY TEXT: NORMAL
AVI LVOT PEAK GRADIENT (LVOTMG): 1.3
BUN SERPL-MCNC: 53 MG/DL (ref 6–20)
BUN/CREAT SERPL: 24 (ref 7–25)
CALCIUM SERPL-MCNC: 10.2 MG/DL (ref 8.4–10.2)
CHLORIDE SERPL-SCNC: 101 MMOL/L (ref 97–110)
CO2 SERPL-SCNC: 26 MMOL/L (ref 21–32)
CREAT SERPL-MCNC: 2.2 MG/DL (ref 0.51–0.95)
EGFRCR SERPLBLD CKD-EPI 2021: 23 ML/MIN/{1.73_M2}
FASTING DURATION TIME PATIENT: ABNORMAL H
GLUCOSE SERPL-MCNC: 88 MG/DL (ref 70–99)
LEFT INTERNAL DIMENSION IN SYSTOLE (LVSD): 1.3
LEFT VENTRICULAR INTERNAL DIMENSION IN DIASTOLE (LVDD): 2.3
LEFT VENTRICULAR POSTERIOR WALL IN END DIASTOLE (LVPW): 3.3
LV EF: NORMAL %
MV PEAK A VELOCITY (MVPAV): 137
MV PEAK E VELOCITY (MVPEV): 1
NT-PROBNP SERPL-MCNC: 2439 PG/ML
POTASSIUM SERPL-SCNC: 3.7 MMOL/L (ref 3.4–5.1)
RV END SYSTOLIC LONGITUDINAL STRAIN FREE WALL (RVGS): 2
SODIUM SERPL-SCNC: 137 MMOL/L (ref 135–145)
TRICUSPID VALVE ANNULAR PEAK VELOCITY (TVAPV): 42

## 2025-04-08 PROCEDURE — 80048 BASIC METABOLIC PNL TOTAL CA: CPT | Performed by: CLINICAL MEDICAL LABORATORY

## 2025-04-08 PROCEDURE — 83880 ASSAY OF NATRIURETIC PEPTIDE: CPT | Performed by: CLINICAL MEDICAL LABORATORY

## 2025-04-08 PROCEDURE — 36415 COLL VENOUS BLD VENIPUNCTURE: CPT | Performed by: CLINICAL MEDICAL LABORATORY

## 2025-04-08 PROCEDURE — 93308 TTE F-UP OR LMTD: CPT | Performed by: INTERNAL MEDICINE

## 2025-04-08 PROCEDURE — 93325 DOPPLER ECHO COLOR FLOW MAPG: CPT | Performed by: INTERNAL MEDICINE

## 2025-04-08 PROCEDURE — 93321 DOPPLER ECHO F-UP/LMTD STD: CPT | Performed by: INTERNAL MEDICINE

## 2025-04-09 ENCOUNTER — E-ADVICE (OUTPATIENT)
Dept: CARDIOLOGY | Age: 75
End: 2025-04-09

## 2025-04-09 DIAGNOSIS — Z95.2 S/P MVR (MITRAL VALVE REPLACEMENT): ICD-10-CM

## 2025-04-09 DIAGNOSIS — I42.2 HYPERTROPHIC CARDIOMYOPATHY  (CMD): Primary | ICD-10-CM

## 2025-04-09 DIAGNOSIS — N28.9 ACUTE RENAL INSUFFICIENCY: ICD-10-CM

## 2025-04-09 DIAGNOSIS — N28.9 RENAL INSUFFICIENCY: ICD-10-CM

## 2025-04-09 DIAGNOSIS — N17.9 AKI (ACUTE KIDNEY INJURY) (CMD): ICD-10-CM

## 2025-04-09 DIAGNOSIS — I50.32 CHRONIC HEART FAILURE WITH PRESERVED EJECTION FRACTION  (CMD): ICD-10-CM

## 2025-04-09 DIAGNOSIS — I10 PRIMARY HYPERTENSION: ICD-10-CM

## 2025-04-09 DIAGNOSIS — I48.0 PAROXYSMAL ATRIAL FIBRILLATION  (CMD): ICD-10-CM

## 2025-04-11 RX ORDER — TORSEMIDE 20 MG/1
10 TABLET ORAL DAILY
Qty: 45 TABLET | Refills: 3 | Status: SHIPPED | OUTPATIENT
Start: 2025-04-11

## 2025-04-14 ENCOUNTER — E-ADVICE (OUTPATIENT)
Dept: CARDIOLOGY | Age: 75
End: 2025-04-14

## 2025-04-21 ENCOUNTER — APPOINTMENT (OUTPATIENT)
Dept: CARDIOLOGY | Age: 75
End: 2025-04-21
Attending: INTERNAL MEDICINE

## 2025-04-21 ENCOUNTER — E-ADVICE (OUTPATIENT)
Dept: CARDIOLOGY | Age: 75
End: 2025-04-21

## 2025-04-21 DIAGNOSIS — I50.32 CHRONIC HEART FAILURE WITH PRESERVED EJECTION FRACTION  (CMD): ICD-10-CM

## 2025-04-21 DIAGNOSIS — N17.9 AKI (ACUTE KIDNEY INJURY) (CMD): ICD-10-CM

## 2025-04-21 DIAGNOSIS — N28.9 RENAL INSUFFICIENCY: ICD-10-CM

## 2025-04-21 DIAGNOSIS — I48.0 PAROXYSMAL ATRIAL FIBRILLATION  (CMD): ICD-10-CM

## 2025-04-21 DIAGNOSIS — N28.9 ACUTE RENAL INSUFFICIENCY: ICD-10-CM

## 2025-04-21 DIAGNOSIS — I42.2 HYPERTROPHIC CARDIOMYOPATHY  (CMD): ICD-10-CM

## 2025-04-21 DIAGNOSIS — I10 PRIMARY HYPERTENSION: ICD-10-CM

## 2025-04-21 PROCEDURE — 93975 VASCULAR STUDY: CPT | Performed by: INTERNAL MEDICINE

## 2025-04-22 ENCOUNTER — APPOINTMENT (OUTPATIENT)
Dept: CARDIOLOGY | Age: 75
End: 2025-04-22

## 2025-04-22 VITALS
SYSTOLIC BLOOD PRESSURE: 132 MMHG | BODY MASS INDEX: 23.9 KG/M2 | WEIGHT: 139.22 LBS | DIASTOLIC BLOOD PRESSURE: 79 MMHG | HEART RATE: 79 BPM | OXYGEN SATURATION: 99 %

## 2025-04-22 DIAGNOSIS — I42.2 HYPERTROPHIC CARDIOMYOPATHY  (CMD): ICD-10-CM

## 2025-04-22 DIAGNOSIS — E78.5 DYSLIPIDEMIA: ICD-10-CM

## 2025-04-22 DIAGNOSIS — E55.9 VITAMIN D DEFICIENCY: ICD-10-CM

## 2025-04-22 DIAGNOSIS — I50.32 CHRONIC HEART FAILURE WITH PRESERVED EJECTION FRACTION  (CMD): Primary | ICD-10-CM

## 2025-04-22 PROCEDURE — 99215 OFFICE O/P EST HI 40 MIN: CPT | Performed by: INTERNAL MEDICINE

## 2025-04-22 RX ORDER — ENALAPRIL MALEATE 10 MG/1
10 TABLET ORAL 2 TIMES DAILY
Qty: 180 TABLET | Refills: 3 | Status: SHIPPED | OUTPATIENT
Start: 2025-04-22

## 2025-04-22 SDOH — HEALTH STABILITY: PHYSICAL HEALTH: ON AVERAGE, HOW MANY MINUTES DO YOU ENGAGE IN EXERCISE AT THIS LEVEL?: 0 MIN

## 2025-04-22 SDOH — HEALTH STABILITY: PHYSICAL HEALTH: ON AVERAGE, HOW MANY DAYS PER WEEK DO YOU ENGAGE IN MODERATE TO STRENUOUS EXERCISE (LIKE A BRISK WALK)?: 0 DAYS

## 2025-04-22 ASSESSMENT — ENCOUNTER SYMPTOMS
DEPRESSION: 0
LIGHT-HEADEDNESS: 0
HEADACHES: 0
DIZZINESS: 0
LOSS OF BALANCE: 1
SHORTNESS OF BREATH: 0
ANOREXIA: 0
INSOMNIA: 0
ABDOMINAL PAIN: 0
BLOATING: 0

## 2025-04-22 ASSESSMENT — PATIENT HEALTH QUESTIONNAIRE - PHQ9
CLINICAL INTERPRETATION OF PHQ2 SCORE: NO FURTHER SCREENING NEEDED
SUM OF ALL RESPONSES TO PHQ9 QUESTIONS 1 AND 2: 0
SUM OF ALL RESPONSES TO PHQ9 QUESTIONS 1 AND 2: 0
1. LITTLE INTEREST OR PLEASURE IN DOING THINGS: NOT AT ALL
2. FEELING DOWN, DEPRESSED OR HOPELESS: NOT AT ALL

## 2025-04-25 ENCOUNTER — LAB SERVICES (OUTPATIENT)
Dept: LAB | Age: 75
End: 2025-04-25

## 2025-04-25 DIAGNOSIS — E78.5 DYSLIPIDEMIA: ICD-10-CM

## 2025-04-25 DIAGNOSIS — I50.32 CHRONIC HEART FAILURE WITH PRESERVED EJECTION FRACTION  (CMD): ICD-10-CM

## 2025-04-25 DIAGNOSIS — I42.2 HYPERTROPHIC CARDIOMYOPATHY  (CMD): ICD-10-CM

## 2025-04-25 DIAGNOSIS — I48.0 PAROXYSMAL ATRIAL FIBRILLATION  (CMD): ICD-10-CM

## 2025-04-25 DIAGNOSIS — E55.9 VITAMIN D DEFICIENCY: ICD-10-CM

## 2025-04-25 DIAGNOSIS — I10 PRIMARY HYPERTENSION: ICD-10-CM

## 2025-04-25 LAB
ALBUMIN SERPL-MCNC: 3.4 G/DL (ref 3.4–5)
ALBUMIN/GLOB SERPL: 0.8 {RATIO} (ref 1–2.4)
ALP SERPL-CCNC: 104 UNITS/L (ref 45–117)
ALT SERPL-CCNC: 42 UNITS/L
ANION GAP SERPL CALC-SCNC: 7 MMOL/L (ref 7–19)
AST SERPL-CCNC: 27 UNITS/L
BASOPHILS # BLD: 0 K/MCL (ref 0–0.3)
BASOPHILS NFR BLD: 1 %
BILIRUB SERPL-MCNC: 0.4 MG/DL (ref 0.2–1)
BUN SERPL-MCNC: 39 MG/DL (ref 6–20)
BUN/CREAT SERPL: 24 (ref 7–25)
CALCIUM SERPL-MCNC: 8.8 MG/DL (ref 8.4–10.2)
CHLORIDE SERPL-SCNC: 111 MMOL/L (ref 97–110)
CHOLEST SERPL-MCNC: 140 MG/DL
CHOLEST/HDLC SERPL: 2.3 {RATIO}
CO2 SERPL-SCNC: 27 MMOL/L (ref 21–32)
CREAT SERPL-MCNC: 1.61 MG/DL (ref 0.51–0.95)
DEPRECATED RDW RBC: 45.6 FL (ref 39–50)
EGFRCR SERPLBLD CKD-EPI 2021: 33 ML/MIN/{1.73_M2}
EOSINOPHIL # BLD: 0.3 K/MCL (ref 0–0.5)
EOSINOPHIL NFR BLD: 8 %
ERYTHROCYTE [DISTWIDTH] IN BLOOD: 14.2 % (ref 11–15)
FASTING DURATION TIME PATIENT: ABNORMAL H
GLOBULIN SER-MCNC: 4.3 G/DL (ref 2–4)
GLUCOSE SERPL-MCNC: 93 MG/DL (ref 70–99)
HCT VFR BLD CALC: 32.6 % (ref 36–46.5)
HDLC SERPL-MCNC: 60 MG/DL
HGB BLD-MCNC: 10.3 G/DL (ref 12–15.5)
IMM GRANULOCYTES # BLD AUTO: 0 K/MCL (ref 0–0.2)
IMM GRANULOCYTES # BLD: 1 %
LDLC SERPL CALC-MCNC: 53 MG/DL
LYMPHOCYTES # BLD: 0.6 K/MCL (ref 1–4)
LYMPHOCYTES NFR BLD: 14 %
MCH RBC QN AUTO: 27.9 PG (ref 26–34)
MCHC RBC AUTO-ENTMCNC: 31.6 G/DL (ref 32–36.5)
MCV RBC AUTO: 88.3 FL (ref 78–100)
MONOCYTES # BLD: 0.4 K/MCL (ref 0.3–0.9)
MONOCYTES NFR BLD: 10 %
NEUTROPHILS # BLD: 2.9 K/MCL (ref 1.8–7.7)
NEUTROPHILS NFR BLD: 66 %
NONHDLC SERPL-MCNC: 80 MG/DL
NRBC BLD MANUAL-RTO: 0 /100 WBC
NT-PROBNP SERPL-MCNC: 2966 PG/ML
PLATELET # BLD AUTO: 175 K/MCL (ref 140–450)
POTASSIUM SERPL-SCNC: 4.1 MMOL/L (ref 3.4–5.1)
PROT SERPL-MCNC: 7.7 G/DL (ref 6.4–8.2)
RBC # BLD: 3.69 MIL/MCL (ref 4–5.2)
SODIUM SERPL-SCNC: 141 MMOL/L (ref 135–145)
TRIGL SERPL-MCNC: 133 MG/DL
TSH SERPL-ACNC: 2.64 MCUNITS/ML (ref 0.35–5)
WBC # BLD: 4.3 K/MCL (ref 4.2–11)

## 2025-04-25 PROCEDURE — 84443 ASSAY THYROID STIM HORMONE: CPT | Performed by: CLINICAL MEDICAL LABORATORY

## 2025-04-25 PROCEDURE — 80061 LIPID PANEL: CPT | Performed by: CLINICAL MEDICAL LABORATORY

## 2025-04-25 PROCEDURE — 85025 COMPLETE CBC W/AUTO DIFF WBC: CPT | Performed by: CLINICAL MEDICAL LABORATORY

## 2025-04-25 PROCEDURE — 82306 VITAMIN D 25 HYDROXY: CPT | Performed by: CLINICAL MEDICAL LABORATORY

## 2025-04-25 PROCEDURE — 36415 COLL VENOUS BLD VENIPUNCTURE: CPT | Performed by: CLINICAL MEDICAL LABORATORY

## 2025-04-25 PROCEDURE — 83880 ASSAY OF NATRIURETIC PEPTIDE: CPT | Performed by: CLINICAL MEDICAL LABORATORY

## 2025-04-25 PROCEDURE — 80053 COMPREHEN METABOLIC PANEL: CPT | Performed by: CLINICAL MEDICAL LABORATORY

## 2025-04-26 LAB — 25(OH)D3+25(OH)D2 SERPL-MCNC: 40 NG/ML (ref 30–100)

## 2025-04-29 ENCOUNTER — RESULTS FOLLOW-UP (OUTPATIENT)
Dept: CARDIOLOGY | Age: 75
End: 2025-04-29

## 2025-05-12 ENCOUNTER — TELEPHONE (OUTPATIENT)
Dept: INTERNAL MEDICINE CLINIC | Facility: CLINIC | Age: 75
End: 2025-05-12

## 2025-06-13 ENCOUNTER — APPOINTMENT (OUTPATIENT)
Dept: CARDIOLOGY | Age: 75
End: 2025-06-13

## 2025-07-29 ENCOUNTER — OFFICE VISIT (OUTPATIENT)
Dept: INTERNAL MEDICINE CLINIC | Facility: CLINIC | Age: 75
End: 2025-07-29
Payer: MEDICARE

## 2025-07-29 ENCOUNTER — LAB ENCOUNTER (OUTPATIENT)
Dept: LAB | Age: 75
End: 2025-07-29
Attending: INTERNAL MEDICINE

## 2025-07-29 ENCOUNTER — RESULTS FOLLOW-UP (OUTPATIENT)
Dept: INTERNAL MEDICINE CLINIC | Facility: CLINIC | Age: 75
End: 2025-07-29

## 2025-07-29 DIAGNOSIS — I48.20 ATRIAL FIBRILLATION, CHRONIC (HCC): ICD-10-CM

## 2025-07-29 DIAGNOSIS — I34.0 NONRHEUMATIC MITRAL VALVE REGURGITATION: ICD-10-CM

## 2025-07-29 DIAGNOSIS — Z79.01 ON WARFARIN THERAPY: ICD-10-CM

## 2025-07-29 DIAGNOSIS — Z78.0 POSTMENOPAUSAL: Primary | ICD-10-CM

## 2025-07-29 DIAGNOSIS — M81.0 AGE-RELATED OSTEOPOROSIS WITHOUT CURRENT PATHOLOGICAL FRACTURE: ICD-10-CM

## 2025-07-29 DIAGNOSIS — I10 HYPERTENSION, UNSPECIFIED TYPE: ICD-10-CM

## 2025-07-29 DIAGNOSIS — E78.5 HYPERLIPIDEMIA, UNSPECIFIED HYPERLIPIDEMIA TYPE: ICD-10-CM

## 2025-07-29 DIAGNOSIS — E78.5 DYSLIPIDEMIA: ICD-10-CM

## 2025-07-29 DIAGNOSIS — I27.20 PULMONARY HTN (HCC): ICD-10-CM

## 2025-07-29 DIAGNOSIS — I42.2 HYPERTROPHIC CARDIOMYOPATHY (HCC): ICD-10-CM

## 2025-07-29 DIAGNOSIS — Z12.31 VISIT FOR SCREENING MAMMOGRAM: ICD-10-CM

## 2025-07-29 DIAGNOSIS — I10 HYPERTENSION, BENIGN: ICD-10-CM

## 2025-07-29 DIAGNOSIS — N18.32 STAGE 3B CHRONIC KIDNEY DISEASE (HCC): ICD-10-CM

## 2025-07-29 DIAGNOSIS — R79.89 LOW VITAMIN D LEVEL: ICD-10-CM

## 2025-07-29 DIAGNOSIS — R73.09 HIGH GLUCOSE LEVEL: ICD-10-CM

## 2025-07-29 DIAGNOSIS — Z00.00 MEDICARE ANNUAL WELLNESS VISIT, SUBSEQUENT: Primary | ICD-10-CM

## 2025-07-29 DIAGNOSIS — Z78.0 POSTMENOPAUSAL: ICD-10-CM

## 2025-07-29 LAB
ALBUMIN SERPL-MCNC: 4.7 G/DL (ref 3.2–4.8)
ALBUMIN/GLOB SERPL: 1.5 (ref 1–2)
ALP LIVER SERPL-CCNC: 91 U/L (ref 55–142)
ALT SERPL-CCNC: 16 U/L (ref 10–49)
ANION GAP SERPL CALC-SCNC: 11 MMOL/L (ref 0–18)
AST SERPL-CCNC: 24 U/L (ref ?–34)
BASOPHILS # BLD AUTO: 0.03 X10(3) UL (ref 0–0.2)
BASOPHILS NFR BLD AUTO: 0.7 %
BILIRUB SERPL-MCNC: 0.7 MG/DL (ref 0.2–1.1)
BUN BLD-MCNC: 27 MG/DL (ref 9–23)
BUN/CREAT SERPL: 15.7 (ref 10–20)
CALCIUM BLD-MCNC: 9.3 MG/DL (ref 8.7–10.4)
CHLORIDE SERPL-SCNC: 101 MMOL/L (ref 98–112)
CO2 SERPL-SCNC: 26 MMOL/L (ref 21–32)
CREAT BLD-MCNC: 1.72 MG/DL (ref 0.55–1.02)
DEPRECATED RDW RBC AUTO: 43.7 FL (ref 35.1–46.3)
EGFRCR SERPLBLD CKD-EPI 2021: 31 ML/MIN/1.73M2 (ref 60–?)
EOSINOPHIL # BLD AUTO: 0.21 X10(3) UL (ref 0–0.7)
EOSINOPHIL NFR BLD AUTO: 4.7 %
ERYTHROCYTE [DISTWIDTH] IN BLOOD BY AUTOMATED COUNT: 13.3 % (ref 11–15)
FASTING STATUS PATIENT QL REPORTED: YES
GLOBULIN PLAS-MCNC: 3.1 G/DL (ref 2–3.5)
GLUCOSE BLD-MCNC: 87 MG/DL (ref 70–99)
HCT VFR BLD AUTO: 38.6 % (ref 35–48)
HGB BLD-MCNC: 12.1 G/DL (ref 12–16)
IMM GRANULOCYTES # BLD AUTO: 0.01 X10(3) UL (ref 0–1)
IMM GRANULOCYTES NFR BLD: 0.2 %
LYMPHOCYTES # BLD AUTO: 0.76 X10(3) UL (ref 1–4)
LYMPHOCYTES NFR BLD AUTO: 16.9 %
MCH RBC QN AUTO: 27.9 PG (ref 26–34)
MCHC RBC AUTO-ENTMCNC: 31.3 G/DL (ref 31–37)
MCV RBC AUTO: 89.1 FL (ref 80–100)
MONOCYTES # BLD AUTO: 0.41 X10(3) UL (ref 0.1–1)
MONOCYTES NFR BLD AUTO: 9.1 %
NEUTROPHILS # BLD AUTO: 3.09 X10 (3) UL (ref 1.5–7.7)
NEUTROPHILS # BLD AUTO: 3.09 X10(3) UL (ref 1.5–7.7)
NEUTROPHILS NFR BLD AUTO: 68.4 %
OSMOLALITY SERPL CALC.SUM OF ELEC: 290 MOSM/KG (ref 275–295)
PLATELET # BLD AUTO: 166 10(3)UL (ref 150–450)
POTASSIUM SERPL-SCNC: 4.2 MMOL/L (ref 3.5–5.1)
PROT SERPL-MCNC: 7.8 G/DL (ref 5.7–8.2)
RBC # BLD AUTO: 4.33 X10(6)UL (ref 3.8–5.3)
SODIUM SERPL-SCNC: 138 MMOL/L (ref 136–145)
T4 FREE SERPL-MCNC: 1.4 NG/DL (ref 0.8–1.7)
TSI SER-ACNC: 2.76 UIU/ML (ref 0.55–4.78)
VIT D+METAB SERPL-MCNC: 61.1 NG/ML (ref 30–100)
WBC # BLD AUTO: 4.5 X10(3) UL (ref 4–11)

## 2025-07-29 PROCEDURE — 84443 ASSAY THYROID STIM HORMONE: CPT

## 2025-07-29 PROCEDURE — 85025 COMPLETE CBC W/AUTO DIFF WBC: CPT

## 2025-07-29 PROCEDURE — 82306 VITAMIN D 25 HYDROXY: CPT

## 2025-07-29 PROCEDURE — 80053 COMPREHEN METABOLIC PANEL: CPT

## 2025-07-29 PROCEDURE — 36415 COLL VENOUS BLD VENIPUNCTURE: CPT

## 2025-07-29 PROCEDURE — 84439 ASSAY OF FREE THYROXINE: CPT

## 2025-07-29 RX ORDER — TORSEMIDE 20 MG/1
10 TABLET ORAL DAILY
COMMUNITY
Start: 2025-04-11

## 2025-07-30 VITALS
WEIGHT: 133 LBS | SYSTOLIC BLOOD PRESSURE: 138 MMHG | HEART RATE: 74 BPM | HEIGHT: 64 IN | TEMPERATURE: 98 F | DIASTOLIC BLOOD PRESSURE: 82 MMHG | BODY MASS INDEX: 22.71 KG/M2

## 2025-08-04 ENCOUNTER — LAB ENCOUNTER (OUTPATIENT)
Dept: LAB | Age: 75
End: 2025-08-04
Attending: INTERNAL MEDICINE

## 2025-08-04 DIAGNOSIS — Z78.0 POSTMENOPAUSAL: ICD-10-CM

## 2025-08-04 DIAGNOSIS — R73.09 HIGH GLUCOSE LEVEL: ICD-10-CM

## 2025-08-04 LAB
ANION GAP SERPL CALC-SCNC: 8 MMOL/L (ref 0–18)
BUN BLD-MCNC: 30 MG/DL (ref 9–23)
BUN/CREAT SERPL: 17.1 (ref 10–20)
CALCIUM BLD-MCNC: 9.3 MG/DL (ref 8.7–10.4)
CHLORIDE SERPL-SCNC: 101 MMOL/L (ref 98–112)
CO2 SERPL-SCNC: 29 MMOL/L (ref 21–32)
CREAT BLD-MCNC: 1.75 MG/DL (ref 0.55–1.02)
EGFRCR SERPLBLD CKD-EPI 2021: 30 ML/MIN/1.73M2 (ref 60–?)
FASTING STATUS PATIENT QL REPORTED: NO
GLUCOSE BLD-MCNC: 103 MG/DL (ref 70–99)
OSMOLALITY SERPL CALC.SUM OF ELEC: 292 MOSM/KG (ref 275–295)
POTASSIUM SERPL-SCNC: 4.1 MMOL/L (ref 3.5–5.1)
SODIUM SERPL-SCNC: 138 MMOL/L (ref 136–145)

## 2025-08-04 PROCEDURE — 36415 COLL VENOUS BLD VENIPUNCTURE: CPT

## 2025-08-04 PROCEDURE — 80048 BASIC METABOLIC PNL TOTAL CA: CPT

## 2025-08-07 ENCOUNTER — OFFICE VISIT (OUTPATIENT)
Facility: LOCATION | Age: 75
End: 2025-08-07

## 2025-08-07 VITALS
BODY MASS INDEX: 22.71 KG/M2 | DIASTOLIC BLOOD PRESSURE: 68 MMHG | HEIGHT: 64 IN | SYSTOLIC BLOOD PRESSURE: 132 MMHG | HEART RATE: 84 BPM | WEIGHT: 133 LBS

## 2025-08-07 DIAGNOSIS — I10 HYPERTENSION, BENIGN: ICD-10-CM

## 2025-08-07 DIAGNOSIS — Z79.01 ON WARFARIN THERAPY: ICD-10-CM

## 2025-08-07 DIAGNOSIS — I48.20 ATRIAL FIBRILLATION, CHRONIC (HCC): Primary | ICD-10-CM

## 2025-08-07 DIAGNOSIS — R79.89 LOW VITAMIN D LEVEL: ICD-10-CM

## 2025-08-07 DIAGNOSIS — M81.0 AGE-RELATED OSTEOPOROSIS WITHOUT CURRENT PATHOLOGICAL FRACTURE: ICD-10-CM

## 2025-08-07 DIAGNOSIS — N18.32 STAGE 3B CHRONIC KIDNEY DISEASE (HCC): ICD-10-CM

## 2025-08-07 DIAGNOSIS — E78.5 DYSLIPIDEMIA: ICD-10-CM

## 2025-08-07 DIAGNOSIS — I10 HYPERTENSION, UNSPECIFIED TYPE: ICD-10-CM

## 2025-08-07 PROCEDURE — G2211 COMPLEX E/M VISIT ADD ON: HCPCS | Performed by: INTERNAL MEDICINE

## 2025-08-07 PROCEDURE — 99214 OFFICE O/P EST MOD 30 MIN: CPT | Performed by: INTERNAL MEDICINE

## 2025-08-13 ENCOUNTER — HOSPITAL ENCOUNTER (OUTPATIENT)
Dept: ULTRASOUND IMAGING | Facility: HOSPITAL | Age: 75
Discharge: HOME | End: 2025-08-13
Attending: INTERNAL MEDICINE

## 2025-08-13 DIAGNOSIS — N18.32 STAGE 3B CHRONIC KIDNEY DISEASE (HCC): ICD-10-CM

## 2025-08-13 PROCEDURE — 76775 US EXAM ABDO BACK WALL LIM: CPT | Performed by: INTERNAL MEDICINE

## 2025-10-24 ENCOUNTER — APPOINTMENT (OUTPATIENT)
Dept: CARDIOLOGY | Age: 75
End: 2025-10-24

## (undated) DEVICE — Device

## (undated) DEVICE — GLOVE SURG 7.5 PROTEXIS LF CRM PF SMTH BEAD CUFF STRL

## (undated) DEVICE — ADAPTER TBG L1 IN 2 MALE LL LOCK DEHP FREE IV MEDEX .1 ML

## (undated) DEVICE — SYRINGE 3ML GRAD N-PYRG DEHP-FR PVC FREE STRL MED LF DISP LL

## (undated) DEVICE — CATH IMPULSE FL3.5 6F 100CM

## (undated) DEVICE — KIT MICROINTRODUCER 4FR 21GA 40CM 4CM .018IN SMTH NDL MNDRL

## (undated) DEVICE — SYRINGE 8ML ROTATE MALE LL ADPR RING GRIP MED INJECT8 CCS

## (undated) DEVICE — ELECTRODE DFBR UNV 15.2X10.8CM ADH PAD RDTRNS POUCH PADPRO

## (undated) DEVICE — BAG WST 48IN SPK FLTR RLLR CLAMP FEMALE LL TUBE VENT MERIT

## (undated) DEVICE — GUIDEWIRE STRT 10CM 260CM J CRV TPR .035IN VASC PTFE PERIPH

## (undated) DEVICE — DRESSING TRANS 4.75X4IN ADH HPOAL WTPRF TEGADERM PU STD STRL

## (undated) DEVICE — CANNULA NSL ADPR SET NOMOLINE

## (undated) DEVICE — SHEATH 5FR 10CM 2.5CM .038IN GUIDE SNAP ON DIL LOCK KINK RST

## (undated) DEVICE — CATHETER 6FR 145D PGTL CRV .038IN 110CM 8 SH OUTER LUM 5 SH

## (undated) DEVICE — GLOVE SURG 6.5 PROTEXIS LF CRM PF BEAD CUFF STRL PLISPRN

## (undated) DEVICE — STOPCOCK IV 3 WAY SWVL MALE LL LOCK SM BORE MEDEX HI-FLO

## (undated) DEVICE — DRAPE L4 APRTR BRR PRTC 42X29IN 4.5X3.5IN SURG ACTI-GARD

## (undated) DEVICE — TRANSDUCER PRSS TRUWAVE 1 BOND 1 WAY SHTOF STPCK STRL LF

## (undated) DEVICE — SET ANGIO 72IN 1 LG BORE TUBE SPK HEAD VENT STRL NAMIC .214

## (undated) DEVICE — LINE ANGIO 122CM 3.25MM 1.5MM FEMALE TO MALE LL PRSS MNTR

## (undated) DEVICE — CATHETER 6FR JR4 CRV 100CM LG INNER LUM RADOPQ SLCT INFNT

## (undated) DEVICE — KIT INTRO 6FR 21GA 10CM 45CM .021IN 35MM FLEX STRGT SHTH DIL

## (undated) DEVICE — STOPCOCK IV DARK BLUE .082IN MARQUIS 1050 PSI PLYCRB 3W HPRS

## (undated) DEVICE — HEMOSTAT CMPR VASC REG 24CM

## (undated) NOTE — MR AVS SNAPSHOT
[facility] - [place] [address] [address]  [id number] [phone]               Thank you for choosing us for your health care visit with Tiburcio Glover MD.  We are glad to serve you and happy to provide you with this summary of Take  by mouth. BIOTIN (unknown strength)           Vitamin B-12 1000 MCG Tabs   Take 1,000 mcg by mouth daily. Commonly known as:  VITAMIN B12           * Vitamin D3 1000 units Caps   Take 1 tablet by mouth daily.            * Vitamin D3 1000 units Tabs Eat plenty of protein, keep the fat content low Sugars:  sodas and sports drinks, candies and desserts   Eat plenty of low-fat dairy products High fat meats and dairy   Choose whole grain products Foods high in sodium   Water is best for hydration Fast aliyah

## (undated) NOTE — LETTER
12/30/20        Michelle Black  [address]  [id number]      Dear Jack Fisher records indicate that you have outstanding lab work and or testing that was ordered for you and has not yet been completed:   Zach Patel [employer], EIA  Ple

## (undated) NOTE — LETTER
05/12/25  Hello,      This is the [facility], office of Dr. Lynn Medina     Thank you for putting your trust in [facility].  Our goal is to deliver the highest quality healthcare and an exceptional patient experience. Upon reviewing of your medical record shows you are due for the following:       ANNUAL PHYSICAL  BLOOD PRESSURE FOLLOW UP               Please call [phone] to schedule your appointment or schedule online via [employer].     If you changed to a new provider at another facility, please notify the clinic to update your records.     If you had any recent testing at another facility, please have your results faxed to our office at [phone].      Thank you and have a great day!